# Patient Record
Sex: FEMALE | Race: WHITE | NOT HISPANIC OR LATINO | Employment: OTHER | URBAN - METROPOLITAN AREA
[De-identification: names, ages, dates, MRNs, and addresses within clinical notes are randomized per-mention and may not be internally consistent; named-entity substitution may affect disease eponyms.]

---

## 2019-09-03 ENCOUNTER — HOSPITAL ENCOUNTER (INPATIENT)
Facility: HOSPITAL | Age: 64
LOS: 3 days | Discharge: HOME | End: 2019-09-06
Attending: EMERGENCY MEDICINE | Admitting: INTERNAL MEDICINE

## 2019-09-03 ENCOUNTER — APPOINTMENT (OUTPATIENT)
Dept: GENERAL RADIOLOGY | Facility: HOSPITAL | Age: 64
End: 2019-09-03

## 2019-09-03 DIAGNOSIS — J44.1 COPD EXACERBATION (HCC): Primary | ICD-10-CM

## 2019-09-03 DIAGNOSIS — J96.21 ACUTE ON CHRONIC RESPIRATORY FAILURE WITH HYPOXIA (HCC): ICD-10-CM

## 2019-09-03 PROBLEM — Z72.0 TOBACCO ABUSE: Status: ACTIVE | Noted: 2019-09-03

## 2019-09-03 PROBLEM — D72.829 LEUKOCYTOSIS: Status: ACTIVE | Noted: 2019-09-03

## 2019-09-03 PROBLEM — J96.22 ACUTE ON CHRONIC RESPIRATORY FAILURE WITH HYPOXIA AND HYPERCAPNIA (HCC): Status: ACTIVE | Noted: 2019-09-03

## 2019-09-03 PROBLEM — I10 ESSENTIAL HYPERTENSION: Status: ACTIVE | Noted: 2019-09-03

## 2019-09-03 PROBLEM — R73.9 HYPERGLYCEMIA: Status: ACTIVE | Noted: 2019-09-03

## 2019-09-03 PROBLEM — R19.7 DIARRHEA: Status: ACTIVE | Noted: 2019-09-03

## 2019-09-03 PROBLEM — M19.90 ARTHRITIS: Status: ACTIVE | Noted: 2019-09-03

## 2019-09-03 LAB
ALBUMIN SERPL-MCNC: 3.6 G/DL (ref 3.5–5.2)
ALBUMIN/GLOB SERPL: 1.2 G/DL
ALP SERPL-CCNC: 103 U/L (ref 39–117)
ALT SERPL W P-5'-P-CCNC: 11 U/L (ref 1–33)
ANION GAP SERPL CALCULATED.3IONS-SCNC: 13 MMOL/L (ref 5–15)
ARTERIAL PATENCY WRIST A: ABNORMAL
AST SERPL-CCNC: 11 U/L (ref 1–32)
ATMOSPHERIC PRESS: ABNORMAL MM[HG]
BASE EXCESS BLDA CALC-SCNC: 12.3 MMOL/L (ref 0–2)
BASOPHILS # BLD AUTO: 0.03 10*3/MM3 (ref 0–0.2)
BASOPHILS NFR BLD AUTO: 0.2 % (ref 0–1.5)
BILIRUB SERPL-MCNC: 0.4 MG/DL (ref 0.2–1.2)
BODY TEMPERATURE: 98.6 C
BUN BLD-MCNC: 13 MG/DL (ref 8–23)
BUN/CREAT SERPL: 13 (ref 7–25)
CALCIUM SPEC-SCNC: 8.8 MG/DL (ref 8.6–10.5)
CHLORIDE SERPL-SCNC: 93 MMOL/L (ref 98–107)
CO2 BLDA-SCNC: 38.9 MMOL/L (ref 23–27)
CO2 SERPL-SCNC: 34 MMOL/L (ref 22–29)
COHGB MFR BLD: 5.1 % (ref 0–2)
CREAT BLD-MCNC: 1 MG/DL (ref 0.57–1)
DEPRECATED RDW RBC AUTO: 48.4 FL (ref 37–54)
EOSINOPHIL # BLD AUTO: 0.06 10*3/MM3 (ref 0–0.4)
EOSINOPHIL NFR BLD AUTO: 0.4 % (ref 0.3–6.2)
ERYTHROCYTE [DISTWIDTH] IN BLOOD BY AUTOMATED COUNT: 14.1 % (ref 12.3–15.4)
GFR SERPL CREATININE-BSD FRML MDRD: 56 ML/MIN/1.73
GLOBULIN UR ELPH-MCNC: 3.1 GM/DL
GLUCOSE BLD-MCNC: 134 MG/DL (ref 65–99)
GLUCOSE BLDC GLUCOMTR-MCNC: 160 MG/DL (ref 70–130)
HCO3 BLDA-SCNC: 37.3 MMOL/L (ref 20–26)
HCT VFR BLD AUTO: 36.5 % (ref 34–46.6)
HCT VFR BLD CALC: 33.6 %
HGB BLD-MCNC: 11.7 G/DL (ref 12–15.9)
HGB BLDA-MCNC: 11 G/DL (ref 14–18)
HOLD SPECIMEN: NORMAL
HOLD SPECIMEN: NORMAL
HOROWITZ INDEX BLD+IHG-RTO: 28 %
IMM GRANULOCYTES # BLD AUTO: 0.08 10*3/MM3 (ref 0–0.05)
IMM GRANULOCYTES NFR BLD AUTO: 0.5 % (ref 0–0.5)
LYMPHOCYTES # BLD AUTO: 1.03 10*3/MM3 (ref 0.7–3.1)
LYMPHOCYTES NFR BLD AUTO: 6.9 % (ref 19.6–45.3)
MCH RBC QN AUTO: 30.1 PG (ref 26.6–33)
MCHC RBC AUTO-ENTMCNC: 32.1 G/DL (ref 31.5–35.7)
MCV RBC AUTO: 93.8 FL (ref 79–97)
METHGB BLD QL: 1.1 % (ref 0–1.5)
MODALITY: ABNORMAL
MONOCYTES # BLD AUTO: 0.55 10*3/MM3 (ref 0.1–0.9)
MONOCYTES NFR BLD AUTO: 3.7 % (ref 5–12)
NEUTROPHILS # BLD AUTO: 13.16 10*3/MM3 (ref 1.7–7)
NEUTROPHILS NFR BLD AUTO: 88.3 % (ref 42.7–76)
NOTE: ABNORMAL
NRBC BLD AUTO-RTO: 0 /100 WBC (ref 0–0.2)
NT-PROBNP SERPL-MCNC: 248.2 PG/ML (ref 5–900)
OXYHGB MFR BLDV: 88.5 % (ref 94–99)
PCO2 BLDA: 49.4 MM HG (ref 35–45)
PCO2 TEMP ADJ BLD: 49.4 MM HG (ref 35–45)
PH BLDA: 7.49 PH UNITS (ref 7.35–7.45)
PH, TEMP CORRECTED: 7.49 PH UNITS
PLATELET # BLD AUTO: 279 10*3/MM3 (ref 140–450)
PMV BLD AUTO: 10 FL (ref 6–12)
PO2 BLDA: 68 MM HG (ref 83–108)
PO2 TEMP ADJ BLD: 68 MM HG (ref 83–108)
POTASSIUM BLD-SCNC: 3.5 MMOL/L (ref 3.5–5.2)
PROT SERPL-MCNC: 6.7 G/DL (ref 6–8.5)
RBC # BLD AUTO: 3.89 10*6/MM3 (ref 3.77–5.28)
SODIUM BLD-SCNC: 140 MMOL/L (ref 136–145)
TROPONIN T SERPL-MCNC: <0.01 NG/ML (ref 0–0.03)
VENTILATOR MODE: ABNORMAL
WBC NRBC COR # BLD: 14.91 10*3/MM3 (ref 3.4–10.8)
WHOLE BLOOD HOLD SPECIMEN: NORMAL
WHOLE BLOOD HOLD SPECIMEN: NORMAL

## 2019-09-03 PROCEDURE — 71045 X-RAY EXAM CHEST 1 VIEW: CPT

## 2019-09-03 PROCEDURE — 82805 BLOOD GASES W/O2 SATURATION: CPT

## 2019-09-03 PROCEDURE — 82962 GLUCOSE BLOOD TEST: CPT

## 2019-09-03 PROCEDURE — 25010000002 METHYLPREDNISOLONE PER 40 MG: Performed by: EMERGENCY MEDICINE

## 2019-09-03 PROCEDURE — 94799 UNLISTED PULMONARY SVC/PX: CPT

## 2019-09-03 PROCEDURE — 83880 ASSAY OF NATRIURETIC PEPTIDE: CPT | Performed by: EMERGENCY MEDICINE

## 2019-09-03 PROCEDURE — 85025 COMPLETE CBC W/AUTO DIFF WBC: CPT | Performed by: EMERGENCY MEDICINE

## 2019-09-03 PROCEDURE — 63710000001 INSULIN LISPRO (HUMAN) PER 5 UNITS: Performed by: NURSE PRACTITIONER

## 2019-09-03 PROCEDURE — 93005 ELECTROCARDIOGRAM TRACING: CPT | Performed by: EMERGENCY MEDICINE

## 2019-09-03 PROCEDURE — 99223 1ST HOSP IP/OBS HIGH 75: CPT | Performed by: FAMILY MEDICINE

## 2019-09-03 PROCEDURE — 99285 EMERGENCY DEPT VISIT HI MDM: CPT

## 2019-09-03 PROCEDURE — 80053 COMPREHEN METABOLIC PANEL: CPT | Performed by: EMERGENCY MEDICINE

## 2019-09-03 PROCEDURE — 84484 ASSAY OF TROPONIN QUANT: CPT | Performed by: EMERGENCY MEDICINE

## 2019-09-03 PROCEDURE — 25010000002 HEPARIN (PORCINE) PER 1000 UNITS: Performed by: NURSE PRACTITIONER

## 2019-09-03 PROCEDURE — 36600 WITHDRAWAL OF ARTERIAL BLOOD: CPT

## 2019-09-03 RX ORDER — NICOTINE 21 MG/24HR
1 PATCH, TRANSDERMAL 24 HOURS TRANSDERMAL EVERY 24 HOURS
Status: DISCONTINUED | OUTPATIENT
Start: 2019-09-03 | End: 2019-09-06 | Stop reason: HOSPADM

## 2019-09-03 RX ORDER — HYDROCODONE BITARTRATE AND ACETAMINOPHEN 10; 325 MG/1; MG/1
1 TABLET ORAL EVERY 6 HOURS PRN
Status: DISCONTINUED | OUTPATIENT
Start: 2019-09-03 | End: 2019-09-06 | Stop reason: HOSPADM

## 2019-09-03 RX ORDER — DEXTROSE MONOHYDRATE 25 G/50ML
25 INJECTION, SOLUTION INTRAVENOUS
Status: DISCONTINUED | OUTPATIENT
Start: 2019-09-03 | End: 2019-09-06 | Stop reason: HOSPADM

## 2019-09-03 RX ORDER — NYSTATIN 100000 U/G
CREAM TOPICAL AS NEEDED
COMMUNITY
End: 2022-08-08

## 2019-09-03 RX ORDER — GABAPENTIN 400 MG/1
800 CAPSULE ORAL EVERY 8 HOURS SCHEDULED
Status: DISCONTINUED | OUTPATIENT
Start: 2019-09-03 | End: 2019-09-06 | Stop reason: HOSPADM

## 2019-09-03 RX ORDER — POTASSIUM CHLORIDE 750 MG/1
20 CAPSULE, EXTENDED RELEASE ORAL 2 TIMES DAILY WITH MEALS
Status: DISCONTINUED | OUTPATIENT
Start: 2019-09-03 | End: 2019-09-06 | Stop reason: HOSPADM

## 2019-09-03 RX ORDER — IPRATROPIUM BROMIDE AND ALBUTEROL SULFATE 2.5; .5 MG/3ML; MG/3ML
3 SOLUTION RESPIRATORY (INHALATION) ONCE
Status: COMPLETED | OUTPATIENT
Start: 2019-09-03 | End: 2019-09-03

## 2019-09-03 RX ORDER — SACCHAROMYCES BOULARDII 250 MG
250 CAPSULE ORAL 2 TIMES DAILY
COMMUNITY
End: 2019-09-03

## 2019-09-03 RX ORDER — DOCUSATE SODIUM 250 MG
250 CAPSULE ORAL 2 TIMES DAILY
COMMUNITY

## 2019-09-03 RX ORDER — SODIUM CHLORIDE 0.9 % (FLUSH) 0.9 %
10 SYRINGE (ML) INJECTION AS NEEDED
Status: DISCONTINUED | OUTPATIENT
Start: 2019-09-03 | End: 2019-09-06 | Stop reason: HOSPADM

## 2019-09-03 RX ORDER — HYDROXYZINE HYDROCHLORIDE 25 MG/1
25 TABLET, FILM COATED ORAL 3 TIMES DAILY PRN
Status: DISCONTINUED | OUTPATIENT
Start: 2019-09-03 | End: 2019-09-06 | Stop reason: HOSPADM

## 2019-09-03 RX ORDER — THIAMINE HCL 100 MG
TABLET ORAL DAILY
COMMUNITY

## 2019-09-03 RX ORDER — SACCHAROMYCES BOULARDII 250 MG
250 CAPSULE ORAL 2 TIMES DAILY
Status: DISCONTINUED | OUTPATIENT
Start: 2019-09-03 | End: 2019-09-06 | Stop reason: HOSPADM

## 2019-09-03 RX ORDER — ASCORBIC ACID 500 MG
1000 TABLET ORAL DAILY
COMMUNITY
End: 2022-08-08

## 2019-09-03 RX ORDER — PANTOPRAZOLE SODIUM 40 MG/1
40 TABLET, DELAYED RELEASE ORAL EVERY MORNING
Status: DISCONTINUED | OUTPATIENT
Start: 2019-09-04 | End: 2019-09-05

## 2019-09-03 RX ORDER — BUMETANIDE 1 MG/1
1 TABLET ORAL DAILY
COMMUNITY

## 2019-09-03 RX ORDER — ATORVASTATIN CALCIUM 20 MG/1
20 TABLET, FILM COATED ORAL DAILY
COMMUNITY

## 2019-09-03 RX ORDER — DEXTROMETHORPHAN HYDROBROMIDE AND PROMETHAZINE HYDROCHLORIDE 15; 6.25 MG/5ML; MG/5ML
5 SYRUP ORAL AS NEEDED
COMMUNITY
End: 2022-08-08

## 2019-09-03 RX ORDER — HEPARIN SODIUM 5000 [USP'U]/ML
5000 INJECTION, SOLUTION INTRAVENOUS; SUBCUTANEOUS EVERY 8 HOURS SCHEDULED
Status: DISCONTINUED | OUTPATIENT
Start: 2019-09-03 | End: 2019-09-06 | Stop reason: HOSPADM

## 2019-09-03 RX ORDER — BUMETANIDE 1 MG/1
1 TABLET ORAL DAILY
Status: DISCONTINUED | OUTPATIENT
Start: 2019-09-04 | End: 2019-09-06 | Stop reason: HOSPADM

## 2019-09-03 RX ORDER — ONDANSETRON 2 MG/ML
4 INJECTION INTRAMUSCULAR; INTRAVENOUS EVERY 6 HOURS PRN
Status: DISCONTINUED | OUTPATIENT
Start: 2019-09-03 | End: 2019-09-06 | Stop reason: HOSPADM

## 2019-09-03 RX ORDER — FLUOXETINE HYDROCHLORIDE 20 MG/1
20 CAPSULE ORAL DAILY
Status: DISCONTINUED | OUTPATIENT
Start: 2019-09-04 | End: 2019-09-06 | Stop reason: HOSPADM

## 2019-09-03 RX ORDER — ATORVASTATIN CALCIUM 20 MG/1
20 TABLET, FILM COATED ORAL DAILY
Status: DISCONTINUED | OUTPATIENT
Start: 2019-09-04 | End: 2019-09-06 | Stop reason: HOSPADM

## 2019-09-03 RX ORDER — BUDESONIDE 0.5 MG/2ML
0.5 INHALANT ORAL
Status: DISCONTINUED | OUTPATIENT
Start: 2019-09-03 | End: 2019-09-06 | Stop reason: HOSPADM

## 2019-09-03 RX ORDER — NYSTATIN 100000 [USP'U]/G
POWDER TOPICAL AS NEEDED
COMMUNITY
End: 2022-08-08

## 2019-09-03 RX ORDER — ACETAMINOPHEN 325 MG/1
650 TABLET ORAL EVERY 4 HOURS PRN
Status: DISCONTINUED | OUTPATIENT
Start: 2019-09-03 | End: 2019-09-06 | Stop reason: HOSPADM

## 2019-09-03 RX ORDER — QUETIAPINE FUMARATE 50 MG/1
50 TABLET, FILM COATED ORAL NIGHTLY
COMMUNITY
End: 2022-08-08

## 2019-09-03 RX ORDER — QUETIAPINE FUMARATE 25 MG/1
50 TABLET, FILM COATED ORAL NIGHTLY
Status: DISCONTINUED | OUTPATIENT
Start: 2019-09-03 | End: 2019-09-06 | Stop reason: HOSPADM

## 2019-09-03 RX ORDER — CHOLECALCIFEROL (VITAMIN D3) 125 MCG
2000 CAPSULE ORAL DAILY
COMMUNITY
End: 2022-08-08

## 2019-09-03 RX ORDER — CALCIUM CARBONATE 200(500)MG
2 TABLET,CHEWABLE ORAL 3 TIMES DAILY PRN
Status: DISCONTINUED | OUTPATIENT
Start: 2019-09-03 | End: 2019-09-06 | Stop reason: HOSPADM

## 2019-09-03 RX ORDER — IPRATROPIUM BROMIDE AND ALBUTEROL SULFATE 2.5; .5 MG/3ML; MG/3ML
3 SOLUTION RESPIRATORY (INHALATION)
Status: DISCONTINUED | OUTPATIENT
Start: 2019-09-03 | End: 2019-09-06 | Stop reason: HOSPADM

## 2019-09-03 RX ORDER — CETIRIZINE HYDROCHLORIDE 10 MG/1
10 TABLET ORAL DAILY
Status: DISCONTINUED | OUTPATIENT
Start: 2019-09-04 | End: 2019-09-06 | Stop reason: HOSPADM

## 2019-09-03 RX ORDER — METHYLPREDNISOLONE SODIUM SUCCINATE 40 MG/ML
30 INJECTION, POWDER, LYOPHILIZED, FOR SOLUTION INTRAMUSCULAR; INTRAVENOUS EVERY 12 HOURS
Status: DISCONTINUED | OUTPATIENT
Start: 2019-09-04 | End: 2019-09-04

## 2019-09-03 RX ORDER — NICOTINE 21 MG/24HR
1 PATCH, TRANSDERMAL 24 HOURS TRANSDERMAL EVERY 24 HOURS
COMMUNITY
End: 2019-09-06 | Stop reason: HOSPADM

## 2019-09-03 RX ORDER — ALBUTEROL SULFATE 2.5 MG/3ML
2.5 SOLUTION RESPIRATORY (INHALATION) EVERY 4 HOURS PRN
Status: DISCONTINUED | OUTPATIENT
Start: 2019-09-03 | End: 2019-09-06 | Stop reason: HOSPADM

## 2019-09-03 RX ORDER — CHOLECALCIFEROL (VITAMIN D3) 125 MCG
5 CAPSULE ORAL NIGHTLY PRN
Status: DISCONTINUED | OUTPATIENT
Start: 2019-09-03 | End: 2019-09-06 | Stop reason: HOSPADM

## 2019-09-03 RX ORDER — MISOPROSTOL 200 UG/1
200 TABLET ORAL 2 TIMES DAILY
COMMUNITY

## 2019-09-03 RX ORDER — BUSPIRONE HYDROCHLORIDE 5 MG/1
5 TABLET ORAL EVERY 8 HOURS SCHEDULED
Status: DISCONTINUED | OUTPATIENT
Start: 2019-09-03 | End: 2019-09-06 | Stop reason: HOSPADM

## 2019-09-03 RX ORDER — POTASSIUM CHLORIDE 20 MEQ/1
20 TABLET, EXTENDED RELEASE ORAL 2 TIMES DAILY
COMMUNITY

## 2019-09-03 RX ORDER — ACETAMINOPHEN 650 MG/1
650 SUPPOSITORY RECTAL EVERY 4 HOURS PRN
Status: DISCONTINUED | OUTPATIENT
Start: 2019-09-03 | End: 2019-09-06 | Stop reason: HOSPADM

## 2019-09-03 RX ORDER — BUDESONIDE AND FORMOTEROL FUMARATE DIHYDRATE 160; 4.5 UG/1; UG/1
2 AEROSOL RESPIRATORY (INHALATION)
Status: ON HOLD | COMMUNITY
End: 2020-06-10

## 2019-09-03 RX ORDER — SODIUM CHLORIDE 0.9 % (FLUSH) 0.9 %
10 SYRINGE (ML) INJECTION EVERY 12 HOURS SCHEDULED
Status: DISCONTINUED | OUTPATIENT
Start: 2019-09-03 | End: 2019-09-06 | Stop reason: HOSPADM

## 2019-09-03 RX ORDER — GABAPENTIN 800 MG/1
800 TABLET ORAL 3 TIMES DAILY
COMMUNITY
End: 2022-01-24

## 2019-09-03 RX ORDER — BUSPIRONE HYDROCHLORIDE 5 MG/1
10 TABLET ORAL 2 TIMES DAILY
COMMUNITY
End: 2022-08-08

## 2019-09-03 RX ORDER — ACETAMINOPHEN 160 MG/5ML
650 SOLUTION ORAL EVERY 4 HOURS PRN
Status: DISCONTINUED | OUTPATIENT
Start: 2019-09-03 | End: 2019-09-06 | Stop reason: HOSPADM

## 2019-09-03 RX ORDER — NICOTINE POLACRILEX 4 MG
15 LOZENGE BUCCAL
Status: DISCONTINUED | OUTPATIENT
Start: 2019-09-03 | End: 2019-09-06 | Stop reason: HOSPADM

## 2019-09-03 RX ORDER — METHYLPREDNISOLONE SODIUM SUCCINATE 40 MG/ML
80 INJECTION, POWDER, LYOPHILIZED, FOR SOLUTION INTRAMUSCULAR; INTRAVENOUS ONCE
Status: COMPLETED | OUTPATIENT
Start: 2019-09-03 | End: 2019-09-03

## 2019-09-03 RX ORDER — PHENOL 1.4 %
40 AEROSOL, SPRAY (ML) MUCOUS MEMBRANE NIGHTLY
COMMUNITY
End: 2022-08-08

## 2019-09-03 RX ORDER — BENZONATATE 100 MG/1
200 CAPSULE ORAL 3 TIMES DAILY PRN
Status: DISCONTINUED | OUTPATIENT
Start: 2019-09-03 | End: 2019-09-06 | Stop reason: HOSPADM

## 2019-09-03 RX ORDER — TIZANIDINE 4 MG/1
4 TABLET ORAL 3 TIMES DAILY PRN
Status: DISCONTINUED | OUTPATIENT
Start: 2019-09-03 | End: 2019-09-06 | Stop reason: HOSPADM

## 2019-09-03 RX ORDER — TRAZODONE HYDROCHLORIDE 50 MG/1
50 TABLET ORAL NIGHTLY PRN
Status: DISCONTINUED | OUTPATIENT
Start: 2019-09-03 | End: 2019-09-06 | Stop reason: HOSPADM

## 2019-09-03 RX ORDER — FLUTICASONE PROPIONATE 50 MCG
2 SPRAY, SUSPENSION (ML) NASAL DAILY
Status: DISCONTINUED | OUTPATIENT
Start: 2019-09-04 | End: 2019-09-06 | Stop reason: HOSPADM

## 2019-09-03 RX ORDER — LOSARTAN POTASSIUM 50 MG/1
100 TABLET ORAL DAILY
Status: DISCONTINUED | OUTPATIENT
Start: 2019-09-04 | End: 2019-09-06 | Stop reason: HOSPADM

## 2019-09-03 RX ADMIN — BUSPIRONE HYDROCHLORIDE 5 MG: 5 TABLET ORAL at 22:47

## 2019-09-03 RX ADMIN — HYDROCODONE BITARTRATE AND ACETAMINOPHEN 1 TABLET: 10; 325 TABLET ORAL at 23:04

## 2019-09-03 RX ADMIN — HEPARIN SODIUM 5000 UNITS: 5000 INJECTION INTRAVENOUS; SUBCUTANEOUS at 22:47

## 2019-09-03 RX ADMIN — INSULIN LISPRO 2 UNITS: 100 INJECTION, SOLUTION INTRAVENOUS; SUBCUTANEOUS at 20:20

## 2019-09-03 RX ADMIN — QUETIAPINE FUMARATE 50 MG: 25 TABLET ORAL at 20:16

## 2019-09-03 RX ADMIN — BUDESONIDE 0.5 MG: 0.5 INHALANT RESPIRATORY (INHALATION) at 19:25

## 2019-09-03 RX ADMIN — GABAPENTIN 800 MG: 400 CAPSULE ORAL at 22:47

## 2019-09-03 RX ADMIN — IPRATROPIUM BROMIDE AND ALBUTEROL SULFATE 3 ML: 2.5; .5 SOLUTION RESPIRATORY (INHALATION) at 16:23

## 2019-09-03 RX ADMIN — METHYLPREDNISOLONE SODIUM SUCCINATE 80 MG: 40 INJECTION, POWDER, FOR SOLUTION INTRAMUSCULAR; INTRAVENOUS at 15:23

## 2019-09-03 RX ADMIN — POTASSIUM CHLORIDE 20 MEQ: 750 CAPSULE, EXTENDED RELEASE ORAL at 18:52

## 2019-09-03 RX ADMIN — IPRATROPIUM BROMIDE AND ALBUTEROL SULFATE 3 ML: 2.5; .5 SOLUTION RESPIRATORY (INHALATION) at 19:25

## 2019-09-03 RX ADMIN — Medication 250 MG: at 20:16

## 2019-09-03 RX ADMIN — NICOTINE 1 PATCH: 21 PATCH, EXTENDED RELEASE TRANSDERMAL at 18:52

## 2019-09-03 NOTE — H&P
[facility] Medicine Services  HISTORY AND PHYSICAL    Patient Name: Quyen Galvan  : 1955  MRN: [id number]  Primary Care Physician: Akshat Dawson MD  Date of admission: 9/3/2019      Subjective   Subjective     Chief Complaint:  SOA    HPI:  Quyen Galvan is a 63 y.o. female with past medical history significant for hypertension, hyperlipidemia, chronic pain/fibromyalgia/arthritis, COPD and ongoing tobacco use presents for shortness of air and productive cough.  Patient states she has been feeling unwell for approximately 1 month and was admitted to [facility] around 2019 for 2 days.  She was discharged with prednisone and Z-Bernardo.  She was offered oxygen however refused and has not stopped smoking while at home post discharge.  She states she completed her last dose of prednisone earlier today and after completing Z-Bernardo she presented to primary care provider's office for ongoing cough, chest congestion, shortness of breath who started her on doxycycline.    Patient presents today as symptoms have not improved despite inpatient/outpatient treatment.  She is run out of medication for nebulizer which helps some as well as cough medicine which helps minimally however symptoms have not improved in the past month.  Patient states she has productive cough, chest congestion, wheezing, dyspnea on exertion.  States she has posttussive dizziness/lightheadedness.  Denies nausea or vomiting, but endorses decreased appetite and small amount of weight loss over the last month.  She reports diarrhea that has been ongoing x3 days.  Reports diarrhea as watery and continues despite not eating.  He denies fever, chills, upper airway symptoms.  Patient to be admitted for COPD exacerbation and ongoing treatment.    Review of Systems   Constitutional: Positive for activity change, appetite change and fatigue. Negative for fever.   HENT: Negative.    Eyes: Negative.     Respiratory: Positive for cough, chest tightness, shortness of breath and wheezing.    Cardiovascular: Negative.    Gastrointestinal: Positive for diarrhea.   Endocrine: Negative.    Genitourinary: Negative.    Musculoskeletal: Positive for arthralgias.   Skin: Negative.    Neurological: Positive for weakness and light-headedness.   Psychiatric/Behavioral: Negative.           All other systems reviewed and are negative.     Personal History     Past Medical History:   Diagnosis Date   • Anemia    • Arthritis    • COPD (chronic obstructive pulmonary disease) (CMS/HCC)    • Fibromyalgia    • Hyperlipidemia    • Hypertension        Past Surgical History:   Procedure Laterality Date   • CHOLECYSTECTOMY     • FRACTURE SURGERY      left wrist   • OVARIAN CYST REMOVAL     • SPINE SURGERY         Family History: family history includes Hypertension in her father. Otherwise pertinent FHx was reviewed and unremarkable.     Social History:  reports that she has been smoking cigarettes.  She has been smoking about 0.50 packs per day. She has never used smokeless tobacco. She reports that she does not drink alcohol or use drugs.  Social History     Social History Narrative    Lives with  in [place].  Independent with all ADLs       Medications:    Available home medication information reviewed.  Medications Prior to Admission   Medication Sig Dispense Refill Last Dose   • albuterol (PROVENTIL HFA;VENTOLIN HFA) 108 (90 BASE) MCG/ACT inhaler Inhale 2 puffs every 4 (four) hours as needed for wheezing.   9/2/2019 at Unknown time   • atorvastatin (LIPITOR) 20 MG tablet Take 20 mg by mouth Daily.   9/3/2019 at Unknown time   • budesonide-formoterol (SYMBICORT) 160-4.5 MCG/ACT inhaler Inhale 2 puffs 2 (Two) Times a Day.   9/3/2019 at Unknown time   • bumetanide (BUMEX) 1 MG tablet Take 1 mg by mouth 2 (Two) Times a Day.   9/3/2019 at Unknown time   • busPIRone (BUSPAR) 5 MG tablet Take 10 mg by mouth 2 (Two) Times a Day.    9/3/2019 at Unknown time   • Calcium Carbonate-Vit D-Min (CALCIUM 1200 PO) Take 1,200 mg by mouth 2 (Two) Times a Day.   9/3/2019 at Unknown time   • Cholecalciferol (VITAMIN D3) 2000 units tablet Take 2,000 Units by mouth Daily.   9/3/2019 at Unknown time   • diclofenac (VOLTAREN) 1 % gel gel Apply 4 g topically to the appropriate area as directed 4 (Four) Times a Day As Needed (feet and knees).   Past Week at Unknown time   • diclofenac (VOLTAREN) 50 MG EC tablet Take 50 mg by mouth 2 (Two) Times a Day.   9/3/2019 at Unknown time   • docusate sodium (COLACE) 250 MG capsule Take 250 mg by mouth 2 (Two) Times a Day.   9/3/2019 at Unknown time   • fexofenadine (ALLEGRA) 180 MG tablet Take 180 mg by mouth daily.   9/3/2019 at Unknown time   • FLUoxetine (PROzac) 20 MG capsule Take 20 mg by mouth daily.   9/3/2019 at Unknown time   • fluticasone (FLONASE) 50 MCG/ACT nasal spray 2 sprays into the nostril(s) as directed by provider Daily. Administer 2 sprays in each nostril for each dose.    9/3/2019 at Unknown time   • gabapentin (NEURONTIN) 800 MG tablet Take 800 mg by mouth 3 (Three) Times a Day.   9/3/2019 at 0930   • HYDROcodone-acetaminophen (NORCO)  MG per tablet Take 1 tablet by mouth Every 6 (Six) Hours.   9/3/2019 at 0930   • hydrOXYzine (ATARAX) 25 MG tablet Take 25 mg by mouth 4 (Four) Times a Day.   9/3/2019 at Unknown time   • losartan (COZAAR) 100 MG tablet Take 100 mg by mouth Daily.   9/3/2019 at Unknown time   • Melatonin 10 MG tablet Take 40 mg by mouth Every Night. 4 tabs (40mg total) nightly   9/2/2019 at Unknown time   • misoprostol (CYTOTEC) 200 MCG tablet Take 200 mcg by mouth 2 (Two) Times a Day.   9/3/2019 at Unknown time   • nicotine (NICODERM CQ) 21 MG/24HR patch Place 1 patch on the skin as directed by provider Daily.   Past Month at Unknown time   • nitroglycerin (NITROSTAT) 0.4 MG SL tablet Place 0.4 mg under the tongue every 5 (five) minutes as needed for chest pain. Take no more  than 3 doses in 15 minutes.      • nystatin (MYCOSTATIN) 212072 UNIT/GM cream Apply  topically to the appropriate area as directed As Needed.   9/2/2019 at Unknown time   • nystatin (MYCOSTATIN) 210226 UNIT/GM powder Apply  topically to the appropriate area as directed As Needed.      • omeprazole (priLOSEC) 40 MG capsule Take 40 mg by mouth 2 (Two) Times a Day.   9/3/2019 at Unknown time   • potassium chloride (K-DUR,KLOR-CON) 20 MEQ CR tablet Take 20 mEq by mouth Daily.   9/3/2019 at Unknown time   • Probiotic capsule Take 1 capsule by mouth Daily.   9/3/2019 at Unknown time   • promethazine-dextromethorphan (PROMETHAZINE-DM) 6.25-15 MG/5ML syrup Take 5 mL by mouth As Needed.   9/2/2019 at Unknown time   • QUEtiapine (SEROquel) 50 MG tablet Take 50 mg by mouth Every Night.   9/2/2019 at Unknown time   • tiZANidine (ZANAFLEX) 4 MG tablet Take 4 mg by mouth 3 (three) times a day.   9/3/2019 at Unknown time   • triamcinolone (KENALOG) 0.1 % ointment Apply  topically to the appropriate area as directed As Needed.      • vitamin B-12 (CYANOCOBALAMIN) 2500 MCG sublingual tablet tablet Place  under the tongue Daily.   9/3/2019 at Unknown time   • vitamin C (ASCORBIC ACID) 500 MG tablet Take 1,000 mg by mouth Daily.   Past Week at Unknown time   • VITAMIN E PO Take 1 capsule by mouth Daily.   9/3/2019 at Unknown time       No Known Allergies    Objective   Objective     Vital Signs:   Temp:  [98.4 °F (36.9 °C)] 98.4 °F (36.9 °C)  Heart Rate:  [71-90] 75  Resp:  [20-28] 20  BP: (119-139)/(58-70) 139/66        Physical Exam   Constitutional: Awake, alert  Eyes: PERRLA, sclerae anicteric, no conjunctival injection  HENT: NCAT, mucous membranes moist  Neck: Supple, no thyromegaly, no lymphadenopathy, trachea midline  Respiratory: Few scattered expiratory wheezes, diminished airflow throughout, nonlabored respirations 2 L nasal cannula  Cardiovascular: RRR, no murmurs, rubs, or gallops, palpable pedal pulses  bilaterally  Gastrointestinal: Positive bowel sounds, soft, nontender, nondistended  Musculoskeletal: No bilateral ankle edema, no clubbing or cyanosis to extremities  Psychiatric: Appropriate affect, cooperative  Neurologic: Oriented x 3, strength symmetric in all extremities, Cranial Nerves grossly intact to confrontation, speech clear  Skin: No rashes      Results Reviewed:  I have personally reviewed current lab and radiology data.    Results from last 7 days   Lab Units 09/03/19  1336   WBC 10*3/mm3 14.91*   HEMOGLOBIN g/dL 11.7*   HEMATOCRIT % 36.5   PLATELETS 10*3/mm3 279     Results from last 7 days   Lab Units 09/03/19  1336   SODIUM mmol/L 140   POTASSIUM mmol/L 3.5   CHLORIDE mmol/L 93*   CO2 mmol/L 34.0*   BUN mg/dL 13   CREATININE mg/dL 1.00   GLUCOSE mg/dL 134*   CALCIUM mg/dL 8.8   ALT (SGPT) U/L 11   AST (SGOT) U/L 11   TROPONIN T ng/mL <0.010   PROBNP pg/mL 248.2     Estimated Creatinine Clearance: 62.6 mL/min (by C-G formula based on SCr of 1 mg/dL).  Brief Urine Lab Results     None        Imaging Results (last 24 hours)     Procedure Component Value Units Date/Time    XR Chest 1 View [[id number]] Collected:  09/03/19 1406     Updated:  09/03/19 1445    Narrative:       EXAMINATION: XR CHEST 1 VW-09/03/2019:      INDICATION: SOA, triage protocol.      COMPARISON: Chest radiograph 06/14/2016.     FINDINGS: Single portable chest radiograph submitted for review. The  heart is nonenlarged. The lungs are clear without evidence for  superimposed pneumonia. No pleural effusion or pneumothorax. The  visualized upper abdomen is unrevealing. No acute osseous injury  identified. Calcified granulomas noted within the medial left lower  lung, unchanged from the 2016 exam.           Impression:       No acute cardiopulmonary process.     D:  09/03/2019  E:  09/03/2019                      Assessment/Plan   Assessment / Plan     Active Hospital Problems    Diagnosis POA   • Acute on chronic respiratory failure  with hypoxia and hypercapnia (CMS/HCC) [J96.21, J96.22] Yes   • COPD with acute exacerbation (CMS/HCC) [J44.1] Unknown   • Tobacco abuse [Z72.0] Unknown   • Diarrhea [R19.7] Unknown   • Essential hypertension [I10] Unknown   • Hyperglycemia [R73.9] Unknown   • Arthritis [M19.90] Unknown   • Leukocytosis [D72.829] Unknown     Acute hypoxic respiratory failure and AECOPD despite inpatient and outpatient therapy in a patient who continues to smoke.    --Respiratory support: Oxygen nasal cannula, wean as tolerates.  Continue scheduled and as needed nebulizer treatments, Pulmicort nebs scheduled, Tessalon Perles, twice daily Solu-Medrol.  Patient currently is afebrile and suspect leukocytosis due to ongoing steroids.  Patient has been on 2 weeks of antibiotics so will hold further antibiotics for now.  --Smoking cessation advised/counseled.  Nicotine patch  --Diarrhea x3 days, multiple episodes associated with antibiotic use.  Currently on probiotic and will continue.  Will check for C. difficile colitis.  We will continue PPI for now due to severe GERD, per patient report  --Continue home antihypertensives  --Noted hyperglycemia without history of diabetes likely steroid-induced.  Will check A1c and fingersticks for now.  Will discontinue if blood sugar remains stable  --A.m. Labs  --PT consult for weakness/dyspnea on exertion    DVT prophylaxis:  Heparin sq    CODE STATUS:    Code Status and Medical Interventions:   Ordered at: 09/03/19 2823     Level Of Support Discussed With:    Patient     Code Status:    CPR     Medical Interventions (Level of Support Prior to Arrest):    Full       Admission Status:  I believe this patient meets INPATIENT status due to acute hypoxic respiratory failure and COPD exacerbation.  I feel patient’s risk for adverse outcomes and need for care warrant INPATIENT evaluation and predict the patient’s care encounter to likely last beyond 2 midnights.    Electronically signed by Carlyn FENTON  "Darryn, ARI, 09/03/19, 6:31 PM.        Brief Attending Admission Attestation     I have seen and examined the patient, performing an independent face-to-face diagnostic evaluation with plan of care reviewed and developed with the advanced practice clinician (APC).      Brief Summary Statement:   Quyen Galvan is a 63 y.o. female with PMH significant for HTN, HLD, chronic pain, COPD and ongoing smoking.  The patient has had a month of SOA, cough, and wheezing.  She was admitted to [facility] [place] 8/23 - 8/25 for similar symptoms.  She was discharged on ZPack and steroids PO.  She completed her ZPack on time and completed her steroid course today.  After her ZPack her PCP treated her with doxycycline.  She has not improved despite taking her medications and using her breathing treatments at home.  She has continued to smoke.  She refused home O2 upon discharge from [facility].  She has not measured her temp but felt \"hot\" this morning.  She has had nausea but no vomiting.      Here in the ER, RA sat was 85%.  WBC 14.91.  ABG 7.487/49.4/68.0.  CXR non acute.    Remainder of detailed HPI is as noted above and has been reviewed and/or edited by me for completeness.      Attending Physical Exam:  Constitutional: Awake, alert  Eyes: PERRLA, sclerae anicteric, no conjunctival injection  HENT: NCAT, mucous membranes moist  Neck: Supple, no thyromegaly, no lymphadenopathy, trachea midline  Respiratory:wheezing and rhonchi throughout, mild dyspnea  Cardiovascular: RRR, no murmurs, rubs, or gallops, palpable pedal pulses bilaterally  Gastrointestinal: Positive bowel sounds, soft, nontender, nondistended  Musculoskeletal: No bilateral ankle edema, no clubbing or cyanosis to extremities  Psychiatric: Appropriate affect, cooperative  Neurologic: Oriented x 3, strength symmetric in all extremities, Cranial Nerves grossly intact to confrontation, speech clear  Skin: No rashes        Brief Assessment/Plan :  See above for further detailed " assessment and plan developed with APC which I have reviewed and/or edited for completeness.      Electronically signed by Agustina Campbell MD, 09/03/19, 7:17 PM.

## 2019-09-03 NOTE — PLAN OF CARE
Problem: Patient Care Overview  Goal: Plan of Care Review  Outcome: Ongoing (interventions implemented as appropriate)   09/03/19 1841   Coping/Psychosocial   Plan of Care Reviewed With patient   Plan of Care Review   Progress no change   OTHER   Outcome Summary Pt. admitted from ED, VSS, fall precautions in place, spore precautions in place to rule out c-diff, we will continue to monitor       Problem: Chronic Obstructive Pulmonary Disease (Adult)  Goal: Signs and Symptoms of Listed Potential Problems Will be Absent, Minimized or Managed (Chronic Obstructive Pulmonary Disease)  Outcome: Ongoing (interventions implemented as appropriate)   09/03/19 1841   Goal/Outcome Evaluation   Problems Assessed (Chronic Obstructive Pulmonary Disease (COPD)) all   Problems Present (COPD, Bronch/Emphy) respiratory compromise;situational response

## 2019-09-03 NOTE — ED PROVIDER NOTES
Subjective   Quyen Galvan is a 63 y.o female who presents to the ED with complaints of shortness of breath. She reports she began experiencing shortness of breath with an onset one month ago. She was discharged from [facility] on 08/23/2019 with similar symptoms and sent home on Z-PACK and Prednisone. After her symptoms had not alleviated, she followed-up with Dr. Dawson, family medicine, and was prescribed Doxycycline. She also complains of congestion, abdominal pain, diarrhea, and cough. She denies nausea, vomiting, and fever. She has a past medical history of COPD and hypertension. She has a nebulizer and inhaler at home but states they have both been ineffective. She currently smokes half a pack of cigarettes a day. There are no other acute symptoms at this time.        History provided by:  Patient  Shortness of Breath   Severity:  Severe  Onset quality:  Sudden  Duration:  1 month  Timing:  Constant  Progression:  Unchanged  Chronicity:  New  Associated symptoms: abdominal pain and cough    Associated symptoms: no fever and no vomiting    Abdominal pain:     Location:  Generalized    Quality: cramping      Severity:  Moderate    Onset quality:  Sudden    Timing:  Constant    Progression:  Unchanged    Chronicity:  New  Cough:     Cough characteristics:  Productive    Sputum characteristics:  Yellow    Severity:  Moderate    Onset quality:  Sudden    Timing:  Constant    Progression:  Unchanged    Chronicity:  New  Risk factors: tobacco use        Review of Systems   Constitutional: Negative for fever.   HENT: Positive for congestion.    Respiratory: Positive for cough and shortness of breath.    Gastrointestinal: Positive for abdominal pain and diarrhea. Negative for nausea and vomiting.   All other systems reviewed and are negative.      Past Medical History:   Diagnosis Date   • Anemia    • Arthritis    • COPD (chronic obstructive pulmonary disease) (CMS/[facility])    • Fibromyalgia    • Hyperlipidemia     • Hypertension        No Known Allergies    Past Surgical History:   Procedure Laterality Date   • CHOLECYSTECTOMY     • FRACTURE SURGERY      left wrist   • OVARIAN CYST REMOVAL     • SPINE SURGERY         Family History   Problem Relation Age of Onset   • Hypertension Father        Social History     Socioeconomic History   • Marital status:      Spouse name: Not on file   • Number of children: Not on file   • Years of education: Not on file   • Highest education level: Not on file   Tobacco Use   • Smoking status: Current Every Day Smoker     Packs/day: 0.50     Types: Cigarettes   • Smokeless tobacco: Never Used   Substance and Sexual Activity   • Alcohol use: No     Frequency: Never   • Drug use: No   • Sexual activity: Defer   Social History Narrative    Lives with  in [place].  Independent with all ADLs         Objective   Physical Exam   Constitutional: She is oriented to person, place, and time. She appears well-developed and well-nourished. No distress.   HENT:   Head: Normocephalic and atraumatic.   Nose: Nose normal.   Pharynx benign. Airway patent.   Eyes: Conjunctivae are normal. No scleral icterus.   Neck: Normal range of motion. Neck supple. No JVD present.   Cardiovascular: Normal rate, regular rhythm and normal heart sounds.   No murmur heard.  Pulmonary/Chest: Effort normal. No respiratory distress. She has wheezes. She has rhonchi. She has rales.   Diffuse wheezes and rhonchi. Coarse rales more on the left side than the right.   Abdominal: Soft. Bowel sounds are normal. There is tenderness (mild) in the epigastric area.   Musculoskeletal: Normal range of motion. She exhibits edema.   Trace pretibial edema.   Lymphadenopathy:     She has no cervical adenopathy.   Neurological: She is alert and oriented to person, place, and time.   Skin: Skin is warm and dry.   Psychiatric: Her behavior is normal. She exhibits a depressed mood.   Nursing note and vitals  reviewed.      Procedures         ED Course  ED Course as of Sep 03 2101   Tue Sep 03, 2019   1410 Just completed prednisone course.  [LI]   1703 Dr. Gonsalez is at bedside re-examining the patient, discussing all results, and updating them on the plan. She is minimally improved with the neb treatment and still has diffuse wheezes.  [PK]   1708 Dr. Gonsalez discussed the case with Dr. Campbell, hospitalist, who will admit.  [PK]      ED Course User Index  [LI] Flakito Gonsalez MD  [PK] Gene Benson         Final Diagnosis: as of Sep 03 2101   COPD exacerbation (CMS/HCC)   Acute on chronic respiratory failure with hypoxia (CMS/HCC)     Recent Results (from the past 24 hour(s))   Comprehensive Metabolic Panel    Collection Time: 09/03/19  1:36 PM   Result Value Ref Range    Glucose 134 (H) 65 - 99 mg/dL    BUN 13 8 - 23 mg/dL    Creatinine 1.00 0.57 - 1.00 mg/dL    Sodium 140 136 - 145 mmol/L    Potassium 3.5 3.5 - 5.2 mmol/L    Chloride 93 (L) 98 - 107 mmol/L    CO2 34.0 (H) 22.0 - 29.0 mmol/L    Calcium 8.8 8.6 - 10.5 mg/dL    Total Protein 6.7 6.0 - 8.5 g/dL    Albumin 3.60 3.50 - 5.20 g/dL    ALT (SGPT) 11 1 - 33 U/L    AST (SGOT) 11 1 - 32 U/L    Alkaline Phosphatase 103 39 - 117 U/L    Total Bilirubin 0.4 0.2 - 1.2 mg/dL    eGFR Non African Amer 56 (L) >60 mL/min/1.73    Globulin 3.1 gm/dL    A/G Ratio 1.2 g/dL    BUN/Creatinine Ratio 13.0 7.0 - 25.0    Anion Gap 13.0 5.0 - 15.0 mmol/L   BNP    Collection Time: 09/03/19  1:36 PM   Result Value Ref Range    proBNP 248.2 5.0 - 900.0 pg/mL   Troponin    Collection Time: 09/03/19  1:36 PM   Result Value Ref Range    Troponin T <0.010 0.000 - 0.030 ng/mL   Light Blue Top    Collection Time: 09/03/19  1:36 PM   Result Value Ref Range    Extra Tube hold for add-on    Green Top (Gel)    Collection Time: 09/03/19  1:36 PM   Result Value Ref Range    Extra Tube Hold for add-ons.    Lavender Top    Collection Time: 09/03/19  1:36 PM   Result Value Ref Range    Extra Tube hold  for add-on    Gold Top - SST    Collection Time: 09/03/19  1:36 PM   Result Value Ref Range    Extra Tube Hold for add-ons.    CBC Auto Differential    Collection Time: 09/03/19  1:36 PM   Result Value Ref Range    WBC 14.91 (H) 3.40 - 10.80 10*3/mm3    RBC 3.89 3.77 - 5.28 10*6/mm3    Hemoglobin 11.7 (L) 12.0 - 15.9 g/dL    Hematocrit 36.5 34.0 - 46.6 %    MCV 93.8 79.0 - 97.0 fL    MCH 30.1 26.6 - 33.0 pg    MCHC 32.1 31.5 - 35.7 g/dL    RDW 14.1 12.3 - 15.4 %    RDW-SD 48.4 37.0 - 54.0 fl    MPV 10.0 6.0 - 12.0 fL    Platelets 279 140 - 450 10*3/mm3    Neutrophil % 88.3 (H) 42.7 - 76.0 %    Lymphocyte % 6.9 (L) 19.6 - 45.3 %    Monocyte % 3.7 (L) 5.0 - 12.0 %    Eosinophil % 0.4 0.3 - 6.2 %    Basophil % 0.2 0.0 - 1.5 %    Immature Grans % 0.5 0.0 - 0.5 %    Neutrophils, Absolute 13.16 (H) 1.70 - 7.00 10*3/mm3    Lymphocytes, Absolute 1.03 0.70 - 3.10 10*3/mm3    Monocytes, Absolute 0.55 0.10 - 0.90 10*3/mm3    Eosinophils, Absolute 0.06 0.00 - 0.40 10*3/mm3    Basophils, Absolute 0.03 0.00 - 0.20 10*3/mm3    Immature Grans, Absolute 0.08 (H) 0.00 - 0.05 10*3/mm3    nRBC 0.0 0.0 - 0.2 /100 WBC   Blood Gas, Arterial With Co-Ox    Collection Time: 09/03/19  4:32 PM   Result Value Ref Range    Ascencion's Test N/A     pH, Arterial 7.487 (H) 7.350 - 7.450 pH units    pCO2, Arterial 49.4 (H) 35.0 - 45.0 mm Hg    pO2, Arterial 68.0 (L) 83.0 - 108.0 mm Hg    HCO3, Arterial 37.3 (H) 20.0 - 26.0 mmol/L    Base Excess, Arterial 12.3 (H) 0.0 - 2.0 mmol/L    Hemoglobin, Blood Gas 11.0 (L) 14 - 18 g/dL    Hematocrit, Blood Gas 33.6 %    Oxyhemoglobin 88.5 (L) 94 - 99 %    Methemoglobin 1.10 0.00 - 1.50 %    Carboxyhemoglobin 5.1 (H) 0 - 2 %    CO2 Content 38.9 (H) 23 - 27 mmol/L    Temperature 98.6 C    Barometric Pressure for Blood Gas      Modality Nasal Cannula     FIO2 28 %    Ventilator Mode       Note      pH, Temp Corrected 7.487 pH Units    pCO2, Temperature Corrected 49.4 (H) 35 - 45 mm Hg    pO2, Temperature Corrected  68.0 (L) 83 - 108 mm Hg   POC Glucose Once    Collection Time: 09/03/19  8:19 PM   Result Value Ref Range    Glucose 160 (H) 70 - 130 mg/dL     Note: In addition to lab results from this visit, the labs listed above may include labs taken at another facility or during a different encounter within the last 24 hours. Please correlate lab times with ED admission and discharge times for further clarification of the services performed during this visit.    XR Chest 1 View   Preliminary Result   No acute cardiopulmonary process.       D:  09/03/2019   E:  09/03/2019                    Vitals:    09/03/19 1623 09/03/19 1745 09/03/19 1808 09/03/19 1925   BP:  139/66 146/72    BP Location:   Left arm    Patient Position:       Pulse: 71 75 81 71   Resp:   20 20   Temp:   97.8 °F (36.6 °C)    TempSrc:   Oral    SpO2: 95% 92% 90% 93%   Weight:       Height:         Medications   sodium chloride 0.9 % flush 10 mL (not administered)   atorvastatin (LIPITOR) tablet 20 mg (not administered)   bumetanide (BUMEX) tablet 1 mg (not administered)   busPIRone (BUSPAR) tablet 5 mg (not administered)   diclofenac (VOLTAREN) 1 % gel 4 g (4 g Topical Given 9/3/19 2017)   cetirizine (zyrTEC) tablet 10 mg (not administered)   FLUoxetine (PROzac) capsule 20 mg (not administered)   fluticasone (FLONASE) 50 MCG/ACT nasal spray 2 spray (not administered)   gabapentin (NEURONTIN) capsule 800 mg (not administered)   HYDROcodone-acetaminophen (NORCO)  MG per tablet 1 tablet (not administered)   hydrOXYzine (ATARAX) tablet 25 mg (not administered)   losartan (COZAAR) tablet 100 mg (not administered)   melatonin tablet 5 mg (not administered)   nicotine (NICODERM CQ) 21 MG/24HR patch 1 patch (1 patch Transdermal Medication Applied 9/3/19 1852)   pantoprazole (PROTONIX) EC tablet 40 mg (not administered)   potassium chloride (MICRO-K) CR capsule 20 mEq (20 mEq Oral Given 9/3/19 1852)   QUEtiapine (SEROquel) tablet 50 mg (50 mg Oral Given 9/3/19  2016)   saccharomyces boulardii (FLORASTOR) capsule 250 mg (250 mg Oral Given 9/3/19 2016)   tiZANidine (ZANAFLEX) tablet 4 mg (not administered)   traZODone (DESYREL) tablet 50 mg (not administered)   dextrose (GLUTOSE) oral gel 15 g (not administered)   dextrose (D50W) 25 g/ 50mL Intravenous Solution 25 g (not administered)   glucagon (human recombinant) (GLUCAGEN DIAGNOSTIC) injection 1 mg (not administered)   sodium chloride 0.9 % flush 10 mL ( Intravenous Canceled Entry 9/3/19 2017)   sodium chloride 0.9 % flush 10 mL (not administered)   heparin (porcine) 5000 UNIT/ML injection 5,000 Units (not administered)   acetaminophen (TYLENOL) tablet 650 mg (not administered)     Or   acetaminophen (TYLENOL) 160 MG/5ML solution 650 mg (not administered)     Or   acetaminophen (TYLENOL) suppository 650 mg (not administered)   insulin lispro (humaLOG) injection 0-7 Units (2 Units Subcutaneous Given 9/3/19 2020)   budesonide (PULMICORT) nebulizer solution 0.5 mg (0.5 mg Nebulization Given 9/3/19 1925)   ipratropium-albuterol (DUO-NEB) nebulizer solution 3 mL (3 mL Nebulization Given 9/3/19 1925)   albuterol (PROVENTIL) nebulizer solution 0.083% 2.5 mg/3mL (not administered)   methylPREDNISolone sodium succinate (SOLU-Medrol) injection 30 mg (not administered)   ondansetron (ZOFRAN) injection 4 mg (not administered)   calcium carbonate (TUMS) chewable tablet 500 mg (200 mg elemental) (not administered)   benzonatate (TESSALON) capsule 200 mg (not administered)   ipratropium-albuterol (DUO-NEB) nebulizer solution 3 mL (3 mL Nebulization Given 9/3/19 1623)   methylPREDNISolone sodium succinate (SOLU-Medrol) injection 80 mg (80 mg Intravenous Given 9/3/19 1523)     ECG/EMG Results (last 24 hours)     Procedure Component Value Units Date/Time    ECG 12 Lead [[id number]] Collected:  09/03/19 1332     Updated:  09/03/19 1342    Narrative:       Test Reason : SOA Protocol  Blood Pressure : **/** mmHG  Vent. Rate : 079 BPM     Atrial  Rate : 079 BPM     P-R Int : 108 ms          QRS Dur : 072 ms      QT Int : 380 ms       P-R-T Axes : 028 -18 024 degrees     QTc Int : 435 ms    Sinus rhythm with short GA  Low voltage QRS  Inferior infarct (cited on or before 14-JUN-2016)  Abnormal ECG  When compared with ECG of 14-JUN-2016 18:36,  GA interval has decreased  Confirmed by FLAKITO GONSALEZ MD ([id number]) on 9/3/2019 1:41:55 PM    Referred By:  EDMD           Confirmed By:FLAKITO GONSALEZ MD        ECG 12 Lead   Final Result   Test Reason : SOA Protocol   Blood Pressure : **/** mmHG   Vent. Rate : 079 BPM     Atrial Rate : 079 BPM      P-R Int : 108 ms          QRS Dur : 072 ms       QT Int : 380 ms       P-R-T Axes : 028 -18 024 degrees      QTc Int : 435 ms      Sinus rhythm with short GA   Low voltage QRS   Inferior infarct (cited on or before 14-JUN-2016)   Abnormal ECG   When compared with ECG of 14-JUN-2016 18:36,   GA interval has decreased   Confirmed by FLAKITO GONSALEZ MD ([id number]) on 9/3/2019 1:41:55 PM      Referred By:  EDMD           Confirmed By:FLAKITO GONSALEZ MD                        [facility]    Final diagnoses:   COPD exacerbation (CMS/HCC)   Acute on chronic respiratory failure with hypoxia (CMS/HCC)       Documentation assistance provided by screun Benson.  Information recorded by the scribe was done at my direction and has been verified and validated by me.     Gene Benson  09/03/19 2734       Gene Benson  09/03/19 0537       Flakito oGnsalez MD  09/03/19 0819

## 2019-09-04 LAB
ANION GAP SERPL CALCULATED.3IONS-SCNC: 12 MMOL/L (ref 5–15)
BUN BLD-MCNC: 18 MG/DL (ref 8–23)
BUN/CREAT SERPL: 18.4 (ref 7–25)
CALCIUM SPEC-SCNC: 8.9 MG/DL (ref 8.6–10.5)
CHLORIDE SERPL-SCNC: 96 MMOL/L (ref 98–107)
CO2 SERPL-SCNC: 35 MMOL/L (ref 22–29)
CREAT BLD-MCNC: 0.98 MG/DL (ref 0.57–1)
DEPRECATED RDW RBC AUTO: 47.5 FL (ref 37–54)
ERYTHROCYTE [DISTWIDTH] IN BLOOD BY AUTOMATED COUNT: 13.7 % (ref 12.3–15.4)
GFR SERPL CREATININE-BSD FRML MDRD: 57 ML/MIN/1.73
GLUCOSE BLD-MCNC: 107 MG/DL (ref 65–99)
GLUCOSE BLDC GLUCOMTR-MCNC: 120 MG/DL (ref 70–130)
GLUCOSE BLDC GLUCOMTR-MCNC: 131 MG/DL (ref 70–130)
GLUCOSE BLDC GLUCOMTR-MCNC: 153 MG/DL (ref 70–130)
GLUCOSE BLDC GLUCOMTR-MCNC: 157 MG/DL (ref 70–130)
HBA1C MFR BLD: 6.3 % (ref 4.8–5.6)
HCT VFR BLD AUTO: 35 % (ref 34–46.6)
HGB BLD-MCNC: 11.1 G/DL (ref 12–15.9)
MCH RBC QN AUTO: 29.9 PG (ref 26.6–33)
MCHC RBC AUTO-ENTMCNC: 31.7 G/DL (ref 31.5–35.7)
MCV RBC AUTO: 94.3 FL (ref 79–97)
PLATELET # BLD AUTO: 279 10*3/MM3 (ref 140–450)
PMV BLD AUTO: 10.3 FL (ref 6–12)
POTASSIUM BLD-SCNC: 3.8 MMOL/L (ref 3.5–5.2)
RBC # BLD AUTO: 3.71 10*6/MM3 (ref 3.77–5.28)
SODIUM BLD-SCNC: 143 MMOL/L (ref 136–145)
WBC NRBC COR # BLD: 10.37 10*3/MM3 (ref 3.4–10.8)

## 2019-09-04 PROCEDURE — 99232 SBSQ HOSP IP/OBS MODERATE 35: CPT | Performed by: INTERNAL MEDICINE

## 2019-09-04 PROCEDURE — 94799 UNLISTED PULMONARY SVC/PX: CPT

## 2019-09-04 PROCEDURE — 25010000002 HEPARIN (PORCINE) PER 1000 UNITS: Performed by: NURSE PRACTITIONER

## 2019-09-04 PROCEDURE — 82962 GLUCOSE BLOOD TEST: CPT

## 2019-09-04 PROCEDURE — 80048 BASIC METABOLIC PNL TOTAL CA: CPT | Performed by: NURSE PRACTITIONER

## 2019-09-04 PROCEDURE — 25010000002 METHYLPREDNISOLONE PER 40 MG: Performed by: INTERNAL MEDICINE

## 2019-09-04 PROCEDURE — 97161 PT EVAL LOW COMPLEX 20 MIN: CPT

## 2019-09-04 PROCEDURE — 83036 HEMOGLOBIN GLYCOSYLATED A1C: CPT | Performed by: NURSE PRACTITIONER

## 2019-09-04 PROCEDURE — 85027 COMPLETE CBC AUTOMATED: CPT | Performed by: NURSE PRACTITIONER

## 2019-09-04 PROCEDURE — 25010000002 METHYLPREDNISOLONE PER 40 MG: Performed by: NURSE PRACTITIONER

## 2019-09-04 RX ORDER — DOCUSATE SODIUM 100 MG/1
100 CAPSULE, LIQUID FILLED ORAL 2 TIMES DAILY
Status: DISCONTINUED | OUTPATIENT
Start: 2019-09-04 | End: 2019-09-06 | Stop reason: HOSPADM

## 2019-09-04 RX ORDER — METHYLPREDNISOLONE SODIUM SUCCINATE 40 MG/ML
40 INJECTION, POWDER, LYOPHILIZED, FOR SOLUTION INTRAMUSCULAR; INTRAVENOUS EVERY 12 HOURS
Status: DISCONTINUED | OUTPATIENT
Start: 2019-09-04 | End: 2019-09-06 | Stop reason: HOSPADM

## 2019-09-04 RX ADMIN — DICLOFENAC SODIUM 4 G: 10 GEL TOPICAL at 08:43

## 2019-09-04 RX ADMIN — HEPARIN SODIUM 5000 UNITS: 5000 INJECTION INTRAVENOUS; SUBCUTANEOUS at 06:22

## 2019-09-04 RX ADMIN — BUSPIRONE HYDROCHLORIDE 5 MG: 5 TABLET ORAL at 21:28

## 2019-09-04 RX ADMIN — NICOTINE 1 PATCH: 21 PATCH, EXTENDED RELEASE TRANSDERMAL at 18:00

## 2019-09-04 RX ADMIN — FLUOXETINE HYDROCHLORIDE 20 MG: 20 CAPSULE ORAL at 08:43

## 2019-09-04 RX ADMIN — BUDESONIDE 0.5 MG: 0.5 INHALANT RESPIRATORY (INHALATION) at 18:50

## 2019-09-04 RX ADMIN — IPRATROPIUM BROMIDE AND ALBUTEROL SULFATE 3 ML: 2.5; .5 SOLUTION RESPIRATORY (INHALATION) at 13:02

## 2019-09-04 RX ADMIN — LOSARTAN POTASSIUM 100 MG: 50 TABLET ORAL at 08:43

## 2019-09-04 RX ADMIN — INSULIN LISPRO 2 UNITS: 100 INJECTION, SOLUTION INTRAVENOUS; SUBCUTANEOUS at 12:13

## 2019-09-04 RX ADMIN — QUETIAPINE FUMARATE 50 MG: 25 TABLET ORAL at 21:28

## 2019-09-04 RX ADMIN — METHYLPREDNISOLONE SODIUM SUCCINATE 30 MG: 40 INJECTION, POWDER, FOR SOLUTION INTRAMUSCULAR; INTRAVENOUS at 06:22

## 2019-09-04 RX ADMIN — HEPARIN SODIUM 5000 UNITS: 5000 INJECTION INTRAVENOUS; SUBCUTANEOUS at 21:28

## 2019-09-04 RX ADMIN — SODIUM CHLORIDE, PRESERVATIVE FREE 10 ML: 5 INJECTION INTRAVENOUS at 08:50

## 2019-09-04 RX ADMIN — IPRATROPIUM BROMIDE AND ALBUTEROL SULFATE 3 ML: 2.5; .5 SOLUTION RESPIRATORY (INHALATION) at 18:50

## 2019-09-04 RX ADMIN — CETIRIZINE HYDROCHLORIDE 10 MG: 10 TABLET, FILM COATED ORAL at 08:48

## 2019-09-04 RX ADMIN — DOCUSATE SODIUM 100 MG: 100 CAPSULE, LIQUID FILLED ORAL at 21:50

## 2019-09-04 RX ADMIN — HYDROCODONE BITARTRATE AND ACETAMINOPHEN 1 TABLET: 10; 325 TABLET ORAL at 14:27

## 2019-09-04 RX ADMIN — FLUTICASONE PROPIONATE 2 SPRAY: 50 SPRAY, METERED NASAL at 08:43

## 2019-09-04 RX ADMIN — SODIUM CHLORIDE, PRESERVATIVE FREE 10 ML: 5 INJECTION INTRAVENOUS at 21:28

## 2019-09-04 RX ADMIN — Medication 250 MG: at 21:28

## 2019-09-04 RX ADMIN — ATORVASTATIN CALCIUM 20 MG: 20 TABLET, FILM COATED ORAL at 08:43

## 2019-09-04 RX ADMIN — ACETAMINOPHEN 650 MG: 325 TABLET ORAL at 01:01

## 2019-09-04 RX ADMIN — GABAPENTIN 800 MG: 400 CAPSULE ORAL at 21:28

## 2019-09-04 RX ADMIN — POTASSIUM CHLORIDE 20 MEQ: 750 CAPSULE, EXTENDED RELEASE ORAL at 08:43

## 2019-09-04 RX ADMIN — IPRATROPIUM BROMIDE AND ALBUTEROL SULFATE 3 ML: 2.5; .5 SOLUTION RESPIRATORY (INHALATION) at 00:06

## 2019-09-04 RX ADMIN — METHYLPREDNISOLONE SODIUM SUCCINATE 40 MG: 40 INJECTION, POWDER, FOR SOLUTION INTRAMUSCULAR; INTRAVENOUS at 18:00

## 2019-09-04 RX ADMIN — INSULIN LISPRO 2 UNITS: 100 INJECTION, SOLUTION INTRAVENOUS; SUBCUTANEOUS at 18:00

## 2019-09-04 RX ADMIN — BUMETANIDE 1 MG: 1 TABLET ORAL at 08:43

## 2019-09-04 RX ADMIN — HYDROCODONE BITARTRATE AND ACETAMINOPHEN 1 TABLET: 10; 325 TABLET ORAL at 08:47

## 2019-09-04 RX ADMIN — HEPARIN SODIUM 5000 UNITS: 5000 INJECTION INTRAVENOUS; SUBCUTANEOUS at 14:27

## 2019-09-04 RX ADMIN — GABAPENTIN 800 MG: 400 CAPSULE ORAL at 14:26

## 2019-09-04 RX ADMIN — POTASSIUM CHLORIDE 20 MEQ: 750 CAPSULE, EXTENDED RELEASE ORAL at 17:59

## 2019-09-04 RX ADMIN — BUDESONIDE 0.5 MG: 0.5 INHALANT RESPIRATORY (INHALATION) at 07:19

## 2019-09-04 RX ADMIN — DICLOFENAC SODIUM 4 G: 10 GEL TOPICAL at 21:28

## 2019-09-04 RX ADMIN — GABAPENTIN 800 MG: 400 CAPSULE ORAL at 06:22

## 2019-09-04 RX ADMIN — BUSPIRONE HYDROCHLORIDE 5 MG: 5 TABLET ORAL at 14:27

## 2019-09-04 RX ADMIN — Medication 250 MG: at 08:43

## 2019-09-04 RX ADMIN — IPRATROPIUM BROMIDE AND ALBUTEROL SULFATE 3 ML: 2.5; .5 SOLUTION RESPIRATORY (INHALATION) at 07:19

## 2019-09-04 RX ADMIN — HYDROCODONE BITARTRATE AND ACETAMINOPHEN 1 TABLET: 10; 325 TABLET ORAL at 21:28

## 2019-09-04 RX ADMIN — PANTOPRAZOLE SODIUM 40 MG: 40 TABLET, DELAYED RELEASE ORAL at 06:22

## 2019-09-04 RX ADMIN — BUSPIRONE HYDROCHLORIDE 5 MG: 5 TABLET ORAL at 06:22

## 2019-09-04 NOTE — PLAN OF CARE
Problem: Patient Care Overview  Goal: Plan of Care Review   09/04/19 1928   Coping/Psychosocial   Plan of Care Reviewed With patient   Plan of Care Review   Progress improving   OTHER   Outcome Summary Patient VSS. patient is still dependent of 2 LO2 via NC. Patient has yet to have a BM.        Problem: Chronic Obstructive Pulmonary Disease (Adult)  Intervention: Reduce/Relieve Breathlessness   09/03/19 2000 09/04/19 1800   Cognitive Interventions   Sensory Stimulation Regulation care clustered --    Coping/Psychosocial Interventions   Environmental Support --  calm environment promoted     Intervention: Prevent/Manage Infection   09/04/19 1928   Safety Management   Infection Prevention single patient room provided     Intervention: Optimize Functional Ability/Increase Activity Tolerance   09/04/19 1800   Activity   Activity Management activity adjusted per tolerance     Intervention: Optimize Oxygenation/Ventilation/Perfusion   09/04/19 1700   Positioning   Head of Bed (HOB) HOB at 30-45 degrees     Intervention: Support/Optimize Psychosocial Response to Chronic Pulmonary Disease   09/04/19 1800   Coping/Psychosocial Interventions   Supportive Measures active listening utilized   Interventions   Trust Relationship/Rapport care explained   Psychosocial Support   Family/Support System Care self-care encouraged

## 2019-09-04 NOTE — CONSULTS
Referring Provider: ROSEMARY Vieyra MD  Reason for Consultation: AECOPD, Smoking Cessation    Subjective .   Education: NN spoke with pt at BS.  Pt alert and able to answer questions appropriately.  Pt states use of rescue neb 3-4   times weekly, relief of SOB within   2-3 mins.Per pt report, she does not need help obtaining medications or getting to and from appointments.  She states no problem remembering to take medications, but does need her neb script renewed.  She states that at baseline she is able to complete her ADLs with no problem, albeit at a slow pace.  She is not up to date on vaccinations, will need PNA part 2 and flu shot for this season.  Pt reports no previous hx of formal COPD teaching, no understanding of action plan, or MT.  COPD education completed in the form of explanation, handouts, and videos.  COPD Taking Control started at BS.Stop light report, NN contact information, instructions for accessing iTGR and list of educational videos given to pt.  No new concerns or questions voiced at this time.  NN will continue to follow as needed.    Age: 63 y.o.  Sex: female  Smoker Status: Active, [id number]+yrs  Pulmonologist: NA  FEV1 (PFT): NA  Home O2: NA    Objective     SpO2 SpO2: 91 % (09/04/19 0815)  Device Device (Oxygen Therapy): nasal cannula (09/04/19 0719)  Flow Flow (L/min): 2 (09/04/19 0719)  Incentive Spirometer  Demonstrated  IS Predicted Level (mL)     Number of Repetitions   5  Level Incentive Spirometer (mL)  1500  Patient Tolerance   Tolerated  Inhaler Treatment Status    Treatment Route        Home Medications:  Medications Prior to Admission   Medication Sig Dispense Refill Last Dose   • albuterol (PROVENTIL HFA;VENTOLIN HFA) 108 (90 BASE) MCG/ACT inhaler Inhale 2 puffs every 4 (four) hours as needed for wheezing.   9/2/2019 at Unknown time   • atorvastatin (LIPITOR) 20 MG tablet Take 20 mg by mouth Daily.   9/3/2019 at Unknown time   • budesonide-formoterol (SYMBICORT) 160-4.5 MCG/ACT  inhaler Inhale 2 puffs 2 (Two) Times a Day.   9/3/2019 at Unknown time   • bumetanide (BUMEX) 1 MG tablet Take 1 mg by mouth 2 (Two) Times a Day.   9/3/2019 at Unknown time   • busPIRone (BUSPAR) 5 MG tablet Take 10 mg by mouth 2 (Two) Times a Day.   9/3/2019 at Unknown time   • Calcium Carbonate-Vit D-Min (CALCIUM 1200 PO) Take 1,200 mg by mouth 2 (Two) Times a Day.   9/3/2019 at Unknown time   • Cholecalciferol (VITAMIN D3) 2000 units tablet Take 2,000 Units by mouth Daily.   9/3/2019 at Unknown time   • diclofenac (VOLTAREN) 1 % gel gel Apply 4 g topically to the appropriate area as directed 4 (Four) Times a Day As Needed (feet and knees).   Past Week at Unknown time   • diclofenac (VOLTAREN) 50 MG EC tablet Take 50 mg by mouth 2 (Two) Times a Day.   9/3/2019 at Unknown time   • docusate sodium (COLACE) 250 MG capsule Take 250 mg by mouth 2 (Two) Times a Day.   9/3/2019 at Unknown time   • fexofenadine (ALLEGRA) 180 MG tablet Take 180 mg by mouth daily.   9/3/2019 at Unknown time   • FLUoxetine (PROzac) 20 MG capsule Take 20 mg by mouth daily.   9/3/2019 at Unknown time   • fluticasone (FLONASE) 50 MCG/ACT nasal spray 2 sprays into the nostril(s) as directed by provider Daily. Administer 2 sprays in each nostril for each dose.    9/3/2019 at Unknown time   • gabapentin (NEURONTIN) 800 MG tablet Take 800 mg by mouth 3 (Three) Times a Day.   9/3/2019 at 0930   • HYDROcodone-acetaminophen (NORCO)  MG per tablet Take 1 tablet by mouth Every 6 (Six) Hours.   9/3/2019 at 0930   • hydrOXYzine (ATARAX) 25 MG tablet Take 25 mg by mouth 4 (Four) Times a Day.   9/3/2019 at Unknown time   • losartan (COZAAR) 100 MG tablet Take 100 mg by mouth Daily.   9/3/2019 at Unknown time   • Melatonin 10 MG tablet Take 40 mg by mouth Every Night. 4 tabs (40mg total) nightly   9/2/2019 at Unknown time   • misoprostol (CYTOTEC) 200 MCG tablet Take 200 mcg by mouth 2 (Two) Times a Day.   9/3/2019 at Unknown time   • nicotine  (NICODERM CQ) 21 MG/24HR patch Place 1 patch on the skin as directed by provider Daily.   Past Month at Unknown time   • nitroglycerin (NITROSTAT) 0.4 MG SL tablet Place 0.4 mg under the tongue every 5 (five) minutes as needed for chest pain. Take no more than 3 doses in 15 minutes.      • nystatin (MYCOSTATIN) 431074 UNIT/GM cream Apply  topically to the appropriate area as directed As Needed.   9/2/2019 at Unknown time   • nystatin (MYCOSTATIN) 906477 UNIT/GM powder Apply  topically to the appropriate area as directed As Needed.      • omeprazole (priLOSEC) 40 MG capsule Take 40 mg by mouth 2 (Two) Times a Day.   9/3/2019 at Unknown time   • potassium chloride (K-DUR,KLOR-CON) 20 MEQ CR tablet Take 20 mEq by mouth Daily.   9/3/2019 at Unknown time   • Probiotic capsule Take 1 capsule by mouth Daily.   9/3/2019 at Unknown time   • promethazine-dextromethorphan (PROMETHAZINE-DM) 6.25-15 MG/5ML syrup Take 5 mL by mouth As Needed.   9/2/2019 at Unknown time   • QUEtiapine (SEROquel) 50 MG tablet Take 50 mg by mouth Every Night.   9/2/2019 at Unknown time   • tiZANidine (ZANAFLEX) 4 MG tablet Take 4 mg by mouth 3 (three) times a day.   9/3/2019 at Unknown time   • triamcinolone (KENALOG) 0.1 % ointment Apply  topically to the appropriate area as directed As Needed.      • vitamin B-12 (CYANOCOBALAMIN) 2500 MCG sublingual tablet tablet Place  under the tongue Daily.   9/3/2019 at Unknown time   • vitamin C (ASCORBIC ACID) 500 MG tablet Take 1,000 mg by mouth Daily.   Past Week at Unknown time   • VITAMIN E PO Take 1 capsule by mouth Daily.   9/3/2019 at Unknown time       Discussion: Per current GOLD Standards, please consider: NRT upon discharge, pt states that she hopes for PCP to manage COPD, possible HH for PT/OT for exercise endurance (would not qualify for OP CO at this time)      Discussed with MD Rosie Lao, RN

## 2019-09-04 NOTE — THERAPY EVALUATION
Patient Name: Quyen Galvan  : 1955    MRN: [id number]                              Today's Date: 2019       Admit Date: 9/3/2019    Visit Dx:     ICD-10-CM ICD-9-CM   1. COPD exacerbation (CMS/[facility]) J44.1 491.21   2. Acute on chronic respiratory failure with hypoxia (CMS/[facility]) J96.21 518.84     799.02     Patient Active Problem List   Diagnosis   • Acute on chronic respiratory failure with hypoxia and hypercapnia (CMS/HCC)   • COPD with acute exacerbation (CMS/[facility])   • Tobacco abuse   • Diarrhea   • Essential hypertension   • Hyperglycemia   • Arthritis   • Leukocytosis     Past Medical History:   Diagnosis Date   • Anemia    • Arthritis    • COPD (chronic obstructive pulmonary disease) (CMS/[facility])    • Fibromyalgia    • Hyperlipidemia    • Hypertension      Past Surgical History:   Procedure Laterality Date   • CHOLECYSTECTOMY     • FRACTURE SURGERY      left wrist   • OVARIAN CYST REMOVAL     • SPINE SURGERY       General Information     Row Name 19 1605          PT Evaluation Time/Intention    Document Type  evaluation  -KR     Mode of Treatment  physical therapy  -KR     Row Name 19 1605          General Information    Patient Profile Reviewed?  yes  -KR     Prior Level of Function  independent:;all household mobility;gait;transfer;ADL's;dressing;bathing  -KR     Existing Precautions/Restrictions  oxygen therapy device and L/min  -KR     Barriers to Rehab  none identified  -KR     Row Name 19 1605          Relationship/Environment    Lives With  significant other  -KR     Row Name 19 1605          Resource/Environmental Concerns    Current Living Arrangements  home/apartment/condo  -KR     Row Name 19 1605          Home Main Entrance    Number of Stairs, Main Entrance  four  -KR     Stair Railings, Main Entrance  railing on left side (ascending)  -KR     Row Name 19 1605          Stairs Within Home, Primary    Number of Stairs, Within Home, Primary  none  -KR      Row Name 09/04/19 1605          Cognitive Assessment/Intervention- PT/OT    Orientation Status (Cognition)  oriented x 4  -KR     Row Name 09/04/19 1605          Safety Issues, Functional Mobility    Safety Issues Affecting Function (Mobility)  insight into deficits/self awareness;safety precaution awareness;safety precautions follow-through/compliance  -KR     Impairments Affecting Function (Mobility)  endurance/activity tolerance;shortness of breath;strength  -KR       User Key  (r) = Recorded By, (t) = Taken By, (c) = Cosigned By    Initials Name Provider Type    KR Bailey Polanco, PT Physical Therapist        Mobility     Row Name 09/04/19 1606          Bed Mobility Assessment/Treatment    Bed Mobility Assessment/Treatment  supine-sit;sit-supine  -KR     Supine-Sit [place] (Bed Mobility)  supervision  -KR     Sit-Supine [place] (Bed Mobility)  supervision  -KR     Comment (Bed Mobility)  Pt demonstrated appropriate technique with bed mobility.   -KR     Row Name 09/04/19 1606          Transfer Assessment/Treatment    Comment (Transfers)  VC's for sequencing.   -KR     Row Name 09/04/19 1606          Sit-Stand Transfer    Sit-Stand [place] (Transfers)  stand by assist;verbal cues  -KR     Row Name 09/04/19 1606          Gait/Stairs Assessment/Training    Gait/Stairs Assessment/Training  gait/ambulation independence  -KR     [place] Level (Gait)  contact guard;verbal cues progressed to periods of SBA  -KR     Distance in Feet (Gait)  400  -KR     Pattern (Gait)  step-through  -KR     Deviations/Abnormal Patterns (Gait)  base of support, narrow;any decreased;stride length decreased  -KR     Bilateral Gait Deviations  heel strike decreased  -KR     Comment (Gait/Stairs)  Pt demonstrated step through gait pattern with slow any and decreased step length. Pt with good stability and no LOB. Pt's mobility limited by SOA.   -KR       User Key  (r) = Recorded By, (t) = Taken By, (c) = Cosigned  By    Initials Name Provider Type    Bailey Jones, PT Physical Therapist        Obj/Interventions     Row Name 09/04/19 1608          General ROM    GENERAL ROM COMMENTS  BLE WFL   -KR     Row Name 09/04/19 1608          MMT (Manual Muscle Testing)    General MMT Comments  BLE grossly 4/5  -KR     Row Name 09/04/19 1608          Static Sitting Balance    Level of [place] (Unsupported Sitting, Static Balance)  independent  -KR     Sitting Position (Unsupported Sitting, Static Balance)  sitting on edge of bed  -KR     Row Name 09/04/19 1608          Static Standing Balance    Level of [place] (Supported Standing, Static Balance)  supervision  -KR     Row Name 09/04/19 1608          Dynamic Standing Balance    Level of [place], Reaches Outside Midline (Standing, Dynamic Balance)  standby assist  -KR     Row Name 09/04/19 1608          Sensory Assessment/Intervention    Sensory General Assessment  no sensation deficits identified  -KR       User Key  (r) = Recorded By, (t) = Taken By, (c) = Cosigned By    Initials Name Provider Type    Bailey Jones PT Physical Therapist        Goals/Plan     [facility] Name 09/04/19 1610          Bed Mobility Goal 1 (PT)    Activity/Assistive Device (Bed Mobility Goal 1, PT)  sit to supine;supine to sit  -KR     [place] Level/Cues Needed (Bed Mobility Goal 1, PT)  independent  -KR     Time Frame (Bed Mobility Goal 1, PT)  2 weeks  -KR     Progress/Outcomes (Bed Mobility Goal 1, PT)  goal ongoing  -KR     [facility] Name 09/04/19 1610          Transfer Goal 1 (PT)    Activity/Assistive Device (Transfer Goal 1, PT)  sit-to-stand/stand-to-sit;bed-to-chair/chair-to-bed  -KR     [place] Level/Cues Needed (Transfer Goal 1, PT)  independent  -KR     Time Frame (Transfer Goal 1, PT)  2 weeks  -KR     Progress/Outcome (Transfer Goal 1, PT)  goal ongoing  -KR     [facility] Name 09/04/19 1610          Gait Training Goal 1 (PT)    Activity/Assistive Device (Gait Training Goal 1,  PT)  gait (walking locomotion)  -KR     [place] Level (Gait Training Goal 1, PT)  independent  -KR     Distance (Gait Goal 1, PT)  400 feet  -KR     Time Frame (Gait Training Goal 1, PT)  2 weeks  -KR     Progress/Outcome (Gait Training Goal 1, PT)  goal ongoing  -KR     Row Name 09/04/19 1610          Stairs Goal 1 (PT)    Activity/Assistive Device (Stairs Goal 1, PT)  ascending stairs;descending stairs;using handrail, left;step-to-step  -KR     [place] Level/Cues Needed (Stairs Goal 1, PT)  supervision required  -KR     Number of Stairs (Stairs Goal 1, PT)  4  -KR     Time Frame (Stairs Goal 1, PT)  2 weeks  -KR     Progress/Outcome (Stairs Goal 1, PT)  goal ongoing  -KR       User Key  (r) = Recorded By, (t) = Taken By, (c) = Cosigned By    Initials Name Provider Type    Bailey Jones, PT Physical Therapist        Clinical Impression     Row Name 09/04/19 1608          Pain Assessment    Additional Documentation  Pain Scale: Numbers Pre/Post-Treatment (Group)  -KR     [facility] Name 09/04/19 1608          Pain Scale: Numbers Pre/Post-Treatment    Pain Scale: Numbers, Pretreatment  0/10 - no pain  -KR     Pain Scale: Numbers, Post-Treatment  0/10 - no pain  -KR     Row Name 09/04/19 1608          Plan of Care Review    Plan of Care Reviewed With  patient  -KR     [facility] Name 09/04/19 1608          Physical Therapy Clinical Impression    Patient/Family Goals Statement (PT Clinical Impression)  return to PLOF  -KR     Criteria for Skilled Interventions Met (PT Clinical Impression)  yes;treatment indicated  -KR     Rehab Potential (PT Clinical Summary)  good, to achieve stated therapy goals  -KR     Row Name 09/04/19 1608          Vital Signs    Pre Systolic BP Rehab  136  -KR     Pre Treatment Diastolic BP  70  -KR     Pretreatment Heart Rate (beats/min)  77  -KR     Posttreatment Heart Rate (beats/min)  80  -KR     Pre SpO2 (%)  93  -KR     O2 Delivery Pre Treatment  supplemental O2  -KR     Post SpO2 (%)  95   -KR     O2 Delivery Post Treatment  supplemental O2  -KR     Pre Patient Position  Supine  -KR     Intra Patient Position  Standing  -KR     Post Patient Position  Supine  -KR     Row Name 09/04/19 1608          Positioning and Restraints    Pre-Treatment Position  in bed  -KR     Post Treatment Position  bed  -KR     In Bed  notified nsg;supine;call light within reach;encouraged to call for assist;side rails up x2  -KR       User Key  (r) = Recorded By, (t) = Taken By, (c) = Cosigned By    Initials Name Provider Type    Bailey Jones PT Physical Therapist        Outcome Measures     Row Name 09/04/19 1614          How much help from another person do you currently need...    Turning from your back to your side while in flat bed without using bedrails?  4  -KR     Moving from lying on back to sitting on the side of a flat bed without bedrails?  3  -KR     Moving to and from a bed to a chair (including a wheelchair)?  3  -KR     Standing up from a chair using your arms (e.g., wheelchair, bedside chair)?  3  -KR     Climbing 3-5 steps with a railing?  3  -KR     To walk in hospital room?  3  -KR     AM-PAC 6 Clicks Score (PT)  19  -KR     Row Name 09/04/19 1614          Functional Assessment    Outcome Measure Options  AM-PAC 6 Clicks Basic Mobility (PT)  -KR       User Key  (r) = Recorded By, (t) = Taken By, (c) = Cosigned By    Initials Name Provider Type    Bailey Jones PT Physical Therapist        Physical Therapy Education     Title: PT OT SLP Therapies (In Progress)     Topic: Physical Therapy (In Progress)     Point: Mobility training (In Progress)     Learning Progress Summary           Patient Acceptance, E, NR by NIKI at 9/4/2019  2:11 PM                   Point: Body mechanics (In Progress)     Learning Progress Summary           Patient Acceptance, E, NR by NIKI at 9/4/2019  2:11 PM                   Point: Precautions (In Progress)     Learning Progress Summary           Patient Acceptance, E,  NR by NIKI at 9/4/2019  2:11 PM                               User Key     Initials Effective Dates Name Provider Type Discipline     04/03/18 -  Bailey Polanco PT Physical Therapist PT              PT Recommendation and Plan  Planned Therapy Interventions (PT Eval): balance training, bed mobility training, gait training, stair training, strengthening, transfer training  Outcome Summary/Treatment Plan (PT)  Anticipated Discharge Disposition (PT): home with assist  Plan of Care Reviewed With: patient  Outcome Summary: PT initial evaluation completed for pt presenting with increased SOA, generalized weakness, and decreased functional mobility. Pt ambulated 400ft with CGA, progressing to periods of SBA. Pt's decreased independence warrants PT skilled care. Recommend D/C home with assistance.      Time Calculation:   PT Charges     Row Name 09/04/19 1411             Time Calculation    Start Time  1411  -KR      PT Received On  09/04/19  -NIKI      PT Goal Re-Cert Due Date  09/14/19  -NIKI        User Key  (r) = Recorded By, (t) = Taken By, (c) = Cosigned By    Initials Name Provider Type    Bailey Jones PT Physical Therapist        Therapy Charges for Today     Code Description Service Date Service Provider Modifiers Qty    [id number]  PT EVAL LOW COMPLEXITY 4 9/4/2019 Bailey Polanco, PT GP 1          PT G-Codes  Outcome Measure Options: AM-PAC 6 Clicks Basic Mobility (PT)  AM-PAC 6 Clicks Score (PT): 19    Xin Polanco PT  9/4/2019

## 2019-09-04 NOTE — PROGRESS NOTES
[facility] Medicine Services  PROGRESS NOTE    Patient Name: Quyen Galvan  : 1955  MRN: [id number]    Date of Admission: 9/3/2019  Length of Stay: 1  Primary Care Physician: Akshat Dawson MD    Subjective   Subjective     CC:  Shortness of breath    HPI:  Dyspnea improved today compared to yesterday. Cough continues, productive of white to grayish sputum. Still some wheezing. Denies pleuritic chest pain.    Review of Systems  Gen- No fevers, chills  CV- No chest pain, palpitations  Resp- cough and shortness of breath as above  GI- No N/V/D, abd pain    All other systems reviewed and negative except any additional pertinent positives and negatives discussed in HPI.       Objective   Objective     Vital Signs:   Temp:  [97.1 °F (36.2 °C)-98.4 °F (36.9 °C)] 97.9 °F (36.6 °C)  Heart Rate:  [65-81] 79  Resp:  [18-20] 20  BP: (120-146)/(59-72) 136/70        Physical Exam:  Constitutional -no acute distress, non toxic, in bed  HEENT-NCAT, mucous membranes moist  CV-RRR, S1 S2 normal, no m/r/g  Resp-diminished bilaterally, with a few scattered expiratory wheezes  Abd-soft, non-tender, non-distended, normo active bowel sounds  Ext-No lower extremity cyanosis, clubbing or edema bilaterally  Neuro-alert and oriented, speech clear, moves all extremities   Psych-normal affect   Skin- No rash on exposed UE or LE bilaterally      Results Reviewed:    Results from last 7 days   Lab Units 19  0544 19  1336   WBC 10*3/mm3 10.37 14.91*   HEMOGLOBIN g/dL 11.1* 11.7*   HEMATOCRIT % 35.0 36.5   PLATELETS 10*3/mm3 279 279     Results from last 7 days   Lab Units 19  0544 19  1336   SODIUM mmol/L 143 140   POTASSIUM mmol/L 3.8 3.5   CHLORIDE mmol/L 96* 93*   CO2 mmol/L 35.0* 34.0*   BUN mg/dL 18 13   CREATININE mg/dL 0.98 1.00   GLUCOSE mg/dL 107* 134*   CALCIUM mg/dL 8.9 8.8   ALT (SGPT) U/L  --  11   AST (SGOT) U/L  --  11   TROPONIN T ng/mL  --  <0.010   PROBNP pg/mL  --   248.2     Estimated Creatinine Clearance: 63.9 mL/min (by C-G formula based on SCr of 0.98 mg/dL).    Microbiology Results Abnormal     None          Imaging Results (last 24 hours)     ** No results found for the last 24 hours. **               I have reviewed the medications:  Scheduled Meds:  atorvastatin 20 mg Oral Daily   budesonide 0.5 mg Nebulization BID - RT   bumetanide 1 mg Oral Daily   busPIRone 5 mg Oral Q8H   cetirizine 10 mg Oral Daily   diclofenac 4 g Topical 4x Daily   FLUoxetine 20 mg Oral Daily   fluticasone 2 spray Nasal Daily   gabapentin 800 mg Oral Q8H   heparin (porcine) 5,000 Units Subcutaneous Q8H   insulin lispro 0-7 Units Subcutaneous 4x Daily With Meals & Nightly   ipratropium-albuterol 3 mL Nebulization Q6H - RT   losartan 100 mg Oral Daily   methylPREDNISolone sodium succinate 30 mg Intravenous Q12H   nicotine 1 patch Transdermal Q24H   pantoprazole 40 mg Oral QAM   pharmacy consult - MTM  Does not apply Daily   potassium chloride 20 mEq Oral BID With Meals   QUEtiapine 50 mg Oral Nightly   saccharomyces boulardii 250 mg Oral BID   sodium chloride 10 mL Intravenous Q12H     Continuous Infusions:   PRN Meds:.•  acetaminophen **OR** acetaminophen **OR** acetaminophen  •  albuterol  •  benzonatate  •  calcium carbonate  •  dextrose  •  dextrose  •  glucagon (human recombinant)  •  HYDROcodone-acetaminophen  •  hydrOXYzine  •  melatonin  •  ondansetron  •  sodium chloride  •  sodium chloride  •  tiZANidine  •  traZODone      Assessment/Plan   Assessment / Plan     Active Hospital Problems    Diagnosis  POA   • Acute on chronic respiratory failure with hypoxia and hypercapnia (CMS/HCC) [J96.21, J96.22]  Yes   • COPD with acute exacerbation (CMS/HCC) [J44.1]  Unknown   • Tobacco abuse [Z72.0]  Unknown   • Diarrhea [R19.7]  Unknown   • Essential hypertension [I10]  Unknown   • Hyperglycemia [R73.9]  Unknown   • Arthritis [M19.90]  Unknown   • Leukocytosis [D72.829]  Unknown      Resolved  Hospital Problems   No resolved problems to display.        Brief Hospital Course to date:  Quyen Galvan is a 63 y.o. female with history of COPD and ongoing tobacco abuse presents with shortness of breath.    COPD with AE  - continue solumedrol, patient with improvement in dyspnea today  - possible transition to prednisone taper in am    Tobacco abuse  - counseled cessation, nicotine replacement    Diarrhea  - resolved, no bm for 36 hours    Leukocytosis  - resolved, afebrile    Hyperglycemia  - a1c 6.3  - some element of steroid induced elevation of blood sugars    DVT Prophylaxis:  heparin    Disposition: I expect the patient to be discharged home in 1-2 days.    CODE STATUS:   Code Status and Medical Interventions:   Ordered at: 09/03/19 5126     Level Of Support Discussed With:    Patient     Code Status:    CPR     Medical Interventions (Level of Support Prior to Arrest):    Full         Electronically signed by Lenny Vieyra MD, 09/04/19, 4:10 PM.

## 2019-09-04 NOTE — PLAN OF CARE
Problem: Patient Care Overview  Goal: Plan of Care Review  Outcome: Ongoing (interventions implemented as appropriate)   09/04/19 0313   Coping/Psychosocial   Plan of Care Reviewed With patient   Plan of Care Review   Progress no change   OTHER   Outcome Summary Pt has not had a BM yet this shift so we can't rule out c-diff at this time. VSS. Will continue to monitor. 0300 9-4-19

## 2019-09-04 NOTE — PLAN OF CARE
Problem: Patient Care Overview  Goal: Plan of Care Review  Outcome: Ongoing (interventions implemented as appropriate)   09/04/19 1411   Coping/Psychosocial   Plan of Care Reviewed With patient   OTHER   Outcome Summary PT initial evaluation completed for pt presenting with increased SOA, generalized weakness, and decreased functional mobility. Pt ambulated 400ft with CGA, progressing to periods of SBA. Pt's decreased independence warrants PT skilled care. Recommend D/C home with assistance.

## 2019-09-05 ENCOUNTER — APPOINTMENT (OUTPATIENT)
Dept: GENERAL RADIOLOGY | Facility: HOSPITAL | Age: 64
End: 2019-09-05

## 2019-09-05 LAB
GLUCOSE BLDC GLUCOMTR-MCNC: 123 MG/DL (ref 70–130)
GLUCOSE BLDC GLUCOMTR-MCNC: 132 MG/DL (ref 70–130)
GLUCOSE BLDC GLUCOMTR-MCNC: 139 MG/DL (ref 70–130)
GLUCOSE BLDC GLUCOMTR-MCNC: 142 MG/DL (ref 70–130)

## 2019-09-05 PROCEDURE — 71045 X-RAY EXAM CHEST 1 VIEW: CPT

## 2019-09-05 PROCEDURE — 25010000002 METHYLPREDNISOLONE PER 40 MG: Performed by: INTERNAL MEDICINE

## 2019-09-05 PROCEDURE — 97530 THERAPEUTIC ACTIVITIES: CPT

## 2019-09-05 PROCEDURE — 63710000001 INSULIN LISPRO (HUMAN) PER 5 UNITS: Performed by: NURSE PRACTITIONER

## 2019-09-05 PROCEDURE — 94799 UNLISTED PULMONARY SVC/PX: CPT

## 2019-09-05 PROCEDURE — 25010000002 HEPARIN (PORCINE) PER 1000 UNITS: Performed by: NURSE PRACTITIONER

## 2019-09-05 PROCEDURE — 82962 GLUCOSE BLOOD TEST: CPT

## 2019-09-05 PROCEDURE — 99232 SBSQ HOSP IP/OBS MODERATE 35: CPT | Performed by: INTERNAL MEDICINE

## 2019-09-05 RX ORDER — PANTOPRAZOLE SODIUM 40 MG/1
40 TABLET, DELAYED RELEASE ORAL
Status: DISCONTINUED | OUTPATIENT
Start: 2019-09-05 | End: 2019-09-06 | Stop reason: HOSPADM

## 2019-09-05 RX ORDER — DOXYCYCLINE 100 MG/1
100 CAPSULE ORAL EVERY 12 HOURS SCHEDULED
Status: DISCONTINUED | OUTPATIENT
Start: 2019-09-05 | End: 2019-09-06 | Stop reason: HOSPADM

## 2019-09-05 RX ADMIN — Medication 250 MG: at 08:41

## 2019-09-05 RX ADMIN — DICLOFENAC SODIUM 4 G: 10 GEL TOPICAL at 18:54

## 2019-09-05 RX ADMIN — GABAPENTIN 800 MG: 400 CAPSULE ORAL at 05:41

## 2019-09-05 RX ADMIN — HEPARIN SODIUM 5000 UNITS: 5000 INJECTION INTRAVENOUS; SUBCUTANEOUS at 05:41

## 2019-09-05 RX ADMIN — SODIUM CHLORIDE, PRESERVATIVE FREE 10 ML: 5 INJECTION INTRAVENOUS at 08:41

## 2019-09-05 RX ADMIN — FLUOXETINE HYDROCHLORIDE 20 MG: 20 CAPSULE ORAL at 08:40

## 2019-09-05 RX ADMIN — IPRATROPIUM BROMIDE AND ALBUTEROL SULFATE 3 ML: 2.5; .5 SOLUTION RESPIRATORY (INHALATION) at 19:26

## 2019-09-05 RX ADMIN — BUDESONIDE 0.5 MG: 0.5 INHALANT RESPIRATORY (INHALATION) at 07:27

## 2019-09-05 RX ADMIN — GABAPENTIN 800 MG: 400 CAPSULE ORAL at 15:15

## 2019-09-05 RX ADMIN — SODIUM CHLORIDE, PRESERVATIVE FREE 10 ML: 5 INJECTION INTRAVENOUS at 20:58

## 2019-09-05 RX ADMIN — FLUTICASONE PROPIONATE 2 SPRAY: 50 SPRAY, METERED NASAL at 09:00

## 2019-09-05 RX ADMIN — TIZANIDINE 4 MG: 4 TABLET ORAL at 09:03

## 2019-09-05 RX ADMIN — DICLOFENAC SODIUM 4 G: 10 GEL TOPICAL at 12:07

## 2019-09-05 RX ADMIN — HYDROCODONE BITARTRATE AND ACETAMINOPHEN 1 TABLET: 10; 325 TABLET ORAL at 20:34

## 2019-09-05 RX ADMIN — LOSARTAN POTASSIUM 100 MG: 50 TABLET ORAL at 08:43

## 2019-09-05 RX ADMIN — DOXYCYCLINE 100 MG: 100 CAPSULE ORAL at 20:35

## 2019-09-05 RX ADMIN — BUSPIRONE HYDROCHLORIDE 5 MG: 5 TABLET ORAL at 05:43

## 2019-09-05 RX ADMIN — GABAPENTIN 800 MG: 400 CAPSULE ORAL at 20:39

## 2019-09-05 RX ADMIN — POTASSIUM CHLORIDE 20 MEQ: 750 CAPSULE, EXTENDED RELEASE ORAL at 08:38

## 2019-09-05 RX ADMIN — HEPARIN SODIUM 5000 UNITS: 5000 INJECTION INTRAVENOUS; SUBCUTANEOUS at 20:34

## 2019-09-05 RX ADMIN — HEPARIN SODIUM 5000 UNITS: 5000 INJECTION INTRAVENOUS; SUBCUTANEOUS at 15:15

## 2019-09-05 RX ADMIN — MELATONIN TAB 5 MG 5 MG: 5 TAB at 20:56

## 2019-09-05 RX ADMIN — BUSPIRONE HYDROCHLORIDE 5 MG: 5 TABLET ORAL at 15:14

## 2019-09-05 RX ADMIN — DICLOFENAC SODIUM 4 G: 10 GEL TOPICAL at 08:37

## 2019-09-05 RX ADMIN — ATORVASTATIN CALCIUM 20 MG: 20 TABLET, FILM COATED ORAL at 08:39

## 2019-09-05 RX ADMIN — IPRATROPIUM BROMIDE AND ALBUTEROL SULFATE 3 ML: 2.5; .5 SOLUTION RESPIRATORY (INHALATION) at 12:29

## 2019-09-05 RX ADMIN — CETIRIZINE HYDROCHLORIDE 10 MG: 10 TABLET, FILM COATED ORAL at 08:40

## 2019-09-05 RX ADMIN — POTASSIUM CHLORIDE 20 MEQ: 750 CAPSULE, EXTENDED RELEASE ORAL at 18:54

## 2019-09-05 RX ADMIN — IPRATROPIUM BROMIDE AND ALBUTEROL SULFATE 3 ML: 2.5; .5 SOLUTION RESPIRATORY (INHALATION) at 00:27

## 2019-09-05 RX ADMIN — DOXYCYCLINE 100 MG: 100 CAPSULE ORAL at 12:02

## 2019-09-05 RX ADMIN — PANTOPRAZOLE SODIUM 40 MG: 40 TABLET, DELAYED RELEASE ORAL at 19:03

## 2019-09-05 RX ADMIN — NICOTINE 1 PATCH: 21 PATCH, EXTENDED RELEASE TRANSDERMAL at 18:55

## 2019-09-05 RX ADMIN — METHYLPREDNISOLONE SODIUM SUCCINATE 40 MG: 40 INJECTION, POWDER, FOR SOLUTION INTRAMUSCULAR; INTRAVENOUS at 05:40

## 2019-09-05 RX ADMIN — HYDROCODONE BITARTRATE AND ACETAMINOPHEN 1 TABLET: 10; 325 TABLET ORAL at 09:03

## 2019-09-05 RX ADMIN — PANTOPRAZOLE SODIUM 40 MG: 40 TABLET, DELAYED RELEASE ORAL at 05:41

## 2019-09-05 RX ADMIN — QUETIAPINE FUMARATE 50 MG: 25 TABLET ORAL at 20:34

## 2019-09-05 RX ADMIN — DOCUSATE SODIUM 100 MG: 100 CAPSULE, LIQUID FILLED ORAL at 08:40

## 2019-09-05 RX ADMIN — POLYETHYLENE GLYCOL 3350 17 G: 17 POWDER, FOR SOLUTION ORAL at 12:02

## 2019-09-05 RX ADMIN — BUDESONIDE 0.5 MG: 0.5 INHALANT RESPIRATORY (INHALATION) at 19:25

## 2019-09-05 RX ADMIN — TIZANIDINE 4 MG: 4 TABLET ORAL at 20:56

## 2019-09-05 RX ADMIN — BUMETANIDE 1 MG: 1 TABLET ORAL at 08:43

## 2019-09-05 RX ADMIN — IPRATROPIUM BROMIDE AND ALBUTEROL SULFATE 3 ML: 2.5; .5 SOLUTION RESPIRATORY (INHALATION) at 07:27

## 2019-09-05 RX ADMIN — DOCUSATE SODIUM 100 MG: 100 CAPSULE, LIQUID FILLED ORAL at 20:35

## 2019-09-05 RX ADMIN — BUSPIRONE HYDROCHLORIDE 5 MG: 5 TABLET ORAL at 23:20

## 2019-09-05 RX ADMIN — TRAZODONE HYDROCHLORIDE 50 MG: 50 TABLET ORAL at 20:34

## 2019-09-05 RX ADMIN — DICLOFENAC SODIUM 4 G: 10 GEL TOPICAL at 20:33

## 2019-09-05 RX ADMIN — METHYLPREDNISOLONE SODIUM SUCCINATE 40 MG: 40 INJECTION, POWDER, FOR SOLUTION INTRAMUSCULAR; INTRAVENOUS at 18:54

## 2019-09-05 RX ADMIN — Medication 250 MG: at 20:35

## 2019-09-05 RX ADMIN — CALCIUM CARBONATE 2 TABLET: 500 TABLET ORAL at 19:02

## 2019-09-05 NOTE — PROGRESS NOTES
Continued Stay Note  [facility]     Patient Name: Quyen Galvan  MRN: [id number]  Today's Date: 9/5/2019    Admit Date: 9/3/2019    Discharge Plan     Row Name 09/05/19 1441       Plan    Plan  Spoke with Mrs. Galvan regarding home oxygen. She states she is unsure if she wants home oxygen. She is aware she needs home oxygen.  She is not agreeable to having oxygen but she may agree to having home oxygen.  She does not have a preference for home oxygen company. Social work will assist in setting up home oxygen with [facility] on the day of discharge.    Patient/Family in Agreement with Plan  yes    Final Discharge Disposition Code  01 - home or self-care    Row Name 09/05/19 1218       Plan    Plan  Quyen Galvan lives in [place]. Her primary care provider is Akshat Dawson. She uses a nebulizer machine. Her insurance is Medicare. Her goal is to go home at discharge.  Case management will continue to follow.    Patient/Family in Agreement with Plan  yes    Final Discharge Disposition Code  01 - home or self-care    Row Name 09/05/19 1216       Plan    Plan  Quyen Galvan    Patient/Family in Agreement with Plan  yes    Final Discharge Disposition Code  01 - home or self-care        Discharge Codes    No documentation.             KAREN Haji

## 2019-09-05 NOTE — PLAN OF CARE
Problem: Patient Care Overview  Goal: Plan of Care Review  Outcome: Ongoing (interventions implemented as appropriate)   09/05/19 1014   Coping/Psychosocial   Plan of Care Reviewed With patient   Plan of Care Review   Progress improving   OTHER   Outcome Summary Pt ambulated 400ft with SBA demonstrating slow any and decreased step length. VC's for upright posture and increased speed. Pt ambulated on RA; O2 sats between 81-85% throughout ambulation. Continue to progress as appropriate.

## 2019-09-05 NOTE — PLAN OF CARE
Problem: Patient Care Overview  Goal: Plan of Care Review  Outcome: Ongoing (interventions implemented as appropriate)   09/05/19 0216   Coping/Psychosocial   Plan of Care Reviewed With patient   Plan of Care Review   Progress improving   OTHER   Outcome Summary Pt is up and walking more, she has walked several laps around the unit. Pt has been resting well this shift. VSS. Will continue to monitor. 0220 9-5-19

## 2019-09-05 NOTE — PROGRESS NOTES
[facility] Medicine Services  PROGRESS NOTE    Patient Name: Quyen Galvan  : 1955  MRN: [id number]    Date of Admission: 9/3/2019  Length of Stay: 2  Primary Care Physician: Akshat Dawson MD    Subjective   Subjective     CC:  Shortness of breath    HPI:  Lungs still feel tight with cough that is productive of clearish/gray sputum. Denies fever or chills. Dyspnea ongoing, similar to that experienced yesterday. Feels constipated    Review of Systems  Gen- No fevers, chills  CV- No chest pain, palpitations  Resp- + cough and dyspnea  GI- No N/V/D, feels constipated.    All other systems reviewed and negative except any additional pertinent positives and negatives discussed in HPI.         Objective   Objective     Vital Signs:   Temp:  [97.4 °F (36.3 °C)-97.9 °F (36.6 °C)] 97.8 °F (36.6 °C)  Heart Rate:  [66-81] 71  Resp:  [14-20] 16  BP: (136-153)/(68-76) 136/68        Physical Exam:    Constitutional - non toxic, in bed  HEENT-NCAT, mucous membranes moist  CV-RRR, S1 S2 normal, no m/r/g  Resp-diminished bilaterally with some scattered rhonchi in both lower lobes and rare expiratory wheezes  Abd-soft, non-tender, non-distended, normo active bowel sounds, overweight  Ext-No lower extremity cyanosis, clubbing or edema bilaterally  Neuro-alert and oriented, speech clear, moves all extremities   Psych-normal affect   Skin- No rash on exposed UE or LE bilaterally        Results Reviewed:    Results from last 7 days   Lab Units 19  0544 19  1336   WBC 10*3/mm3 10.37 14.91*   HEMOGLOBIN g/dL 11.1* 11.7*   HEMATOCRIT % 35.0 36.5   PLATELETS 10*3/mm3 279 279     Results from last 7 days   Lab Units 19  0544 19  1336   SODIUM mmol/L 143 140   POTASSIUM mmol/L 3.8 3.5   CHLORIDE mmol/L 96* 93*   CO2 mmol/L 35.0* 34.0*   BUN mg/dL 18 13   CREATININE mg/dL 0.98 1.00   GLUCOSE mg/dL 107* 134*   CALCIUM mg/dL 8.9 8.8   ALT (SGPT) U/L  --  11   AST (SGOT) U/L  --  11    TROPONIN T ng/mL  --  <0.010   PROBNP pg/mL  --  248.2     Estimated Creatinine Clearance: 63.9 mL/min (by C-G formula based on SCr of 0.98 mg/dL).    Microbiology Results Abnormal     None          Imaging Results (last 24 hours)     ** No results found for the last 24 hours. **               I have reviewed the medications:  Scheduled Meds:    atorvastatin 20 mg Oral Daily   budesonide 0.5 mg Nebulization BID - RT   bumetanide 1 mg Oral Daily   busPIRone 5 mg Oral Q8H   cetirizine 10 mg Oral Daily   diclofenac 4 g Topical 4x Daily   docusate sodium 100 mg Oral BID   doxycycline 100 mg Oral Q12H   FLUoxetine 20 mg Oral Daily   fluticasone 2 spray Nasal Daily   gabapentin 800 mg Oral Q8H   heparin (porcine) 5,000 Units Subcutaneous Q8H   insulin lispro 0-7 Units Subcutaneous 4x Daily With Meals & Nightly   ipratropium-albuterol 3 mL Nebulization Q6H - RT   losartan 100 mg Oral Daily   methylPREDNISolone sodium succinate 40 mg Intravenous Q12H   nicotine 1 patch Transdermal Q24H   pantoprazole 40 mg Oral QAM   pharmacy consult - MTM  Does not apply Daily   potassium chloride 20 mEq Oral BID With Meals   QUEtiapine 50 mg Oral Nightly   saccharomyces boulardii 250 mg Oral BID   sodium chloride 10 mL Intravenous Q12H     Continuous Infusions:   PRN Meds:.•  acetaminophen **OR** acetaminophen **OR** acetaminophen  •  albuterol  •  benzonatate  •  calcium carbonate  •  dextrose  •  dextrose  •  glucagon (human recombinant)  •  HYDROcodone-acetaminophen  •  hydrOXYzine  •  melatonin  •  ondansetron  •  sodium chloride  •  sodium chloride  •  tiZANidine  •  traZODone      Assessment/Plan   Assessment / Plan     Active Hospital Problems    Diagnosis  POA   • Acute on chronic respiratory failure with hypoxia and hypercapnia (CMS/HCC) [J96.21, J96.22]  Yes   • COPD with acute exacerbation (CMS/HCC) [J44.1]  Unknown   • Tobacco abuse [Z72.0]  Unknown   • Diarrhea [R19.7]  Unknown   • Essential hypertension [I10]  Unknown   •  Hyperglycemia [R73.9]  Unknown   • Arthritis [M19.90]  Unknown   • Leukocytosis [D72.829]  Unknown      Resolved Hospital Problems   No resolved problems to display.        Brief Hospital Course to date:  Quyen Galvan is a 63 y.o. female with history of COPD and ongoing tobacco abuse presents with shortness of breath.    COPD with AE  - continue solumedrol IV, slow progress  - add empiric doxy PO  - continue nebs scheduled, pulmicort  - check CXR (new rhonchi)  - send sputum culture  - incentive spirometry    Tobacco abuse  - reinforced need for cessation in setting of COPD    Constipation  - added stool softener today    Diarrhea  - resolved, no bm for 36 hours    Leukocytosis  - resolved, afebrile    Hyperglycemia  - a1c 6.3  - some element of steroid induced elevation of blood sugars    DVT Prophylaxis:  heparin    Disposition: I expect the patient to be discharged home in 1-2 days.    CODE STATUS:   Code Status and Medical Interventions:   Ordered at: 09/03/19 1481     Level Of Support Discussed With:    Patient     Code Status:    CPR     Medical Interventions (Level of Support Prior to Arrest):    Full         Electronically signed by Lenny Vieyra MD, 09/05/19, 9:32 AM.

## 2019-09-05 NOTE — PROGRESS NOTES
Discharge Planning Assessment  [facility]     Patient Name: Quyen Galvan  MRN: [id number]  Today's Date: 9/5/2019    Admit Date: 9/3/2019    Discharge Needs Assessment     Row Name 09/05/19 1217       Living Environment    Current Living Arrangements  home/apartment/condo    Primary Care Provided by  self    Provides Primary Care For  no one    Family Caregiver if Needed  significant other    Quality of Family Relationships  supportive       Transition Planning    Patient/Family Anticipates Transition to  home with family    Patient/Family Anticipated Services at Transition      Transportation Anticipated  family or friend will provide       Discharge Needs Assessment    Readmission Within the Last 30 Days  no previous admission in last 30 days    Concerns to be Addressed  discharge planning    Equipment Currently Used at Home  nebulizer    Anticipated Changes Related to Illness  none    Equipment Needed After Discharge  nebulizer    Row Name 09/05/19 1215       Living Environment    Lives With  significant other    Current Living Arrangements  home/apartment/condo    Primary Care Provided by  [facility]    Quality of Family Relationships  supportive       Transition Planning    Patient/Family Anticipates Transition to  home with family    Patient/Family Anticipated Services at Transition      Transportation Anticipated  family or friend will provide       Discharge Needs Assessment    Readmission Within the Last 30 Days  no previous admission in last 30 days    Concerns to be Addressed  discharge planning    Equipment Currently Used at Home  nebulizer    Anticipated Changes Related to Illness  none    Equipment Needed After Discharge  nebulizer        Discharge Plan     Row Name 09/05/19 1218       Plan    Plan  Quyen Galvan lives in [place]. Her primary care provider is Akshat Dawson. She uses a nebulizer machine. Her insurance is Medicare. Her goal is to go home at discharge.  Case  management will continue to follow.    Patient/Family in Agreement with Plan  yes    Final Discharge Disposition Code  01 - home or self-care    Row Name 09/05/19 1216       Plan    Plan  Quyen Galvan    Patient/Family in Agreement with Plan  yes    Final Discharge Disposition Code  01 - home or self-care        Destination      No service coordination in this encounter.      Durable Medical Equipment      No service coordination in this encounter.      Dialysis/Infusion      No service coordination in this encounter.      Home Medical Care      No service coordination in this encounter.      Therapy      No service coordination in this encounter.      Community Resources      No service coordination in this encounter.          Demographic Summary     Row Name 09/05/19 1215       General Information    Admission Type  inpatient    Arrived From  home    Referral Source  patient    Reason for Consult  discharge planning    Preferred Language  English       Contact Information    Permission Granted to Share Info With      Contact Information Obtained for          Functional Status     Row Name 09/05/19 1215       Functional Status    Usual Activity Tolerance  good    Current Activity Tolerance  good       Functional Status, IADL    Medications  independent    Meal Preparation  independent    Housekeeping  independent    Laundry  independent    Shopping  independent       Mental Status Summary    Recent Changes in Mental Status/Cognitive Functioning  no changes        Psychosocial    No documentation.       Abuse/Neglect    No documentation.       Legal    No documentation.       Substance Abuse    No documentation.       Patient Forms    No documentation.           KAREN Haji

## 2019-09-05 NOTE — CONSULTS
COPD NN spoke with pt at BS.  Pt alert and able to answer questions appropriately.  O2 sat 93% on 2L NC.  IS demonstrated, 1000 mL X 3 reps.  Technique required correction.  COPD action plan reiterated.  Need for home O2 discussed, pt states that she just doesn't want a concentrator, prefers portable O2.  Smoking cessation importance reiterated as well.  Concerns relayed to primary RN, Vanna.  No other needs or concerns voiced at this time.  NN will continue to follow.

## 2019-09-06 VITALS
WEIGHT: 219 LBS | BODY MASS INDEX: 40.3 KG/M2 | OXYGEN SATURATION: 89 % | RESPIRATION RATE: 14 BRPM | HEIGHT: 62 IN | TEMPERATURE: 98 F | DIASTOLIC BLOOD PRESSURE: 67 MMHG | HEART RATE: 86 BPM | SYSTOLIC BLOOD PRESSURE: 121 MMHG

## 2019-09-06 PROBLEM — J96.22 ACUTE ON CHRONIC RESPIRATORY FAILURE WITH HYPOXIA AND HYPERCAPNIA (HCC): Status: RESOLVED | Noted: 2019-09-03 | Resolved: 2019-09-06

## 2019-09-06 PROBLEM — D72.829 LEUKOCYTOSIS: Status: RESOLVED | Noted: 2019-09-03 | Resolved: 2019-09-06

## 2019-09-06 PROBLEM — J96.21 ACUTE ON CHRONIC RESPIRATORY FAILURE WITH HYPOXIA AND HYPERCAPNIA: Status: RESOLVED | Noted: 2019-09-03 | Resolved: 2019-09-06

## 2019-09-06 PROBLEM — E66.01 OBESITY, CLASS III, BMI 40-49.9 (MORBID OBESITY): Status: ACTIVE | Noted: 2019-09-06

## 2019-09-06 PROBLEM — R19.7 DIARRHEA: Status: RESOLVED | Noted: 2019-09-03 | Resolved: 2019-09-06

## 2019-09-06 LAB
GLUCOSE BLDC GLUCOMTR-MCNC: 120 MG/DL (ref 70–130)
GLUCOSE BLDC GLUCOMTR-MCNC: 188 MG/DL (ref 70–130)

## 2019-09-06 PROCEDURE — 94799 UNLISTED PULMONARY SVC/PX: CPT

## 2019-09-06 PROCEDURE — 82962 GLUCOSE BLOOD TEST: CPT

## 2019-09-06 PROCEDURE — 99239 HOSP IP/OBS DSCHRG MGMT >30: CPT | Performed by: INTERNAL MEDICINE

## 2019-09-06 PROCEDURE — 25010000002 HEPARIN (PORCINE) PER 1000 UNITS: Performed by: NURSE PRACTITIONER

## 2019-09-06 PROCEDURE — 25010000002 METHYLPREDNISOLONE PER 40 MG: Performed by: INTERNAL MEDICINE

## 2019-09-06 RX ORDER — NICOTINE 21 MG/24HR
1 PATCH, TRANSDERMAL 24 HOURS TRANSDERMAL EVERY 24 HOURS
Qty: 28 PATCH | Refills: 0 | Status: SHIPPED | OUTPATIENT
Start: 2019-09-06 | End: 2020-07-27

## 2019-09-06 RX ORDER — DOXYCYCLINE 100 MG/1
100 CAPSULE ORAL EVERY 12 HOURS SCHEDULED
Qty: 7 CAPSULE | Refills: 0 | Status: SHIPPED | OUTPATIENT
Start: 2019-09-06 | End: 2019-09-10

## 2019-09-06 RX ORDER — PREDNISONE 10 MG/1
TABLET ORAL
Qty: 20 TABLET | Refills: 0 | Status: SHIPPED | OUTPATIENT
Start: 2019-09-06 | End: 2019-09-14

## 2019-09-06 RX ORDER — IPRATROPIUM BROMIDE AND ALBUTEROL SULFATE 2.5; .5 MG/3ML; MG/3ML
3 SOLUTION RESPIRATORY (INHALATION)
Qty: 360 ML | Refills: 0 | Status: SHIPPED | OUTPATIENT
Start: 2019-09-06 | End: 2022-08-08

## 2019-09-06 RX ADMIN — HEPARIN SODIUM 5000 UNITS: 5000 INJECTION INTRAVENOUS; SUBCUTANEOUS at 13:56

## 2019-09-06 RX ADMIN — BUMETANIDE 1 MG: 1 TABLET ORAL at 08:23

## 2019-09-06 RX ADMIN — HYDROCODONE BITARTRATE AND ACETAMINOPHEN 1 TABLET: 10; 325 TABLET ORAL at 08:23

## 2019-09-06 RX ADMIN — Medication 250 MG: at 08:13

## 2019-09-06 RX ADMIN — GABAPENTIN 800 MG: 400 CAPSULE ORAL at 13:56

## 2019-09-06 RX ADMIN — IPRATROPIUM BROMIDE AND ALBUTEROL SULFATE 3 ML: 2.5; .5 SOLUTION RESPIRATORY (INHALATION) at 12:56

## 2019-09-06 RX ADMIN — IPRATROPIUM BROMIDE AND ALBUTEROL SULFATE 3 ML: 2.5; .5 SOLUTION RESPIRATORY (INHALATION) at 07:28

## 2019-09-06 RX ADMIN — FLUTICASONE PROPIONATE 2 SPRAY: 50 SPRAY, METERED NASAL at 08:13

## 2019-09-06 RX ADMIN — DICLOFENAC SODIUM 4 G: 10 GEL TOPICAL at 08:13

## 2019-09-06 RX ADMIN — CETIRIZINE HYDROCHLORIDE 10 MG: 10 TABLET, FILM COATED ORAL at 08:14

## 2019-09-06 RX ADMIN — PANTOPRAZOLE SODIUM 40 MG: 40 TABLET, DELAYED RELEASE ORAL at 08:13

## 2019-09-06 RX ADMIN — GABAPENTIN 800 MG: 400 CAPSULE ORAL at 06:11

## 2019-09-06 RX ADMIN — DOCUSATE SODIUM 100 MG: 100 CAPSULE, LIQUID FILLED ORAL at 08:14

## 2019-09-06 RX ADMIN — FLUOXETINE HYDROCHLORIDE 20 MG: 20 CAPSULE ORAL at 13:00

## 2019-09-06 RX ADMIN — BUSPIRONE HYDROCHLORIDE 5 MG: 5 TABLET ORAL at 08:13

## 2019-09-06 RX ADMIN — ATORVASTATIN CALCIUM 20 MG: 20 TABLET, FILM COATED ORAL at 08:13

## 2019-09-06 RX ADMIN — PANTOPRAZOLE SODIUM 40 MG: 40 TABLET, DELAYED RELEASE ORAL at 13:56

## 2019-09-06 RX ADMIN — HEPARIN SODIUM 5000 UNITS: 5000 INJECTION INTRAVENOUS; SUBCUTANEOUS at 06:10

## 2019-09-06 RX ADMIN — DICLOFENAC SODIUM 4 G: 10 GEL TOPICAL at 12:58

## 2019-09-06 RX ADMIN — POLYETHYLENE GLYCOL 3350 17 G: 17 POWDER, FOR SOLUTION ORAL at 08:12

## 2019-09-06 RX ADMIN — TIZANIDINE 4 MG: 4 TABLET ORAL at 08:22

## 2019-09-06 RX ADMIN — BUDESONIDE 0.5 MG: 0.5 INHALANT RESPIRATORY (INHALATION) at 07:28

## 2019-09-06 RX ADMIN — SODIUM CHLORIDE, PRESERVATIVE FREE 10 ML: 5 INJECTION INTRAVENOUS at 08:25

## 2019-09-06 RX ADMIN — LOSARTAN POTASSIUM 100 MG: 50 TABLET ORAL at 08:13

## 2019-09-06 RX ADMIN — POTASSIUM CHLORIDE 20 MEQ: 750 CAPSULE, EXTENDED RELEASE ORAL at 08:13

## 2019-09-06 RX ADMIN — DOXYCYCLINE 100 MG: 100 CAPSULE ORAL at 08:13

## 2019-09-06 RX ADMIN — INSULIN LISPRO 2 UNITS: 100 INJECTION, SOLUTION INTRAVENOUS; SUBCUTANEOUS at 12:58

## 2019-09-06 RX ADMIN — METHYLPREDNISOLONE SODIUM SUCCINATE 40 MG: 40 INJECTION, POWDER, FOR SOLUTION INTRAMUSCULAR; INTRAVENOUS at 06:11

## 2019-09-06 NOTE — PROGRESS NOTES
Continued Stay Note  [facility]     Patient Name: Quyen Galvan  MRN: [id number]  Today's Date: 9/6/2019    Admit Date: 9/3/2019    Discharge Plan     Row Name 09/06/19 0837       Plan    Plan  Social work spoke with the patient and she was agreeable to having [employer] for home oxygen. [facility]. They will deliver oxygen to the patients room.    Patient/Family in Agreement with Plan  yes        Discharge Codes    No documentation.             KAREN Haji

## 2019-09-06 NOTE — CONSULTS
COPD spoke with pt at .  Pt alert and able to answer questions appropriately.  COPD action plan and smoking cessation reiterated.  Pt had previously expressed concern that her boyfriend would not go outside to smoke which was hindering her ability to comply with initiation of oxygen therapy at home.  Pt states this morning that she had begun talks with him and he has agreed that he will smoke outside.  No new questions or concerns voiced at this time.  NN will continue to follow.

## 2019-09-06 NOTE — PLAN OF CARE
Problem: Patient Care Overview  Goal: Plan of Care Review  Outcome: Ongoing (interventions implemented as appropriate)   09/06/19 0631   Coping/Psychosocial   Plan of Care Reviewed With patient   Plan of Care Review   Progress improving   OTHER   Outcome Summary Pt VSS. 2L NC w/ O2 at 95%, Course lung sounds. Walked in halls. Ambulated independently. Genernal. pain from fibromya. well controlled with po meds. Cont to monitor.

## 2019-09-06 NOTE — PROGRESS NOTES
Case Management Discharge Note    Final Note: The patient will be going home at discharge and [facility] They will be delivering home oxygen to her room today before she is discharged.    Destination      No service has been selected for the patient.      Durable Medical Equipment - Selection Complete      Service Provider Request Status Selected Services Address Phone Number Fax Number    [facility] [place] Selected Durable Medical Equipment [address] [phone] [phone]      Dialysis/Infusion      No service has been selected for the patient.      Home Medical Care      No service has been selected for the patient.      Therapy      No service has been selected for the patient.      Community Resources      No service has been selected for the patient.             Final Discharge Disposition Code: 01 - home or self-care

## 2019-09-06 NOTE — DISCHARGE SUMMARY
[facility] Medicine Services  DISCHARGE SUMMARY    Patient Name: Quyen Galvan  : 1955  MRN: [id number]    Date of Admission: 9/3/2019  Date of Discharge:  19  Primary Care Physician: Akshat Dawson MD    Consults     No orders found from 2019 to 2019.          Hospital Course     Presenting Problem:   Acute on chronic respiratory failure with hypoxia and hypercapnia (CMS/HCC) [J96.21, J96.22]    Active Hospital Problems    Diagnosis  POA   • COPD with acute exacerbation (CMS/HCC) [J44.1]  Yes   • Tobacco abuse [Z72.0]  Yes   • Essential hypertension [I10]  Yes   • Hyperglycemia [R73.9]  Yes   • Arthritis [M19.90]  Yes      Resolved Hospital Problems    Diagnosis Date Resolved POA   • Acute on chronic respiratory failure with hypoxia and hypercapnia (CMS/HCC) [J96.21, J96.22] 2019 Yes   • Diarrhea [R19.7] 2019 Yes   • Leukocytosis [D72.829] 2019 Yes          Hospital Course:  Quyen Galvan is a 63 y.o. female with past medical history significant for hypertension, hyperlipidemia, chronic pain/fibromyalgia/arthritis, COPD and ongoing tobacco use presents for shortness of air and productive cough.  Patient states she has been feeling unwell for approximately 1 month and was [facility] to [facility] for [facility] days.  She was discharged with prednisone and Z-Bernardo.  She was offered oxygen however refused and has not stopped smoking while at home post discharge.  She states she completed her last dose of prednisone earlier today and after completing Z-Bernardo she presented to primary care provider's office for ongoing cough, chest congestion, shortness of breath who started her on doxycycline.    COPD with AE  - some improvement while hospitalized with IV steroids and empiric antibiotics  - patient endorses improvement in breathing and desires discharge home - will send with prednisone taper and doxycycline to complete a  full course.  - ongoing smoking likely the biggest contributor to her recurrent symptoms. Strongly encouraged cessation both for her and her significant other. Will provide nicotine patches at discharge  - oxygen sats 82% while walking on room air. Recommend home oxygen use, patient somewhat reluctant as she says she doesn't have room in the house for a home concentrator. Explained that this will only be the size of a chair and should fit into her house easily. Patient will take portable oxygen tanks home (she knows these will only provide several hours of oxygen) and agrees to meet with the oxygen company rep this evening to view the concentrator and discuss home placement. Informed patient that there can be absolutely no smoking with oxygen use due to fire risk.  - refilled duonebs prior to discharge  - follow up PCP next week. If continued symptoms, consider Pulmonary Referral. Reflux could also contribute in addition to tobacco abuse.   - body habitus and morbid obesity may all affect breathing a well, consider element of SASHA or OHS - however weight loss may be difficult. Consider referral for sleep study as well.      Discharge Follow Up Recommendations for labs/diagnostics:      Day of Discharge     HPI:   Breathing and dyspnea improved compared to admission. Still coughing, with clearish to gray sputum. No fever or chills.     Review of Systems  Gen- No fevers, chills  CV- No chest pain, palpitations  Resp- as above  GI- No N/V/D, abd pain    All other systems reviewed and negative except any additional pertinent positives and negatives discussed in HPI.       Otherwise ROS is negative except as mentioned in the HPI.    Vital Signs:   Temp:  [97.3 °F (36.3 °C)-98 °F (36.7 °C)] 98 °F (36.7 °C)  Heart Rate:  [57-94] 86  Resp:  [14-18] 14  BP: (121-144)/(67-82) 121/67     Physical Exam:  Constitutional -no acute distress, non toxic, in bed  HEENT-NCAT, mucous membranes moist  CV-RRR, S1 S2 normal, no  m/r/g  Resp-somewhat diminished bilaterally, but improved air movement compared to yesterday. No wheezes, rhonchi or rales  Abd-soft, non-tender, non-distended, normo active bowel sounds, obese  Ext-No lower extremity cyanosis, clubbing or edema bilaterally  Neuro-alert and oriented, speech clear, moves all extremities   Psych-normal affect   Skin- No rash on exposed UE or LE bilaterally      Pertinent  and/or Most Recent Results     Results from last 7 days   Lab Units 09/04/19  0544 09/03/19  1336   WBC 10*3/mm3 10.37 14.91*   HEMOGLOBIN g/dL 11.1* 11.7*   HEMATOCRIT % 35.0 36.5   PLATELETS 10*3/mm3 279 279   SODIUM mmol/L 143 140   POTASSIUM mmol/L 3.8 3.5   CHLORIDE mmol/L 96* 93*   CO2 mmol/L 35.0* 34.0*   BUN mg/dL 18 13   CREATININE mg/dL 0.98 1.00   GLUCOSE mg/dL 107* 134*   CALCIUM mg/dL 8.9 8.8     Results from last 7 days   Lab Units 09/03/19  1336   BILIRUBIN mg/dL 0.4   ALK PHOS U/L 103   ALT (SGPT) U/L 11   AST (SGOT) U/L 11           Invalid input(s): TG, LDLCALC, LDLREALC  Results from last 7 days   Lab Units 09/04/19  0544 09/03/19  1336   HEMOGLOBIN A1C % 6.30*  --    PROBNP pg/mL  --  248.2   TROPONIN T ng/mL  --  <0.010       Brief Urine Lab Results     None          Microbiology Results Abnormal     None          Imaging Results (all)     Procedure Component Value Units Date/Time    XR Chest 1 View [[id number]] Collected:  09/03/19 1406     Updated:  09/06/19 1210    Narrative:       EXAMINATION: XR CHEST 1 VW-09/03/2019:      INDICATION: SOA, triage protocol.      COMPARISON: Chest radiograph 06/14/2016.     FINDINGS: Single portable chest radiograph submitted for review. The  heart is nonenlarged. The lungs are clear without evidence for  superimposed pneumonia. No pleural effusion or pneumothorax. The  visualized upper abdomen is unrevealing. No acute osseous injury  identified. Calcified granulomas noted within the medial left lower  lung, unchanged from the 2016 exam.           Impression:        No acute cardiopulmonary process.     D:  09/03/2019  E:  09/03/2019     This report was finalized on 9/6/2019 12:07 PM by Dr. Smooth Martin MD.       XR Chest 1 View [[id number]] Collected:  09/05/19 1020     Updated:  09/05/19 1207    Narrative:       EXAMINATION: XR CHEST 1 VW-09/05/2019:      INDICATION: COPD exacerbation, rhonchi on exam; J44.1-Chronic  obstructive pulmonary disease with (acute) exacerbation; J96.21-Acute  and chronic respiratory failure with hypoxia.      COMPARISON: NONE.     FINDINGS: Portable chest reveals the heart to be borderline enlarged.  The lung fields are grossly clear. Degenerative changes seen within the  spine. No pleural effusion or pneumothorax. No focal parenchymal  opacification present.           Impression:       Chronic changes seen within the lung fields with no evidence  of acute parenchymal disease.     D:  09/05/2019  E:  09/05/2019     This report was finalized on 9/5/2019 12:04 PM by Dr. Jenny Alejandro MD.             Results for orders placed during the hospital encounter of 06/14/16   Duplex venous lower extremity bilateral    Narrative · Normal bilateral lower extremity venous duplex scan.          Results for orders placed during the hospital encounter of 06/14/16   Duplex venous lower extremity bilateral    Narrative · Normal bilateral lower extremity venous duplex scan.                 Discharge Details        Discharge Medications      New Medications      Instructions Start Date   doxycycline 100 MG capsule  Commonly known as:  MONODOX   100 mg, Oral, Every 12 Hours Scheduled      ipratropium-albuterol 0.5-2.5 mg/3 ml nebulizer  Commonly known as:  DUO-NEB   3 mL, Nebulization, 4 Times Daily - RT      predniSONE 10 MG tablet  Commonly known as:  DELTASONE   Take 4 tabs by mouth Daily for 2 days,3 tablets Daily for 2 days, THEN 2 tablets Daily for 2 days, THEN 1 tablet Daily for 2 days.Then stop   Start Date:  9/6/2019        Continue These  Medications      Instructions Start Date   albuterol sulfate  (90 Base) MCG/ACT inhaler  Commonly known as:  PROVENTIL HFA;VENTOLIN HFA;PROAIR HFA   2 puffs, Inhalation, Every 4 Hours PRN      atorvastatin 20 MG tablet  Commonly known as:  LIPITOR   20 mg, Oral, Daily      budesonide-formoterol 160-4.5 MCG/ACT inhaler  Commonly known as:  SYMBICORT   2 puffs, Inhalation, 2 Times Daily - RT      bumetanide 1 MG tablet  Commonly known as:  BUMEX   1 mg, Oral, 2 Times Daily      busPIRone 5 MG tablet  Commonly known as:  BUSPAR   10 mg, Oral, 2 Times Daily      CALCIUM 1200 PO   1,200 mg, Oral, 2 Times Daily      diclofenac 1 % gel gel  Commonly known as:  VOLTAREN   4 g, Topical, 4 Times Daily PRN      diclofenac 50 MG EC tablet  Commonly known as:  VOLTAREN   50 mg, Oral, 2 Times Daily      docusate sodium 250 MG capsule  Commonly known as:  COLACE   250 mg, Oral, 2 Times Daily      fexofenadine 180 MG tablet  Commonly known as:  ALLEGRA   180 mg, Oral, Daily      FLUoxetine 20 MG capsule  Commonly known as:  PROzac   20 mg, Oral, Daily      fluticasone 50 MCG/ACT nasal spray  Commonly known as:  FLONASE   2 sprays, Nasal, Daily, Administer 2 sprays in each nostril for each dose.      gabapentin 800 MG tablet  Commonly known as:  NEURONTIN   800 mg, Oral, 3 Times Daily      HYDROcodone-acetaminophen  MG per tablet  Commonly known as:  NORCO   1 tablet, Oral, Every 6 Hours      hydrOXYzine 25 MG tablet  Commonly known as:  ATARAX   25 mg, Oral, 4 Times Daily      losartan 100 MG tablet  Commonly known as:  COZAAR   100 mg, Oral, Daily      Melatonin 10 MG tablet   40 mg, Oral, Nightly, 4 tabs (40mg total) nightly      misoprostol 200 MCG tablet  Commonly known as:  CYTOTEC   200 mcg, Oral, 2 Times Daily      nicotine 21 MG/24HR patch  Commonly known as:  NICODERM CQ   1 patch, Transdermal, Every 24 Hours      nitroglycerin 0.4 MG SL tablet  Commonly known as:  NITROSTAT   0.4 mg, Sublingual, Every 5  Minutes PRN, Take no more than 3 doses in 15 minutes.       nystatin [id number] UNIT/GM cream  Commonly known as:  MYCOSTATIN   Topical, As Needed      nystatin [id number] UNIT/GM powder  Commonly known as:  MYCOSTATIN   Topical, As Needed      omeprazole 40 MG capsule  Commonly known as:  priLOSEC   40 mg, Oral, 2 Times Daily      potassium chloride 20 MEQ CR tablet  Commonly known as:  K-DUR,KLOR-CON   20 mEq, Oral, Daily      Probiotic capsule   1 capsule, Oral, Daily      promethazine-dextromethorphan 6.25-15 MG/5ML syrup  Commonly known as:  PROMETHAZINE-DM   5 mL, Oral, As Needed      QUEtiapine 50 MG tablet  Commonly known as:  SEROquel   50 mg, Oral, Nightly      tiZANidine 4 MG tablet  Commonly known as:  ZANAFLEX   4 mg, Oral, 3 Times Daily      triamcinolone 0.1 % ointment  Commonly known as:  KENALOG   Topical, As Needed      vitamin B-12 2500 MCG sublingual tablet tablet  Commonly known as:  CYANOCOBALAMIN   Sublingual, Daily      vitamin C 500 MG tablet  Commonly known as:  ASCORBIC ACID   1,000 mg, Oral, Daily      Vitamin D3 2000 units tablet   2,000 Units, Oral, Daily      VITAMIN E PO   1 capsule, Oral, Daily             No Known Allergies      Discharge Disposition:  Home or Self Care    Diet:  Hospital:  Diet Order   Procedures   • Diet Regular; Cardiac     Discharge:     Discharge Activity:         CODE STATUS:    Code Status and Medical Interventions:   Ordered at: 09/03/19 3558     Level Of Support Discussed With:    Patient     Code Status:    CPR     Medical Interventions (Level of Support Prior to Arrest):    Full         No future appointments.        Time Spent on Discharge:  40 minutes    Electronically signed by Lenny Vieyra MD, 09/06/19, 3:45 PM.

## 2019-09-07 ENCOUNTER — READMISSION MANAGEMENT (OUTPATIENT)
Dept: CALL CENTER | Facility: HOSPITAL | Age: 64
End: 2019-09-07

## 2019-09-07 NOTE — OUTREACH NOTE
Prep Survey      Responses   Facility patient discharged from?  [facility]   Is patient eligible?  Yes   Discharge diagnosis  A/C resp. failure with hypoxia and hypercapnia, COPD with AE, tobacco abuse, essential HTN, Hyperglycemia, arthritis   Does the patient have one of the following disease processes/diagnoses(primary or secondary)?  COPD/Pneumonia   Does the patient have Home health ordered?  No   Is there a DME ordered?  Yes   What DME was ordered?  [employer] for home O2   Comments regarding appointments  Pt to schedule follow up with PCP   Prep survey completed?  Yes          Laina Emmanuel RN

## 2019-09-08 ENCOUNTER — READMISSION MANAGEMENT (OUTPATIENT)
Dept: CALL CENTER | Facility: HOSPITAL | Age: 64
End: 2019-09-08

## 2019-09-08 NOTE — OUTREACH NOTE
COPD/PN Week 1 Survey      Responses   Facility patient discharged from?  [facility]   Does the patient have one of the following disease processes/diagnoses(primary or secondary)?  COPD/Pneumonia   Is there a successful TCM telephone encounter documented?  No   Was the primary reason for admission:  COPD exacerbation   Week 1 attempt successful?  No   Unsuccessful attempts  Attempt 1          Malia Villela, RN

## 2019-09-09 ENCOUNTER — READMISSION MANAGEMENT (OUTPATIENT)
Dept: CALL CENTER | Facility: HOSPITAL | Age: 64
End: 2019-09-09

## 2019-09-09 NOTE — OUTREACH NOTE
COPD/PN Week 1 Survey      Responses   Facility patient discharged from?  [place]   Does the patient have one of the following disease processes/diagnoses(primary or secondary)?  COPD/Pneumonia   Is there a successful TCM telephone encounter documented?  No   Was the primary reason for admission:  COPD exacerbation   Week 1 attempt successful?  No   Unsuccessful attempts  Attempt 2          Mandy Shelley RN

## 2019-09-16 ENCOUNTER — READMISSION MANAGEMENT (OUTPATIENT)
Dept: CALL CENTER | Facility: HOSPITAL | Age: 64
End: 2019-09-16

## 2019-09-16 NOTE — OUTREACH NOTE
COPD/PN Week 2 Survey      Responses   Facility patient discharged from?  [place]   Does the patient have one of the following disease processes/diagnoses(primary or secondary)?  COPD/Pneumonia   Was the primary reason for admission:  COPD exacerbation   Week 2 attempt successful?  No   Unsuccessful attempts  Attempt 1          Laina Sawant RN

## 2019-09-18 ENCOUNTER — READMISSION MANAGEMENT (OUTPATIENT)
Dept: CALL CENTER | Facility: HOSPITAL | Age: 64
End: 2019-09-18

## 2019-09-18 NOTE — OUTREACH NOTE
COPD/PN Week 2 Survey      Responses   Facility patient discharged from?  [place]   Does the patient have one of the following disease processes/diagnoses(primary or secondary)?  COPD/Pneumonia   Was the primary reason for admission:  COPD exacerbation   Week 2 attempt successful?  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    Patient is stating that she started to experience a severe occipital headache (worse headache of her life) around noon on 12/7 immediately after she had vomited. Patient was immediately taken to [facility] by EMS, where BP ~140/90. On CTH hemorrhage in R sylvian fissure and frontal sulcus was noted, as well as a hyperdense focus at distal R ICA suggestive of aneurysm. CTA was obtained which further approved presence of multiple aneurysms, largest of which was located at distal R ICA. Patient was transferred to [facility] for closer monitoring and IR intervention.   Currently she is distressed due to severe pain in neck and back of the head, denies any focal weakness, numbness, or change in vision. Patient had been dealing with URI and coughing for the past 1 week, denies any trauma to the head, or any history of intracranial hemorrhage or aneurysms in the family (father had an ischemic stroke).     Hospital Course: 12/9: daily TCDs, CXR, ADAT, cough Supressant, OOB with PT OT   12/10: methylprednisolone for ha  12/12-increased UOP with sodium trending down- fludrocortisone started. TCDs with elevated velocity of R MCA, asymptomatic .     Interval History: increased UOP, fludrocortisone started. Exam unchanged.     Review of Systems: Review of Symptoms:  Constitutional: Denies fevers, weight loss, chills, or weakness.  Eyes: Denies changes in vision.  ENT: Denies dysphagia, nasal discharge, ear pain or discharge.  Cardiovascular: Denies chest pain, palpitations, orthopnea, or  claudication.  Respiratory: Denies shortness of breath, cough, hemoptysis, or wheezing.  GI: Denies nausea/vomitting, hematochezia, melena, abd pain, or changes in appetite.  : Denies dysuria, incontinence, or hematuria.  Musculoskeletal: Denies joint pain or myalgias.  Skin/breast: Denies rashes, lumps, lesions, or discharge.  Neurologic: Denies headache, dizziness, vertigo, or paresthesias.  Psychiatric: Denies changes in mood or hallucinations.  Endocrine: Denies polyuria, polydipsia, heat/cold intolerance.  Hematologic/Lymph: Denies lymphadenopathy, easy bruising or easy bleeding.  Allergic/Immunologic: Denies rash, rhinitis.     Vitals:   Temp: 98 °F (36.7 °C)  Pulse: 97  Rhythm: normal sinus rhythm  BP: 124/63  MAP (mmHg): 87  Resp: 12  SpO2: 95 %  O2 Device (Oxygen Therapy): room air    Temp  Min: 98 °F (36.7 °C)  Max: 98.8 °F (37.1 °C)  Pulse  Min: 79  Max: 99  BP  Min: 116/50  Max: 142/67  MAP (mmHg)  Min: 77  Max: 101  Resp  Min: 12  Max: 26  SpO2  Min: 89 %  Max: 99 %    12/11 0701 - 12/12 0700  In: 2585.8 [P.O.:700; I.V.:1885.8]  Out: 3100 [Urine:3100]   Unmeasured Output  Urine Occurrence: 1  Stool Occurrence: 0  Emesis Occurrence: 1  Pad Count: 1     Examination:   Constitutional: Well-nourished and -developed. No apparent distress.   Eyes: Conjunctiva clear, anicteric. Lids no lesions.  Head/Ears/Nose/Mouth/Throat/Neck: Moist mucous membranes. External ears, nose atraumatic.   Cardiovascular: Regular rhythm. No murmurs. No leg edema.  Respiratory: Comfortable respirations. Clear to auscultation.  Gastrointestinal: No hernia. Soft, nondistended, nontender. + bowel sounds.    Neurologic:  -GCS15  -Alert. Oriented to person, place, and time. Speech fluent. Follows commands.  -Cranial nerves intact, particularly III  -Motor 5/5 bilaterally   -Sensation intact bilaterally   --No drift    Medications:   Continuous  sodium chloride 0.9% Last Rate: 50 mL/hr at 12/12/18 1400   nicardipine Last Rate: 5 mg/hr  (12/12/18 1400)   Scheduled  atorvastatin 40 mg Daily   fludrocortisone 100 mcg Daily   levETIRAcetam 500 mg BID   niMODipine 60 mg Q4H   pantoprazole 40 mg Daily   senna-docusate 8.6-50 mg 1 tablet BID   sodium chloride 0.9% 500 mL Once   sodium chloride 0.9% 3 mL Q8H   PRN  acetaminophen 650 mg Q6H PRN   dextromethorphan HBr 10 mL Q6H PRN   dextrose 50% 12.5 g PRN   fentaNYL 25 mcg Q3H PRN   glucagon (human recombinant) 1 mg PRN   glucose 16 g PRN   glucose 24 g PRN   insulin aspart U-100 1-10 Units QID (AC + HS) PRN   labetalol 10 mg Q6H PRN   methylPREDNISolone sodium succinate 125 mg Q8H PRN   ondansetron 8 mg Q8H PRN   potassium chloride 10% 40 mEq PRN   potassium chloride 10% 40 mEq PRN   potassium chloride 10% 60 mEq PRN   potassium, sodium phosphates 2 packet PRN   potassium, sodium phosphates 2 packet PRN   potassium, sodium phosphates 2 packet PRN   sodium chloride 0.9% 5 mL PRN      Today I independently reviewed pertinent medications, lines/drains/airways, imaging, laboratory results, notably: CTA, sodium, UOP    ISTAT: No results for input(s): PH, PCO2, PO2, POCSATURATED, HCO3, BE, POCNA, POCK, POCTCO2, POCGLU, POCICA, POCLAC, SAMPLE in the last 24 hours.   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    - IR cerebral angiogram  -> 4 aneurysms were detected and the largest at R pCom was embolized    - nimodipine 60 mg q4 hr  - will keep SBP<140 with Cardene gtt and PRN labetalol as still has un-secure   -  keppra for seizure ppx,    - pain controlled   - TCDs daily  - PT/OT to evaluate and treat  --strict monitoring of I/O, goal euvolemia to slightly positive , IVF     Cardiac/Vascular   Essential hypertension    SBP goal < 140 due unsecured additional aneurysms  cardene prn over scheduled antihypertensives to avoid hypotension in setting of vasospasm risk              The patient is being Prophylaxed for:  Venous Thromboembolism with: Mechanical  Stress Ulcer with: Not Applicable   Ventilator Pneumonia with: not applicable    Activity Orders          Straight Cath starting at 12/08 1941        Full Code    Alyx Mckeon PA-C  Neurocritical Care  [facility]

## 2019-12-13 ENCOUNTER — OFFICE VISIT (OUTPATIENT)
Dept: NEUROLOGY | Facility: CLINIC | Age: 64
End: 2019-12-13

## 2019-12-13 VITALS — WEIGHT: 219 LBS | HEIGHT: 62 IN | HEART RATE: 83 BPM | OXYGEN SATURATION: 93 % | BODY MASS INDEX: 40.3 KG/M2

## 2019-12-13 DIAGNOSIS — R26.89 IMPAIRMENT OF BALANCE: Primary | ICD-10-CM

## 2019-12-13 DIAGNOSIS — R41.3 MEMORY LOSS: ICD-10-CM

## 2019-12-13 PROCEDURE — 99204 OFFICE O/P NEW MOD 45 MIN: CPT | Performed by: PHYSICIAN ASSISTANT

## 2019-12-13 RX ORDER — PRIMIDONE 50 MG/1
50 TABLET ORAL NIGHTLY
Qty: 30 TABLET | Refills: 11 | Status: SHIPPED | OUTPATIENT
Start: 2019-12-13 | End: 2020-12-12

## 2019-12-13 NOTE — PROGRESS NOTES
"Subjective     Chief Complaint: memory loss      History of Present Illness   Quyen Galvan is a 63 y.o. female who comes to clinic today for evaluation of memory loss . She has noted symptoms for at least several years marked initially by forgetfulness and word-finding difficulties. This has worsened over time. She denies any additional impairments. There have been associated  symptoms of depression . She denies  impairments in ADL's. She manages her medications  and finances. She is currently residing with family in [place].     She has also noted an intention tremor in her hands bilaterally for several years, which has worsened over time. She primarily notes symptoms while eating and writing.     Prior evaluation has included screening blood work  which was unremarkable .       I have reviewed and confirmed the past family, social and medical history as accurate on 12/13/19.     Review of Systems   Constitutional: Negative.    HENT: Negative.    Eyes: Negative.    Respiratory: Negative.    Cardiovascular: Negative.    Gastrointestinal: Negative.    Endocrine: Negative.    Genitourinary: Negative.    Musculoskeletal: Negative.    Skin: Negative.    Allergic/Immunologic: Negative.    Neurological: Positive for tremors.        Memory loss     Hematological: Negative.    Psychiatric/Behavioral: Positive for dysphoric mood.       Objective     Pulse 83   Ht 157.5 cm (62\")   Wt 99.3 kg (219 lb)   SpO2 93%   BMI 40.06 kg/m²     General appearance today is normal.   Peripheral pulses were present and symmetric.  The ophthalmoscopic exam today is unremarkable. The discs and posterior elements are unremarkable.      Physical Exam   Constitutional: She is oriented to person, place, and time.   Neurological: She is oriented to person, place, and time. She has normal strength. She has a normal Finger-Nose-Finger Test.   Reflex Scores:       Bicep reflexes are 1+ on the right side and 1+ on the left side.       " Brachioradialis reflexes are 1+ on the right side and 1+ on the left side.       Patellar reflexes are 1+ on the right side and 1+ on the left side.  Psychiatric: Her speech is normal.        Neurologic Exam     Mental Status   Oriented to person, place, and time.   Registration: recalls 3 of 3 objects. Recall at 5 minutes: recalls 1 of 3 objects. Follows 3 step commands.   Attention: normal.   Speech: speech is normal   Level of consciousness: alert  Able to name object. Able to read. Able to repeat. Able to write. Normal comprehension.     Cranial Nerves   Cranial nerves II through XII intact.     Motor Exam   Muscle bulk: normal  Overall muscle tone: normal    Strength   Strength 5/5 throughout.     Sensory Exam   Light touch normal.     Gait, Coordination, and Reflexes     Gait  Gait: (antalgic )    Coordination   Finger to nose coordination: normal    Tremor   Resting tremor: absent    Reflexes   Right brachioradialis: 1+  Left brachioradialis: 1+  Right biceps: 1+  Left biceps: 1+  Right patellar: 1+  Left patellar: 1+        Results  MMSE=28      Assessment/Plan   Quyen was seen today for memory loss.    Diagnoses and all orders for this visit:    Impairment of balance  -     Ambulatory Referral to Physical Therapy Evaluate and treat (balance impairment )  -     CT Head Without Contrast    Memory loss  -     CT Head Without Contrast    Other orders  -     primidone (MYSOLINE) 50 MG tablet; Take 1 tablet by mouth Every Night.          Discussion/Summary   Quyen Galvan comes to clinic today for evaluation of memory loss and Essential tremor. While Mild Cognitive Impairment is possible, I am more concerned that her symptoms are related to polypharmacy as well as her history of depression and chronic pain. This was discussed in detail. It was elected to obtain a head CT . After discussing potential treatment options, it was elected to add primidone. I have also made a referral to PT for her balance. She will  then follow up in 6 months, or sooner if needed.   I spent 45 minutes face to face with the patient and family with 25 minutes spent on discussing diagnosis, prognosis, diagnostic testing, evaluation, current status, treatment options and management as discussed above.       As part of this visit I reviewed prior lab results, reviewed outside records and obtained additional history from the family which is incorporated in the HPI.      Antonella Pryor PA-C

## 2019-12-19 ENCOUNTER — HOSPITAL ENCOUNTER (OUTPATIENT)
Dept: CT IMAGING | Facility: HOSPITAL | Age: 64
Discharge: HOME | End: 2019-12-19
Admitting: PHYSICIAN ASSISTANT

## 2019-12-19 PROCEDURE — 70450 CT HEAD/BRAIN W/O DYE: CPT

## 2020-02-20 PROBLEM — R60.0 LOWER EXTREMITY EDEMA: Status: ACTIVE | Noted: 2020-02-20

## 2020-02-20 NOTE — PROGRESS NOTES
OFFICE VISIT  NOTE  [facility] CARDIOLOGY      Name: Quyen Galvan    Date: 2020  MRN:  [id number]  :  1955      REFERRING/PRIMARY PROVIDER:  Sadaf Lanza AP*    Chief Complaint   Patient presents with   • Abnormal Echo     Consult        HPI: Quyen Galvan is a 64 y.o. female who presents today for new consultation for abnormal echo.  Patient has a history of severe COPD, ongoing tobacco abuse, 50 pack years, currently smokes 1 pack/day, hypertension, hyperlipidemia, severe fibromyalgia.  Patient reports recent hospitalization 2019 for pneumonia and COPD, was discharged with home oxygen but did not use it.  Had follow-up echocardiogram showing normal ejection fraction, normal diastolic function, mild RV enlargement and mild biatrial enlargement.  Denies palpitations.  She does report chest pains, sharp/pressure-like, occur randomly, associate with shortness of breath.  She is currently on antibiotics for bronchitis.    Past Medical History:   Diagnosis Date   • Acid reflux    • Anemia    • Anxiety    • Arthritis    • Chicken pox    • COPD (chronic obstructive pulmonary disease) (CMS/HCC)    • COPD (chronic obstructive pulmonary disease) (CMS/HCC)    • Depression    • Fatty liver    • Fibromyalgia    • Hyperlipidemia    • Hypertension    • Menopause    • Mumps    • Neuropathy        Past Surgical History:   Procedure Laterality Date   • CHOLECYSTECTOMY     • FRACTURE SURGERY      left wrist   • OVARIAN CYST REMOVAL     • SPINE SURGERY         Social History     Socioeconomic History   • Marital status:      Spouse name: Not on file   • Number of children: Not on file   • Years of education: Not on file   • Highest education level: Not on file   Tobacco Use   • Smoking status: Current Every Day Smoker     Packs/day: 1.00     Types: Cigarettes   • Smokeless tobacco: Never Used   Substance and Sexual Activity   • Alcohol use: No     Frequency: Never   •  Drug use: No   • Sexual activity: Defer   Social History Narrative    Lives with  in [place].  Independent with all ADLs       Family History   Problem Relation Age of Onset   • Hypertension Father         ROS:   Constitutional no fever,  no weight loss   Skin no rash, no subcutaneous nodules   Otolaryngeal no difficulty swallowing   Cardiovascular See HPI   Pulmonary no cough, no sputum production   Gastrointestinal no constipation, no diarrhea   Genitourinary no dysuria, no hematuria   Hematologic no easy bruisability, no abnormal bleeding   Musculoskeletal no muscle pain   Neurologic no dizziness, no falls         No Known Allergies      Current Outpatient Medications:   •  albuterol (PROVENTIL HFA;VENTOLIN HFA) 108 (90 BASE) MCG/ACT inhaler, Inhale 2 puffs every 4 (four) hours as needed for wheezing., Disp: , Rfl:   •  atorvastatin (LIPITOR) 20 MG tablet, Take 20 mg by mouth Daily., Disp: , Rfl:   •  budesonide-formoterol (SYMBICORT) 160-4.5 MCG/ACT inhaler, Inhale 2 puffs 2 (Two) Times a Day., Disp: , Rfl:   •  bumetanide (BUMEX) 1 MG tablet, Take 1 mg by mouth 2 (Two) Times a Day., Disp: , Rfl:   •  busPIRone (BUSPAR) 5 MG tablet, Take 10 mg by mouth 2 (Two) Times a Day., Disp: , Rfl:   •  Calcium Carbonate-Vit D-Min (CALCIUM 1200 PO), Take 1,200 mg by mouth 2 (Two) Times a Day., Disp: , Rfl:   •  Cholecalciferol (VITAMIN D3) 2000 units tablet, Take 2,000 Units by mouth Daily., Disp: , Rfl:   •  diclofenac (VOLTAREN) 1 % gel gel, Apply 4 g topically to the appropriate area as directed 4 (Four) Times a Day As Needed (feet and knees)., Disp: , Rfl:   •  diclofenac (VOLTAREN) 50 MG EC tablet, Take 50 mg by mouth 2 (Two) Times a Day., Disp: , Rfl:   •  docusate sodium (COLACE) 250 MG capsule, Take 250 mg by mouth 2 (Two) Times a Day., Disp: , Rfl:   •  fexofenadine (ALLEGRA) 180 MG tablet, Take 180 mg by mouth daily., Disp: , Rfl:   •  FLUoxetine (PROzac) 20 MG capsule, Take 20 mg by mouth daily., Disp: ,  Rfl:   •  fluticasone (FLONASE) 50 MCG/ACT nasal spray, 2 sprays into the nostril(s) as directed by provider Daily. Administer 2 sprays in each nostril for each dose. , Disp: , Rfl:   •  gabapentin (NEURONTIN) 800 MG tablet, Take 800 mg by mouth 3 (Three) Times a Day., Disp: , Rfl:   •  HYDROcodone-acetaminophen (NORCO)  MG per tablet, Take 1 tablet by mouth Every 6 (Six) Hours., Disp: , Rfl:   •  hydrOXYzine (ATARAX) 25 MG tablet, Take 25 mg by mouth 4 (Four) Times a Day., Disp: , Rfl:   •  ipratropium-albuterol (DUO-NEB) 0.5-2.5 mg/3 ml nebulizer, Take 3 mL by nebulization 4 (Four) Times a Day., Disp: 360 mL, Rfl: 0  •  losartan (COZAAR) 100 MG tablet, Take 100 mg by mouth Daily., Disp: , Rfl:   •  Melatonin 10 MG tablet, Take 40 mg by mouth Every Night. 4 tabs (40mg total) nightly, Disp: , Rfl:   •  misoprostol (CYTOTEC) 200 MCG tablet, Take 200 mcg by mouth 2 (Two) Times a Day., Disp: , Rfl:   •  nicotine (NICODERM CQ) 21 MG/24HR patch, Place 1 patch on the skin as directed by provider Daily., Disp: 28 patch, Rfl: 0  •  nitroglycerin (NITROSTAT) 0.4 MG SL tablet, Place 0.4 mg under the tongue every 5 (five) minutes as needed for chest pain. Take no more than 3 doses in 15 minutes., Disp: , Rfl:   •  nystatin (MYCOSTATIN) 154501 UNIT/GM cream, Apply  topically to the appropriate area as directed As Needed., Disp: , Rfl:   •  nystatin (MYCOSTATIN) 636894 UNIT/GM powder, Apply  topically to the appropriate area as directed As Needed., Disp: , Rfl:   •  omeprazole (priLOSEC) 40 MG capsule, Take 40 mg by mouth 2 (Two) Times a Day., Disp: , Rfl:   •  potassium chloride (K-DUR,KLOR-CON) 20 MEQ CR tablet, Take 20 mEq by mouth Daily., Disp: , Rfl:   •  primidone (MYSOLINE) 50 MG tablet, Take 1 tablet by mouth Every Night., Disp: 30 tablet, Rfl: 11  •  Probiotic capsule, Take 1 capsule by mouth Daily., Disp: , Rfl:   •  promethazine-dextromethorphan (PROMETHAZINE-DM) 6.25-15 MG/5ML syrup, Take 5 mL by mouth As  "Needed., Disp: , Rfl:   •  QUEtiapine (SEROquel) 50 MG tablet, Take 50 mg by mouth Every Night., Disp: , Rfl:   •  tiZANidine (ZANAFLEX) 4 MG tablet, Take 4 mg by mouth 3 (three) times a day., Disp: , Rfl:   •  triamcinolone (KENALOG) 0.1 % ointment, Apply  topically to the appropriate area as directed As Needed., Disp: , Rfl:   •  vitamin B-12 (CYANOCOBALAMIN) 2500 MCG sublingual tablet tablet, Place  under the tongue Daily., Disp: , Rfl:   •  vitamin C (ASCORBIC ACID) 500 MG tablet, Take 1,000 mg by mouth Daily., Disp: , Rfl:   •  VITAMIN E PO, Take 1 capsule by mouth Daily., Disp: , Rfl:     Vitals:    02/24/20 1341   BP: 140/74   BP Location: Left arm   Patient Position: Sitting   Pulse: 74   SpO2: 95%   Weight: 102 kg (225 lb 12.8 oz)   Height: 157.5 cm (62\")     Body mass index is 41.3 kg/m².    PHYSICAL EXAM:    General Appearance:   · well developed  · well nourished  HENT:   · oropharynx moist  · lips not cyanotic  Neck:  · thyroid not enlarged  · supple  Respiratory:  · no respiratory distress  · normal breath sounds  · no rales  Cardiovascular:  · no jugular venous distention  · regular rhythm  · apical impulse normal  · S1 normal, S2 normal  · no S3, no S4   · no murmur  · no rub, no thrill  · carotid pulses normal; no bruit  · pedal pulses normal  · lower extremity edema: none    Gastrointestinal:   · bowel sounds normal  · non-tender  · no hepatomegaly, no splenomegaly  Musculoskeletal:  · no clubbing of fingers.   · normocephalic, head atraumatic  Skin:   · warm, dry  Psychiatric:  · judgement and insight appropriate  · normal mood and affect    RESULTS:     ECG 12 Lead  Date/Time: 2/24/2020 2:36 PM  Performed by: Juan Miguel Coel MD  Authorized by: Juan Miguel Cole MD   Comparison: not compared with previous ECG   Previous ECG: no previous ECG available  Rhythm: sinus rhythm  Rate: normal  BPM: 74  QRS axis: left    Clinical impression: abnormal EKG  Comments: Possible inferior infarct " noted                   Labs:  COMPLETE BLOOD COUNT 11/14/19   Red Blood Cell Count 3.79   Hemoglobin 11.7   Hematocrit 34.7   Platelet 238       COMPLETE METABOLIC PANEL 11/4/19   Glucose 131 H   BUN 13   Creatinine 0.76   Glomerular Filtration Rate 83   Sodium 139   Potassium 3.9   Chloride 103   Carbon Dioxide 31   Calcium 8.8   AST 14   ALT 13       HEMOGLOBIN A1C 5.7 H           Lipid Panel 07/31/19    Total 168   HDL 59   Triglycerides 194   LDL 80           ASSESSMENT:  Problem List Items Addressed This Visit        Cardiovascular and Mediastinum    Essential hypertension - Primary       Respiratory    COPD with acute exacerbation (CMS/[facility])       Digestive    Obesity, Class III, BMI 40-49.9 (morbid obesity) (CMS/[facility])       Other    Tobacco abuse    Lower extremity edema    Overview     12/5/19 [place] [facility]  · LVEF= >55%  · Mild atrial enlargement                  PLAN:    1.  Chest pain:  Given patient's multiple risk factors including age, tobacco abuse, hypertension, hyperlipidemia and abnormal EKG showing possible inferior infarct we will proceed with dobutamine Cardiolite stress test.  The patient was advised to call 911 if chest pain worsens or persist despite nitroglycerin or rest.    2.  Abnormal echocardiogram:  I reviewed the echocardiogram, there is mild mitral enlargement and mild RV enlargement  Refer to pulmonary for severe COPD  14-day Holter monitor to rule out atrial arrhythmia    3.  Tobacco abuse:  We discussed the importance of complete tobacco cessation, the patient is not ready to stop at this time.    4.  Severe COPD:  Refer to pulmonary for further assessment and recommendations.    Return to clinic in 3 months, or sooner as needed.    Thank you for the opportunity to share in the care of your patient; please do not hesitate to call me with any questions.     Juan Miguel Cole MD, [facility]  Office: [phone]  [facility]  [address] [place] [address]

## 2020-02-24 ENCOUNTER — CONSULT (OUTPATIENT)
Dept: CARDIOLOGY | Facility: CLINIC | Age: 65
End: 2020-02-24

## 2020-02-24 VITALS
HEART RATE: 74 BPM | SYSTOLIC BLOOD PRESSURE: 140 MMHG | BODY MASS INDEX: 41.55 KG/M2 | WEIGHT: 225.8 LBS | DIASTOLIC BLOOD PRESSURE: 74 MMHG | HEIGHT: 62 IN | OXYGEN SATURATION: 95 %

## 2020-02-24 DIAGNOSIS — R00.2 PALPITATIONS: ICD-10-CM

## 2020-02-24 DIAGNOSIS — J44.1 COPD WITH ACUTE EXACERBATION (HCC): ICD-10-CM

## 2020-02-24 DIAGNOSIS — E66.01 OBESITY, CLASS III, BMI 40-49.9 (MORBID OBESITY) (HCC): ICD-10-CM

## 2020-02-24 DIAGNOSIS — R07.9 CHEST PAIN, UNSPECIFIED TYPE: ICD-10-CM

## 2020-02-24 DIAGNOSIS — I10 ESSENTIAL HYPERTENSION: Primary | ICD-10-CM

## 2020-02-24 DIAGNOSIS — Z72.0 TOBACCO ABUSE: ICD-10-CM

## 2020-02-24 DIAGNOSIS — R60.0 LOWER EXTREMITY EDEMA: ICD-10-CM

## 2020-02-24 PROCEDURE — 99204 OFFICE O/P NEW MOD 45 MIN: CPT | Performed by: INTERNAL MEDICINE

## 2020-02-24 PROCEDURE — 93000 ELECTROCARDIOGRAM COMPLETE: CPT | Performed by: INTERNAL MEDICINE

## 2020-03-06 ENCOUNTER — OFFICE VISIT (OUTPATIENT)
Dept: PULMONOLOGY | Facility: CLINIC | Age: 65
End: 2020-03-06

## 2020-03-06 VITALS
HEART RATE: 80 BPM | OXYGEN SATURATION: 92 % | TEMPERATURE: 98.4 F | SYSTOLIC BLOOD PRESSURE: 130 MMHG | HEIGHT: 61 IN | DIASTOLIC BLOOD PRESSURE: 82 MMHG | BODY MASS INDEX: 43.05 KG/M2 | WEIGHT: 228 LBS

## 2020-03-06 DIAGNOSIS — J44.9 CHRONIC OBSTRUCTIVE PULMONARY DISEASE, UNSPECIFIED COPD TYPE (HCC): Primary | ICD-10-CM

## 2020-03-06 PROBLEM — J43.9 PULMONARY EMPHYSEMA (HCC): Status: ACTIVE | Noted: 2020-03-06

## 2020-03-06 PROBLEM — J44.1 COPD WITH ACUTE EXACERBATION (HCC): Status: RESOLVED | Noted: 2019-09-03 | Resolved: 2020-03-06

## 2020-03-06 PROCEDURE — 94729 DIFFUSING CAPACITY: CPT | Performed by: INTERNAL MEDICINE

## 2020-03-06 PROCEDURE — 94726 PLETHYSMOGRAPHY LUNG VOLUMES: CPT | Performed by: INTERNAL MEDICINE

## 2020-03-06 PROCEDURE — 94010 BREATHING CAPACITY TEST: CPT | Performed by: INTERNAL MEDICINE

## 2020-03-06 PROCEDURE — 99203 OFFICE O/P NEW LOW 30 MIN: CPT | Performed by: INTERNAL MEDICINE

## 2020-03-06 NOTE — PROGRESS NOTES
Quyen Galvan is a 64 y.o. female here for evaluation of   Chief Complaint   Patient presents with   • Shortness of Breath   • COPD       Problem list:  1. Mild chronic bronchitis, 3/6/20 FEV1 1.51L 71%  2. SASHA non compliant  3. Hypertension  4. Osteoarthritis   5. History of lower extremity cellulitis  6. Restless leg syndrome  7. Peripheral neuropathy  8. GERD  9. Cholecystectomy  10. Left wrist open reduction and internal fixation  11. Spine surgery  12. Ovarian cyst removal  13. Childbirth x1  14. Fibromyalgia  15. Hemoglobin A1c 6.3, prediabetes    History of Present Illness    64-year-old woman, hospitalized in September 2019 with a COPD exacerbation.  She reports taking inhalers for approximately 10 years.  She was hospitalized in August in [place] and then September at [facility].  These were her first hospitalization for lung related issues.  She started smoking in her early 40s and smokes 1 pack/day for approximately 23 years.  She gets winded if she is carrying luggage up and down stairs.  She is able to do her housework, shop without difficulty.  She has trouble on stairs but it is more from arthritis.  Recently she took 2 rounds of antibiotics and steroids for a respiratory infection with improvement.  She denies a history of childhood asthma or noxious fume exposure.  She is currently using Symbicort as her maintenance inhaler and has a pro-air rescue inhaler as well as a nebulizer.  She only wheezes if she has a respiratory infection.  There is a family history of emphysema in her brother and her father.  She was diagnosed locally with obstructive sleep apnea, restless leg syndrome but cannot wear the mask because of claustrophobia.  She saw cardiology for an abnormal echocardiogram.  This revealed diastolic dysfunction and mild RV enlargement.  She was having random episodes of chest pain and dyspnea.  Her EKG had some inferior changes.  A 14-day Holter monitor was placed and a dobutamine  Cardiolite stress test was recommended.      Review of Systems   Constitutional: Positive for fatigue. Negative for activity change.   HENT: Positive for postnasal drip. Negative for congestion.    Eyes: Negative for visual disturbance.   Respiratory: Positive for apnea, cough, shortness of breath and wheezing.    Cardiovascular: Positive for palpitations and leg swelling. Negative for chest pain.   Gastrointestinal: Negative.    Musculoskeletal: Positive for arthralgias and back pain.         Current Outpatient Medications:   •  albuterol (PROVENTIL HFA;VENTOLIN HFA) 108 (90 BASE) MCG/ACT inhaler, Inhale 2 puffs every 4 (four) hours as needed for wheezing., Disp: , Rfl:   •  atorvastatin (LIPITOR) 20 MG tablet, Take 20 mg by mouth Daily., Disp: , Rfl:   •  budesonide-formoterol (SYMBICORT) 160-4.5 MCG/ACT inhaler, Inhale 2 puffs 2 (Two) Times a Day., Disp: , Rfl:   •  bumetanide (BUMEX) 1 MG tablet, Take 1 mg by mouth 2 (Two) Times a Day., Disp: , Rfl:   •  busPIRone (BUSPAR) 5 MG tablet, Take 10 mg by mouth 2 (Two) Times a Day., Disp: , Rfl:   •  Calcium Carbonate-Vit D-Min (CALCIUM 1200 PO), Take 1,200 mg by mouth 2 (Two) Times a Day., Disp: , Rfl:   •  Cholecalciferol (VITAMIN D3) 2000 units tablet, Take 2,000 Units by mouth Daily., Disp: , Rfl:   •  diclofenac (VOLTAREN) 1 % gel gel, Apply 4 g topically to the appropriate area as directed 4 (Four) Times a Day As Needed (feet and knees)., Disp: , Rfl:   •  diclofenac (VOLTAREN) 50 MG EC tablet, Take 50 mg by mouth 2 (Two) Times a Day., Disp: , Rfl:   •  docusate sodium (COLACE) 250 MG capsule, Take 250 mg by mouth 2 (Two) Times a Day., Disp: , Rfl:   •  fexofenadine (ALLEGRA) 180 MG tablet, Take 180 mg by mouth daily., Disp: , Rfl:   •  FLUoxetine (PROzac) 20 MG capsule, Take 20 mg by mouth daily., Disp: , Rfl:   •  fluticasone (FLONASE) 50 MCG/ACT nasal spray, 2 sprays into the nostril(s) as directed by provider Daily. Administer 2 sprays in each nostril for  each dose. , Disp: , Rfl:   •  gabapentin (NEURONTIN) 800 MG tablet, Take 800 mg by mouth 3 (Three) Times a Day., Disp: , Rfl:   •  HYDROcodone-acetaminophen (NORCO)  MG per tablet, Take 1 tablet by mouth Every 6 (Six) Hours., Disp: , Rfl:   •  hydrOXYzine (ATARAX) 25 MG tablet, Take 25 mg by mouth 4 (Four) Times a Day., Disp: , Rfl:   •  ipratropium-albuterol (DUO-NEB) 0.5-2.5 mg/3 ml nebulizer, Take 3 mL by nebulization 4 (Four) Times a Day., Disp: 360 mL, Rfl: 0  •  losartan (COZAAR) 100 MG tablet, Take 100 mg by mouth Daily., Disp: , Rfl:   •  Melatonin 10 MG tablet, Take 40 mg by mouth Every Night. 4 tabs (40mg total) nightly, Disp: , Rfl:   •  misoprostol (CYTOTEC) 200 MCG tablet, Take 200 mcg by mouth 2 (Two) Times a Day., Disp: , Rfl:   •  nicotine (NICODERM CQ) 21 MG/24HR patch, Place 1 patch on the skin as directed by provider Daily., Disp: 28 patch, Rfl: 0  •  nitroglycerin (NITROSTAT) 0.4 MG SL tablet, Place 0.4 mg under the tongue every 5 (five) minutes as needed for chest pain. Take no more than 3 doses in 15 minutes., Disp: , Rfl:   •  nystatin (MYCOSTATIN) 618435 UNIT/GM cream, Apply  topically to the appropriate area as directed As Needed., Disp: , Rfl:   •  nystatin (MYCOSTATIN) 573928 UNIT/GM powder, Apply  topically to the appropriate area as directed As Needed., Disp: , Rfl:   •  omeprazole (priLOSEC) 40 MG capsule, Take 40 mg by mouth 2 (Two) Times a Day., Disp: , Rfl:   •  potassium chloride (K-DUR,KLOR-CON) 20 MEQ CR tablet, Take 20 mEq by mouth Daily., Disp: , Rfl:   •  primidone (MYSOLINE) 50 MG tablet, Take 1 tablet by mouth Every Night., Disp: 30 tablet, Rfl: 11  •  Probiotic capsule, Take 1 capsule by mouth Daily., Disp: , Rfl:   •  promethazine-dextromethorphan (PROMETHAZINE-DM) 6.25-15 MG/5ML syrup, Take 5 mL by mouth As Needed., Disp: , Rfl:   •  QUEtiapine (SEROquel) 50 MG tablet, Take 50 mg by mouth Every Night., Disp: , Rfl:   •  tiZANidine (ZANAFLEX) 4 MG tablet, Take 4 mg  "by mouth 3 (three) times a day., Disp: , Rfl:   •  triamcinolone (KENALOG) 0.1 % ointment, Apply  topically to the appropriate area as directed As Needed., Disp: , Rfl:   •  vitamin B-12 (CYANOCOBALAMIN) 2500 MCG sublingual tablet tablet, Place  under the tongue Daily., Disp: , Rfl:   •  vitamin C (ASCORBIC ACID) 500 MG tablet, Take 1,000 mg by mouth Daily., Disp: , Rfl:   •  VITAMIN E PO, Take 1 capsule by mouth Daily., Disp: , Rfl:     Past Medical History:   Diagnosis Date   • Acid reflux    • Anemia    • Anxiety    • Arthritis    • Cellulitis    • Chicken pox    • COPD (chronic obstructive pulmonary disease) (CMS/HCC)    • COPD (chronic obstructive pulmonary disease) (CMS/HCC)    • Depression    • Fatty liver    • Fibromyalgia    • Hyperlipidemia    • Hypertension    • Menopause    • Mumps    • Neuropathy    • SASHA (obstructive sleep apnea)    • RLS (restless legs syndrome)      Past Surgical History:   Procedure Laterality Date   • CHOLECYSTECTOMY     • FRACTURE SURGERY      left wrist   • OVARIAN CYST REMOVAL     • SPINE SURGERY       Social History     Socioeconomic History   • Marital status:      Spouse name: Not on file   • Number of children: 1   • Years of education: Not on file   • Highest education level: Not on file   Tobacco Use   • Smoking status: Current Every Day Smoker     Packs/day: 1.00     Years: 25.00     Pack years: 25.00     Types: Cigarettes   • Smokeless tobacco: Never Used   Substance and Sexual Activity   • Alcohol use: No     Frequency: Never   • Drug use: No   • Sexual activity: Defer   Social History Narrative    Lives with  in [place].  Independent with all ADLs     Family History   Problem Relation Age of Onset   • Hypertension Father    • COPD Father    • Colon cancer Mother    • Coronary artery disease Brother    • COPD Brother    • Stroke Brother      Blood pressure 130/82, pulse 80, temperature 98.4 °F (36.9 °C), height 154.9 cm (61\"), weight 103 kg (228 lb), " SpO2 92 %.    Physical Exam   Constitutional: She is oriented to person, place, and time. She appears well-nourished. No distress.   HENT:   Head: Normocephalic and atraumatic.   Mouth/Throat: Oropharynx is clear and moist. No oropharyngeal exudate.   Eyes: Pupils are equal, round, and reactive to light. No scleral icterus.   Neck: No tracheal deviation present. No thyromegaly present.   Cardiovascular: Normal rate and normal heart sounds.   No murmur heard.  Pulmonary/Chest: Effort normal and breath sounds normal.   Left subclavian heart monitor. Prolonged expiration, no rhonchi or wheeze   Musculoskeletal: She exhibits no edema.   Lymphadenopathy:     She has no cervical adenopathy.   Neurological: She is alert and oriented to person, place, and time.   Skin: Skin is warm and dry.   Psychiatric: She has a normal mood and affect.         PFTs:  FVC 1.74 L, 62%, FEV1 1.51 L, 71%, ratio 87%, total lung capacity 3.76 L, 89%, residual volume 1.91 L, 116%, diffusion capacity 14.5, 67%, mild obstruction, no restriction, mild diffusion impairment    Radiology:  FINDINGS: Portable chest reveals the heart to be borderline enlarged.  The lung fields are grossly clear. Degenerative changes seen within the  spine. No pleural effusion or pneumothorax. No focal parenchymal  opacification present.         IMPRESSION:  Chronic changes seen within the lung fields with no evidence  of acute parenchymal disease.     D:  09/05/2019  Lab:    Quyen was seen today for shortness of breath and copd.    Diagnoses and all orders for this visit:    Chronic obstructive pulmonary disease, unspecified COPD type (CMS/HCC)  -     Pulmonary Function Test        Discussion:     64-year-old woman, active smoker with COPD here for follow-up.  Pulmonary function test really only show mild disease.  She is only smoked for 23 years.  She has had a recent infection requiring 2 rounds of antibiotics and steroids.  She was hospitalized in September with  an exacerbation.  That was her first admission for lung related disease.  She saw cardiology with some chest pain and an abnormal echocardiogram.  Because of risk factors for coronary disease, abnormal EKG and chest pain they recommended a dobutamine stress test.  She has not had this performed.  She has been wearing a monitor for 2 weeks.  Today her room air saturation was 92%.  She has untreated sleep apnea which can result in enlarged RV from nocturnal desaturation.    Continue Symbicort, 2 puffs twice daily, rescue inhaler  Add Spiriva 2 puffs daily, to see if she benefits from decreased cough and improved oxygenation  She needs to go back and see her sleep physician to see if they have any further recommendations for treatment of her sleep apnea such as an oral device.  I do not have her sleep study to know what her apnea hypotony index was.    Discussed the need for smoking cessation, not only to preserve lung function but because it is a significant risk factor for heart disease with her family history.    She can return to see me in 6 months with repeat spirometry, sooner if symptoms worsen  Clarissa Rich MD

## 2020-03-17 ENCOUNTER — TELEPHONE (OUTPATIENT)
Dept: CARDIOLOGY | Facility: CLINIC | Age: 65
End: 2020-03-17

## 2020-03-17 DIAGNOSIS — R07.9 CHEST PAIN, UNSPECIFIED TYPE: Primary | ICD-10-CM

## 2020-03-17 NOTE — TELEPHONE ENCOUNTER
Patient called reporting that she did not want to have her stress test. She would rather have a [facility] instead. Is this OK with you?    Echo- showed mild mitral enlargement and mild RV enlargement.  EKG showing possible inferior infarct.

## 2020-03-19 ENCOUNTER — TELEPHONE (OUTPATIENT)
Dept: CARDIOLOGY | Facility: CLINIC | Age: 65
End: 2020-03-19

## 2020-03-19 NOTE — TELEPHONE ENCOUNTER
LVM for pt to return my call due to the fact that we are not proceeding with elective stress tests with regard to COVID-19 outbreak. Advised she may still have her stress test if she is willing and we are working on rescheduling stress tests for a later date. I will await pt return call to discuss further.

## 2020-03-19 NOTE — TELEPHONE ENCOUNTER
Pt returned call and is refusing to have a stress test and will wait until heart cath may be done. Pt understands if changes occur to call us and report symptoms.

## 2020-03-25 ENCOUNTER — APPOINTMENT (OUTPATIENT)
Dept: CARDIOLOGY | Facility: HOSPITAL | Age: 65
End: 2020-03-25

## 2020-04-08 NOTE — TELEPHONE ENCOUNTER
Pt called in stating she likes the Spiriva 1.25mcg samples she was given at her last 3/6/2020 OV and would like a RX. Rx Spiriva 1.25mcg request was fulfilled and sent to pt requested pharmacy- [place], [place]. Pt verbalized understanding.

## 2020-04-10 NOTE — TELEPHONE ENCOUNTER
Fax request for pt was sent from [facility] pharmacy requesting Rx  Incruse Ellipta 62.5mcg inh due to insurance preference. Request fulfilled.

## 2020-06-03 PROBLEM — R07.9 CHEST PAIN: Status: ACTIVE | Noted: 2020-06-03

## 2020-06-07 ENCOUNTER — APPOINTMENT (OUTPATIENT)
Dept: PREADMISSION TESTING | Facility: HOSPITAL | Age: 65
End: 2020-06-07

## 2020-06-07 PROCEDURE — U0002 COVID-19 LAB TEST NON-CDC: HCPCS

## 2020-06-07 PROCEDURE — U0004 COV-19 TEST NON-CDC HGH THRU: HCPCS

## 2020-06-07 PROCEDURE — C9803 HOPD COVID-19 SPEC COLLECT: HCPCS

## 2020-06-08 LAB
REF LAB TEST METHOD: NORMAL
SARS-COV-2 RNA RESP QL NAA+PROBE: NOT DETECTED

## 2020-06-09 ENCOUNTER — PREP FOR SURGERY (OUTPATIENT)
Dept: OTHER | Facility: HOSPITAL | Age: 65
End: 2020-06-09

## 2020-06-09 DIAGNOSIS — R07.9 CHEST PAIN, UNSPECIFIED TYPE: Primary | ICD-10-CM

## 2020-06-09 RX ORDER — ASPIRIN 325 MG
325 TABLET ORAL ONCE
Status: CANCELLED | OUTPATIENT
Start: 2020-06-09 | End: 2020-06-09

## 2020-06-09 RX ORDER — ONDANSETRON 2 MG/ML
4 INJECTION INTRAMUSCULAR; INTRAVENOUS EVERY 8 HOURS PRN
Status: CANCELLED | OUTPATIENT
Start: 2020-06-09

## 2020-06-09 RX ORDER — ASPIRIN 325 MG
325 TABLET, DELAYED RELEASE (ENTERIC COATED) ORAL DAILY
Status: CANCELLED | OUTPATIENT
Start: 2020-06-10

## 2020-06-09 RX ORDER — SODIUM CHLORIDE 0.9 % (FLUSH) 0.9 %
10 SYRINGE (ML) INJECTION AS NEEDED
Status: CANCELLED | OUTPATIENT
Start: 2020-06-09

## 2020-06-09 RX ORDER — SODIUM CHLORIDE 0.9 % (FLUSH) 0.9 %
3 SYRINGE (ML) INJECTION EVERY 12 HOURS SCHEDULED
Status: CANCELLED | OUTPATIENT
Start: 2020-06-09

## 2020-06-09 RX ORDER — NITROGLYCERIN 0.4 MG/1
0.4 TABLET SUBLINGUAL
Status: CANCELLED | OUTPATIENT
Start: 2020-06-09

## 2020-06-10 ENCOUNTER — HOSPITAL ENCOUNTER (OUTPATIENT)
Facility: HOSPITAL | Age: 65
Discharge: HOME | End: 2020-06-10
Attending: INTERNAL MEDICINE | Admitting: INTERNAL MEDICINE

## 2020-06-10 VITALS
RESPIRATION RATE: 19 BRPM | HEIGHT: 62 IN | TEMPERATURE: 97.8 F | BODY MASS INDEX: 42.35 KG/M2 | DIASTOLIC BLOOD PRESSURE: 74 MMHG | SYSTOLIC BLOOD PRESSURE: 142 MMHG | OXYGEN SATURATION: 96 % | WEIGHT: 230.16 LBS | HEART RATE: 57 BPM

## 2020-06-10 DIAGNOSIS — R07.9 CHEST PAIN, UNSPECIFIED TYPE: ICD-10-CM

## 2020-06-10 PROBLEM — I25.118 CORONARY ARTERY DISEASE OF NATIVE ARTERY OF NATIVE HEART WITH STABLE ANGINA PECTORIS (HCC): Status: ACTIVE | Noted: 2020-06-10

## 2020-06-10 LAB
ALBUMIN SERPL-MCNC: 3.8 G/DL (ref 3.5–5.2)
ALBUMIN/GLOB SERPL: 1.4 G/DL
ALP SERPL-CCNC: 87 U/L (ref 39–117)
ALT SERPL W P-5'-P-CCNC: 15 U/L (ref 1–33)
ANION GAP SERPL CALCULATED.3IONS-SCNC: 13 MMOL/L (ref 5–15)
AST SERPL-CCNC: 20 U/L (ref 1–32)
BASOPHILS # BLD AUTO: 0.03 10*3/MM3 (ref 0–0.2)
BASOPHILS NFR BLD AUTO: 0.4 % (ref 0–1.5)
BILIRUB SERPL-MCNC: 0.2 MG/DL (ref 0.2–1.2)
BUN BLD-MCNC: 7 MG/DL (ref 8–23)
BUN/CREAT SERPL: 8.2 (ref 7–25)
CALCIUM SPEC-SCNC: 8.5 MG/DL (ref 8.6–10.5)
CHLORIDE SERPL-SCNC: 93 MMOL/L (ref 98–107)
CHOLEST SERPL-MCNC: 154 MG/DL (ref 0–200)
CO2 SERPL-SCNC: 27 MMOL/L (ref 22–29)
CREAT BLD-MCNC: 0.85 MG/DL (ref 0.57–1)
DEPRECATED RDW RBC AUTO: 48.1 FL (ref 37–54)
EOSINOPHIL # BLD AUTO: 0.12 10*3/MM3 (ref 0–0.4)
EOSINOPHIL NFR BLD AUTO: 1.5 % (ref 0.3–6.2)
ERYTHROCYTE [DISTWIDTH] IN BLOOD BY AUTOMATED COUNT: 13.2 % (ref 12.3–15.4)
GFR SERPL CREATININE-BSD FRML MDRD: 67 ML/MIN/1.73
GLOBULIN UR ELPH-MCNC: 2.7 GM/DL
GLUCOSE BLD-MCNC: 103 MG/DL (ref 65–99)
HBA1C MFR BLD: 6.1 % (ref 4.8–5.6)
HCT VFR BLD AUTO: 35.7 % (ref 34–46.6)
HDLC SERPL-MCNC: 63 MG/DL (ref 40–60)
HGB BLD-MCNC: 11.8 G/DL (ref 12–15.9)
IMM GRANULOCYTES # BLD AUTO: 0.03 10*3/MM3 (ref 0–0.05)
IMM GRANULOCYTES NFR BLD AUTO: 0.4 % (ref 0–0.5)
LDLC SERPL CALC-MCNC: 71 MG/DL (ref 0–100)
LDLC/HDLC SERPL: 1.12 {RATIO}
LYMPHOCYTES # BLD AUTO: 2.52 10*3/MM3 (ref 0.7–3.1)
LYMPHOCYTES NFR BLD AUTO: 31.9 % (ref 19.6–45.3)
MCH RBC QN AUTO: 32.7 PG (ref 26.6–33)
MCHC RBC AUTO-ENTMCNC: 33.1 G/DL (ref 31.5–35.7)
MCV RBC AUTO: 98.9 FL (ref 79–97)
MONOCYTES # BLD AUTO: 0.75 10*3/MM3 (ref 0.1–0.9)
MONOCYTES NFR BLD AUTO: 9.5 % (ref 5–12)
NEUTROPHILS # BLD AUTO: 4.46 10*3/MM3 (ref 1.7–7)
NEUTROPHILS NFR BLD AUTO: 56.3 % (ref 42.7–76)
NRBC BLD AUTO-RTO: 0 /100 WBC (ref 0–0.2)
PLATELET # BLD AUTO: 211 10*3/MM3 (ref 140–450)
PMV BLD AUTO: 10 FL (ref 6–12)
POTASSIUM BLD-SCNC: 4.3 MMOL/L (ref 3.5–5.2)
PROT SERPL-MCNC: 6.5 G/DL (ref 6–8.5)
RBC # BLD AUTO: 3.61 10*6/MM3 (ref 3.77–5.28)
SODIUM BLD-SCNC: 133 MMOL/L (ref 136–145)
TRIGL SERPL-MCNC: 102 MG/DL (ref 0–150)
VLDLC SERPL-MCNC: 20.4 MG/DL
WBC NRBC COR # BLD: 7.91 10*3/MM3 (ref 3.4–10.8)

## 2020-06-10 PROCEDURE — 93458 L HRT ARTERY/VENTRICLE ANGIO: CPT | Performed by: INTERNAL MEDICINE

## 2020-06-10 PROCEDURE — C1894 INTRO/SHEATH, NON-LASER: HCPCS | Performed by: INTERNAL MEDICINE

## 2020-06-10 PROCEDURE — C1769 GUIDE WIRE: HCPCS | Performed by: INTERNAL MEDICINE

## 2020-06-10 PROCEDURE — 80061 LIPID PANEL: CPT | Performed by: INTERNAL MEDICINE

## 2020-06-10 PROCEDURE — 85025 COMPLETE CBC W/AUTO DIFF WBC: CPT | Performed by: INTERNAL MEDICINE

## 2020-06-10 PROCEDURE — 25010000002 HEPARIN (PORCINE) PER 1000 UNITS: Performed by: INTERNAL MEDICINE

## 2020-06-10 PROCEDURE — 83036 HEMOGLOBIN GLYCOSYLATED A1C: CPT | Performed by: INTERNAL MEDICINE

## 2020-06-10 PROCEDURE — 25010000002 MIDAZOLAM PER 1 MG: Performed by: INTERNAL MEDICINE

## 2020-06-10 PROCEDURE — 36415 COLL VENOUS BLD VENIPUNCTURE: CPT

## 2020-06-10 PROCEDURE — 25010000002 FENTANYL CITRATE (PF) 100 MCG/2ML SOLUTION: Performed by: INTERNAL MEDICINE

## 2020-06-10 PROCEDURE — C1760 CLOSURE DEV, VASC: HCPCS | Performed by: INTERNAL MEDICINE

## 2020-06-10 PROCEDURE — 0 IOPAMIDOL PER 1 ML: Performed by: INTERNAL MEDICINE

## 2020-06-10 PROCEDURE — 80053 COMPREHEN METABOLIC PANEL: CPT | Performed by: INTERNAL MEDICINE

## 2020-06-10 RX ORDER — SODIUM CHLORIDE 9 MG/ML
INJECTION, SOLUTION INTRAVENOUS CONTINUOUS PRN
Status: COMPLETED | OUTPATIENT
Start: 2020-06-10 | End: 2020-06-10

## 2020-06-10 RX ORDER — ASPIRIN 81 MG/1
81 TABLET ORAL DAILY
Qty: 90 TABLET | Refills: 3 | Status: SHIPPED | OUTPATIENT
Start: 2020-06-10 | End: 2021-03-22

## 2020-06-10 RX ORDER — ASPIRIN 325 MG
325 TABLET, DELAYED RELEASE (ENTERIC COATED) ORAL DAILY
Status: DISCONTINUED | OUTPATIENT
Start: 2020-06-11 | End: 2020-06-10 | Stop reason: HOSPADM

## 2020-06-10 RX ORDER — SODIUM CHLORIDE 9 MG/ML
250 INJECTION, SOLUTION INTRAVENOUS ONCE AS NEEDED
Status: DISCONTINUED | OUTPATIENT
Start: 2020-06-10 | End: 2020-06-10 | Stop reason: HOSPADM

## 2020-06-10 RX ORDER — SODIUM CHLORIDE 9 MG/ML
3 INJECTION, SOLUTION INTRAVENOUS CONTINUOUS
Status: ACTIVE | OUTPATIENT
Start: 2020-06-10 | End: 2020-06-10

## 2020-06-10 RX ORDER — LIDOCAINE HYDROCHLORIDE 10 MG/ML
INJECTION, SOLUTION EPIDURAL; INFILTRATION; INTRACAUDAL; PERINEURAL AS NEEDED
Status: DISCONTINUED | OUTPATIENT
Start: 2020-06-10 | End: 2020-06-10 | Stop reason: HOSPADM

## 2020-06-10 RX ORDER — ASPIRIN 325 MG
325 TABLET ORAL ONCE
Status: COMPLETED | OUTPATIENT
Start: 2020-06-10 | End: 2020-06-10

## 2020-06-10 RX ORDER — SODIUM CHLORIDE 0.9 % (FLUSH) 0.9 %
10 SYRINGE (ML) INJECTION AS NEEDED
Status: DISCONTINUED | OUTPATIENT
Start: 2020-06-10 | End: 2020-06-10 | Stop reason: HOSPADM

## 2020-06-10 RX ORDER — ONDANSETRON 2 MG/ML
4 INJECTION INTRAMUSCULAR; INTRAVENOUS EVERY 8 HOURS PRN
Status: DISCONTINUED | OUTPATIENT
Start: 2020-06-10 | End: 2020-06-10 | Stop reason: HOSPADM

## 2020-06-10 RX ORDER — SODIUM CHLORIDE 0.9 % (FLUSH) 0.9 %
3 SYRINGE (ML) INJECTION EVERY 12 HOURS SCHEDULED
Status: DISCONTINUED | OUTPATIENT
Start: 2020-06-10 | End: 2020-06-10 | Stop reason: HOSPADM

## 2020-06-10 RX ORDER — MIDAZOLAM HYDROCHLORIDE 1 MG/ML
INJECTION INTRAMUSCULAR; INTRAVENOUS AS NEEDED
Status: DISCONTINUED | OUTPATIENT
Start: 2020-06-10 | End: 2020-06-10 | Stop reason: HOSPADM

## 2020-06-10 RX ORDER — FENTANYL CITRATE 50 UG/ML
INJECTION, SOLUTION INTRAMUSCULAR; INTRAVENOUS AS NEEDED
Status: DISCONTINUED | OUTPATIENT
Start: 2020-06-10 | End: 2020-06-10 | Stop reason: HOSPADM

## 2020-06-10 RX ORDER — NITROGLYCERIN 0.4 MG/1
0.4 TABLET SUBLINGUAL
Status: DISCONTINUED | OUTPATIENT
Start: 2020-06-10 | End: 2020-06-10 | Stop reason: HOSPADM

## 2020-06-10 RX ADMIN — ASPIRIN 325 MG ORAL TABLET 325 MG: 325 PILL ORAL at 07:55

## 2020-06-10 RX ADMIN — SODIUM CHLORIDE 3 ML/KG/HR: 9 INJECTION, SOLUTION INTRAVENOUS at 07:55

## 2020-06-10 NOTE — H&P
Primary Cardiologist: Dr. Cole     Chief Complaint: chest pain       Subjective:     Patient is a 64 y.o. female who presents with chest pain for LHC +/- CBI. She was initially seen in consult in the hospital in February following an abnormal echocardiogram and chest pain. She has a history of severe COPD, tobacco abuse, htn, and hld. She was initially scheduled for stress test but has deferred and presents for LHC. She describes the chest pain as sharp, pressure-like and has associated shortness of breath. She has not had any changes since she was last seen. No recent fever, chills. She has a chronic cough.       Past Medical History:   Diagnosis Date   • Acid reflux    • Anemia    • Anxiety    • Arthritis    • Bursitis of both hips    • Cellulitis    • Chicken pox    • COPD (chronic obstructive pulmonary disease) (CMS/HCC)    • COPD (chronic obstructive pulmonary disease) (CMS/HCC)    • Depression    • Fatty liver    • Fibromyalgia    • Hyperlipidemia    • Hypertension    • IBS (irritable bowel syndrome)    • Inflammatory arthritis    • Menopause    • Mumps    • Neuropathy    • SASHA (obstructive sleep apnea)    • Osteoarthritis    • Rheumatoid arthritis (CMS/HCC)    • RLS (restless legs syndrome)       Past Surgical History:   Procedure Laterality Date   • CARDIAC CATHETERIZATION     • CHOLECYSTECTOMY     • FRACTURE SURGERY      left wrist   • OVARIAN CYST REMOVAL     • SPINE SURGERY        No Known Allergies  Social History     Tobacco Use   • Smoking status: Current Every Day Smoker     Packs/day: 1.00     Years: 25.00     Pack years: 25.00     Types: Cigarettes   • Smokeless tobacco: Never Used   Substance Use Topics   • Alcohol use: No     Frequency: Never      FH:   Family History   Problem Relation Age of Onset   • Hypertension Father    • COPD Father    • Colon cancer Mother    • Coronary artery disease Brother    • COPD Brother    • Stroke Brother           Current Facility-Administered Medications:   •   "aspirin tablet 325 mg, 325 mg, Oral, Once **AND** [START ON 6/11/2020] aspirin EC tablet 325 mg, 325 mg, Oral, Daily, Mansoor Geiger PA  •  nitroglycerin (NITROSTAT) SL tablet 0.4 mg, 0.4 mg, Sublingual, Q5 Min PRN, Mansoor Geiger PA  •  ondansetron (ZOFRAN) injection 4 mg, 4 mg, Intravenous, Q8H PRN, Mansoor Geiger PA  •  sodium chloride 0.9 % flush 10 mL, 10 mL, Intravenous, PRN, Mansoor Geiger PA  •  sodium chloride 0.9 % flush 3 mL, 3 mL, Intravenous, Q12H, Mansoor Geiger PA  •  sodium chloride 0.9 % infusion, 3 mL/kg/hr, Intravenous, Continuous, Juan Miguel Cole MD    Review of Systems  Review of Systems:  General: no recent weight loss/gain, weakness or fatigue  Skin: no rashes, lumps, or other skin changes  HEENT: no dizziness, lightheadedness, or vision changes  Respiratory: no cough or hemoptysis  Cardiovascular: + Chest Pain with SOB/GARCIA  Gastrointestinal: no black/tarry stools or diarrhea  Urinary: no change in frequency or urgency  Peripheral Vascular: no claudication or leg cramps  Musculoskeletal: no muscle or joint pain/stiffness  Psychiatric: no depression or excessive stress  Neurological: no sensory or motor loss, no syncope  Hematologic: no anemia, easy bruising or bleeding  Endocrine: no thyroid problems, nor heat or cold intolerance        Objective:       Ht 157.5 cm (62\")   Wt 104 kg (230 lb 2.6 oz)   BMI 42.10 kg/m²     No intake/output data recorded.  No intake/output data recorded.    Physical Exam:  General-Well Nourished, Well developed  Eyes - PERRLA  Neck- supple, No mass  CV- regular rate and rhythm, no MRG  Lung- clear bilaterally  Abd- soft, +BS  Musc/skel - Norm strength and range of motion  Skin- warm and dry  Neuro - Alert & Oriented x 3, appropriate mood.      Data Review:     No results found for this or any previous visit (from the past 24 hour(s)).        Assessment:     Chest pain         Plan:   1. Chest pain   - patient with typical anginal " symptoms and EKG with possible inferior infarct.   - multiple risk factors including age, tobacco abuse, HTN, and HLD.   - will plan for C +/- CBI. Risks, benefits, and alternatives have been discussed and patient wishes to proceed.     Electronically signed by KEVIN Bunch, 06/10/20, 7:49 AM.    The risks, benefits, and alternative options of cardiac catheterization were discussed with the patient.  The risks include death, MI, stroke, infection, vascular injury requiring surgical repair and/or blood transfusion, coronary dissection, renal dysfunction/failure, allergic reaction, emergent CABG.  If PCI is needed there is a 1-2% risk of emergent CABG.  The patient is agreeable for cardiac catheterization, possible PCI or CABG. Plan is to proceed with cardiac catheterization and possible PCI.     Juan Miguel Cole M.D., F.A.C.C.  Interventional Cardiology  06/10/20  08:13

## 2020-07-24 NOTE — PROGRESS NOTES
OFFICE VISIT  NOTE  [facility] CARDIOLOGY      Name: Quyen Galvan    Date: 2020  MRN:  [id number]  :  1955      REFERRING/PRIMARY PROVIDER:  Akshat Dawson MD    Chief Complaint   Patient presents with   • Coronary Artery Disease   • Hypertension       HPI: Quyen Galvan is a 64 y.o. female who presents today for follow-up for CAD and hypertension.  Patient has a history of severe COPD, ongoing tobacco abuse, 50 pack years, currently smokes 1 pack/day, hypertension, hyperlipidemia, severe fibromyalgia.  echocardiogram 2020 showing normal ejection fraction, normal diastolic function, mild RV enlargement and mild biatrial enlargement.  Denies palpitations.  Cardiac catheterization 2020 showed 50 to 60% first diagonal, 30 to 40% proximal RCA, normal LAD and circumflex, elevated LVEDP at 35 mmHg.  Denies chest pain or shortness of breath since that time.  She doubled her Bumex after catheterization and reports relief of edema.  Shortness of breath is much improved after starting Trelegy inhaler by PCP.  Still smoking, no desire to quit at this time.    Past Medical History:   Diagnosis Date   • Acid reflux    • Anemia    • Anxiety    • Arthritis    • Bursitis of both hips    • Cellulitis    • Chicken pox    • COPD (chronic obstructive pulmonary disease) (CMS/HCC)    • COPD (chronic obstructive pulmonary disease) (CMS/HCC)    • Depression    • Fatty liver    • Fibromyalgia    • Hyperlipidemia    • Hypertension    • IBS (irritable bowel syndrome)    • Inflammatory arthritis    • Menopause    • Mumps    • Neuropathy    • SASHA (obstructive sleep apnea)    • Osteoarthritis    • Rheumatoid arthritis (CMS/HCC)    • RLS (restless legs syndrome)        Past Surgical History:   Procedure Laterality Date   • CARDIAC CATHETERIZATION     • CARDIAC CATHETERIZATION N/A 6/10/2020    Procedure: Left Heart Cath;  Surgeon: Juan Miguel Cole MD;  Location: [facility] CATH INVASIVE LOCATION;   Service: Cardiovascular;  Laterality: N/A;   • CHOLECYSTECTOMY     • FRACTURE SURGERY      left wrist   • OVARIAN CYST REMOVAL     • SPINE SURGERY         Social History     Socioeconomic History   • Marital status:      Spouse name: Not on file   • Number of children: 1   • Years of education: Not on file   • Highest education level: Not on file   Tobacco Use   • Smoking status: Current Every Day Smoker     Packs/day: 0.50     Years: 25.00     Pack years: 12.50     Types: Cigarettes   • Smokeless tobacco: Never Used   Substance and Sexual Activity   • Alcohol use: No     Frequency: Never   • Drug use: No   • Sexual activity: Defer   Social History Narrative    Lives with  in [place].  Independent with all ADLs       Family History   Problem Relation Age of Onset   • Hypertension Father    • COPD Father    • Colon cancer Mother    • Coronary artery disease Brother    • COPD Brother    • Stroke Brother         ROS:   Constitutional no fever,  no weight loss   Skin no rash, no subcutaneous nodules   Otolaryngeal no difficulty swallowing   Cardiovascular See HPI   Pulmonary no cough, no sputum production   Gastrointestinal no constipation, no diarrhea   Genitourinary no dysuria, no hematuria   Hematologic no easy bruisability, no abnormal bleeding   Musculoskeletal no muscle pain   Neurologic no dizziness, no falls         No Known Allergies      Current Outpatient Medications:   •  albuterol (PROVENTIL HFA;VENTOLIN HFA) 108 (90 BASE) MCG/ACT inhaler, Inhale 2 puffs every 4 (four) hours as needed for wheezing., Disp: , Rfl:   •  aspirin 81 MG EC tablet, Take 1 tablet by mouth Daily., Disp: 90 tablet, Rfl: 3  •  atorvastatin (LIPITOR) 20 MG tablet, Take 20 mg by mouth Daily., Disp: , Rfl:   •  bumetanide (BUMEX) 1 MG tablet, Take 1 mg by mouth 2 (Two) Times a Day., Disp: , Rfl:   •  busPIRone (BUSPAR) 5 MG tablet, Take 10 mg by mouth 2 (Two) Times a Day., Disp: , Rfl:   •  Calcium Carbonate-Vit D-Min  (CALCIUM 1200 PO), Take 1,200 mg by mouth 2 (Two) Times a Day., Disp: , Rfl:   •  Cholecalciferol (VITAMIN D3) 2000 units tablet, Take 2,000 Units by mouth Daily., Disp: , Rfl:   •  diclofenac (VOLTAREN) 1 % gel gel, Apply 4 g topically to the appropriate area as directed 4 (Four) Times a Day As Needed (feet and knees)., Disp: , Rfl:   •  diclofenac (VOLTAREN) 50 MG EC tablet, Take 50 mg by mouth 2 (Two) Times a Day., Disp: , Rfl:   •  docusate sodium (COLACE) 250 MG capsule, Take 250 mg by mouth 2 (Two) Times a Day., Disp: , Rfl:   •  fexofenadine (ALLEGRA) 180 MG tablet, Take 180 mg by mouth daily., Disp: , Rfl:   •  FLUoxetine (PROzac) 20 MG capsule, Take 20 mg by mouth daily., Disp: , Rfl:   •  fluticasone (FLONASE) 50 MCG/ACT nasal spray, 2 sprays into the nostril(s) as directed by provider Daily As Needed. Administer 2 sprays in each nostril for each dose. , Disp: , Rfl:   •  Fluticasone-Umeclidin-Vilant (Trelegy Ellipta) 100-62.5-25 MCG/INH aerosol powder , Inhale 1 puff Daily., Disp: , Rfl:   •  gabapentin (NEURONTIN) 800 MG tablet, Take 800 mg by mouth 3 (Three) Times a Day., Disp: , Rfl:   •  HYDROcodone-acetaminophen (NORCO)  MG per tablet, Take 1 tablet by mouth Every 6 (Six) Hours., Disp: , Rfl:   •  hydrOXYzine (ATARAX) 25 MG tablet, Take 25 mg by mouth 4 (Four) Times a Day., Disp: , Rfl:   •  ipratropium-albuterol (DUO-NEB) 0.5-2.5 mg/3 ml nebulizer, Take 3 mL by nebulization 4 (Four) Times a Day. (Patient taking differently: Take 3 mL by nebulization 4 (Four) Times a Day As Needed.), Disp: 360 mL, Rfl: 0  •  losartan (COZAAR) 100 MG tablet, Take 100 mg by mouth Daily., Disp: , Rfl:   •  Melatonin 10 MG tablet, Take 40 mg by mouth Every Night. 4 tabs (40mg total) nightly, Disp: , Rfl:   •  misoprostol (CYTOTEC) 200 MCG tablet, Take 200 mcg by mouth 2 (Two) Times a Day., Disp: , Rfl:   •  nitroglycerin (NITROSTAT) 0.4 MG SL tablet, Place 0.4 mg under the tongue Every 5 (Five) Minutes As Needed  "for Chest Pain. Take no more than 3 doses in 15 minutes., Disp: , Rfl:   •  nystatin (MYCOSTATIN) 569086 UNIT/GM cream, Apply  topically to the appropriate area as directed As Needed., Disp: , Rfl:   •  nystatin (MYCOSTATIN) 623118 UNIT/GM powder, Apply  topically to the appropriate area as directed As Needed., Disp: , Rfl:   •  omeprazole (priLOSEC) 40 MG capsule, Take 40 mg by mouth 2 (Two) Times a Day., Disp: , Rfl:   •  potassium chloride (K-DUR,KLOR-CON) 20 MEQ CR tablet, Take 20 mEq by mouth 2 (Two) Times a Day., Disp: , Rfl:   •  primidone (MYSOLINE) 50 MG tablet, Take 1 tablet by mouth Every Night., Disp: 30 tablet, Rfl: 11  •  Probiotic capsule, Take 1 capsule by mouth Daily., Disp: , Rfl:   •  promethazine-dextromethorphan (PROMETHAZINE-DM) 6.25-15 MG/5ML syrup, Take 5 mL by mouth As Needed., Disp: , Rfl:   •  QUEtiapine (SEROquel) 50 MG tablet, Take 50 mg by mouth Every Night., Disp: , Rfl:   •  tiZANidine (ZANAFLEX) 4 MG tablet, Take 4 mg by mouth 3 (three) times a day., Disp: , Rfl:   •  triamcinolone (KENALOG) 0.1 % ointment, Apply  topically to the appropriate area as directed As Needed., Disp: , Rfl:   •  vitamin B-12 (CYANOCOBALAMIN) 2500 MCG sublingual tablet tablet, Place  under the tongue Daily., Disp: , Rfl:   •  vitamin C (ASCORBIC ACID) 500 MG tablet, Take 1,000 mg by mouth Daily., Disp: , Rfl:   •  VITAMIN E PO, Take 1 capsule by mouth Daily., Disp: , Rfl:     Vitals:    07/27/20 1511   BP: 102/56   BP Location: Right arm   Patient Position: Sitting   Pulse: 75   Temp: 97.7 °F (36.5 °C)   SpO2: 98%   Weight: 103 kg (227 lb 9.6 oz)   Height: 157.5 cm (62\")     Body mass index is 41.63 kg/m².    PHYSICAL EXAM:    General Appearance:   · well developed  · well nourished  HENT:   · oropharynx moist  · lips not cyanotic  Neck:  · thyroid not enlarged  · supple  Respiratory:  · no respiratory distress  · normal breath sounds  · no rales  Cardiovascular:  · no jugular venous distention  · regular " rhythm  · apical impulse normal  · S1 normal, S2 normal  · no S3, no S4   · no murmur  · no rub, no thrill  · carotid pulses normal; no bruit  · pedal pulses normal  · lower extremity edema: none    Gastrointestinal:   · bowel sounds normal  · non-tender  · no hepatomegaly, no splenomegaly  Musculoskeletal:  · no clubbing of fingers.   · normocephalic, head atraumatic  Skin:   · warm, dry  Psychiatric:  · judgement and insight appropriate  · normal mood and affect    RESULTS:   Procedures           Labs:  COMPLETE BLOOD COUNT 11/14/19   Red Blood Cell Count 3.79   Hemoglobin 11.7   Hematocrit 34.7   Platelet 238       COMPLETE METABOLIC PANEL 11/4/19   Glucose 131 H   BUN 13   Creatinine 0.76   Glomerular Filtration Rate 83   Sodium 139   Potassium 3.9   Chloride 103   Carbon Dioxide 31   Calcium 8.8   AST 14   ALT 13       HEMOGLOBIN A1C 5.7 H           Lipid Panel 07/31/19    Total 168   HDL 59   Triglycerides 194   LDL 80           ASSESSMENT:  Problem List Items Addressed This Visit        Cardiovascular and Mediastinum    Essential hypertension    Coronary artery disease of native artery of native heart with stable angina pectoris (CMS/HCC) - Primary    Overview     6/10/2020: Cardiac catheterization showed 50 to 60% ostial first diagonal lesion, diagonal is a medium caliber vessel, otherwise normal LAD, circumflex, and RCA.  LVEDP elevated at 35 mmHg, LVEF 55-60% with normal wall motion.         Relevant Medications    nitroglycerin (NITROSTAT) 0.4 MG SL tablet       Nervous and Auditory    Chest pain    Overview     02/2020 Holter  · 46 SVT, longest 14 sec  · No evidence or Afib or Aflutter  · Avg HR 74            Other    Tobacco abuse    Lower extremity edema    Overview     [place] [facility] [place] LVEF= >55%  · Mild atrial enlargement               PLAN:    1.  Chest pain:  Patient refused Cardiolite stress.   Cardiac catheterization 06/10/20 revealed 50-60% ostial first diagonal lesion,  otherwise normal LAD, circumflex, and RCA. LVEF 55-60%  No chest pain currently.    Continue aspirin and atorvastatin for CAD  The patient was advised to call 911 if chest pain worsens or persist despite nitroglycerin or rest.    2.  Abnormal echocardiogram:  I reviewed the echocardiogram, there is mild mitral enlargement and mild RV enlargement  Refer to pulmonary for severe COPD  14-day Holter 02/2020 revealed 46 runs of SVT longest was 14 seconds.     3.  Tobacco abuse:  We discussed the importance of complete tobacco cessation, the patient is not ready to stop at this time.    4.  Paroxysmal SVT:  Relatively asymptomatic currently  No medical therapy necessary at this time    5.  Chronic diastolic heart failure:  LVEDP 35 mmHg 6-2020 during cardiac catheterization  Continue Bumex 2 mg daily  Renal function normal  Increase exercise and monitor blood pressure closely    Return to clinic in 6 months, or sooner as needed.    Thank you for the opportunity to share in the care of your patient; please do not hesitate to call me with any questions.     Juan Miguel Cole MD, [facility]  Office: [phone] [address] [place] [address]

## 2020-07-27 ENCOUNTER — OFFICE VISIT (OUTPATIENT)
Dept: CARDIOLOGY | Facility: CLINIC | Age: 65
End: 2020-07-27

## 2020-07-27 VITALS
WEIGHT: 227.6 LBS | TEMPERATURE: 97.7 F | DIASTOLIC BLOOD PRESSURE: 56 MMHG | BODY MASS INDEX: 41.88 KG/M2 | OXYGEN SATURATION: 98 % | HEIGHT: 62 IN | HEART RATE: 75 BPM | SYSTOLIC BLOOD PRESSURE: 102 MMHG

## 2020-07-27 DIAGNOSIS — R60.0 LOWER EXTREMITY EDEMA: ICD-10-CM

## 2020-07-27 DIAGNOSIS — I25.118 CORONARY ARTERY DISEASE OF NATIVE ARTERY OF NATIVE HEART WITH STABLE ANGINA PECTORIS (HCC): Primary | ICD-10-CM

## 2020-07-27 DIAGNOSIS — R07.9 CHEST PAIN, UNSPECIFIED TYPE: ICD-10-CM

## 2020-07-27 DIAGNOSIS — I10 ESSENTIAL HYPERTENSION: ICD-10-CM

## 2020-07-27 DIAGNOSIS — Z72.0 TOBACCO ABUSE: ICD-10-CM

## 2020-07-27 PROCEDURE — 99214 OFFICE O/P EST MOD 30 MIN: CPT | Performed by: INTERNAL MEDICINE

## 2020-07-27 RX ORDER — NITROGLYCERIN 0.4 MG/1
0.4 TABLET SUBLINGUAL
COMMUNITY
End: 2022-01-24

## 2020-10-12 ENCOUNTER — OFFICE VISIT (OUTPATIENT)
Dept: PULMONOLOGY | Facility: CLINIC | Age: 65
End: 2020-10-12

## 2020-10-12 VITALS
BODY MASS INDEX: 42.62 KG/M2 | HEIGHT: 62 IN | TEMPERATURE: 97.8 F | OXYGEN SATURATION: 95 % | SYSTOLIC BLOOD PRESSURE: 128 MMHG | HEART RATE: 93 BPM | DIASTOLIC BLOOD PRESSURE: 72 MMHG | WEIGHT: 231.6 LBS

## 2020-10-12 DIAGNOSIS — J44.1 CHRONIC OBSTRUCTIVE PULMONARY DISEASE WITH ACUTE EXACERBATION (HCC): Primary | ICD-10-CM

## 2020-10-12 DIAGNOSIS — R49.0 HOARSENESS OF VOICE: ICD-10-CM

## 2020-10-12 PROCEDURE — 99214 OFFICE O/P EST MOD 30 MIN: CPT | Performed by: INTERNAL MEDICINE

## 2020-10-12 RX ORDER — PREDNISONE 10 MG/1
10 TABLET ORAL DAILY
Qty: 50 TABLET | Refills: 0 | Status: SHIPPED | OUTPATIENT
Start: 2020-10-12 | End: 2020-10-12 | Stop reason: SDUPTHER

## 2020-10-12 RX ORDER — PROMETHAZINE HYDROCHLORIDE 25 MG/1
25 TABLET ORAL EVERY 6 HOURS PRN
COMMUNITY
Start: 2020-09-02 | End: 2022-08-08

## 2020-10-12 RX ORDER — PREDNISONE 20 MG/1
TABLET ORAL
COMMUNITY
Start: 2020-09-21 | End: 2022-01-24

## 2020-10-12 RX ORDER — PREDNISONE 10 MG/1
TABLET ORAL
Qty: 50 TABLET | Refills: 0 | Status: SHIPPED | OUTPATIENT
Start: 2020-10-12 | End: 2022-08-08

## 2020-10-12 RX ORDER — GABAPENTIN 600 MG/1
TABLET ORAL
COMMUNITY
Start: 2020-10-06 | End: 2022-07-27

## 2020-10-12 RX ORDER — ASPIRIN 325 MG
600 TABLET ORAL EVERY 12 HOURS
COMMUNITY
Start: 2020-09-22 | End: 2022-08-08

## 2020-10-12 RX ORDER — AZITHROMYCIN 500 MG/1
500 TABLET, FILM COATED ORAL 3 TIMES WEEKLY
Qty: 12 TABLET | Refills: 0 | Status: SHIPPED | OUTPATIENT
Start: 2020-10-12 | End: 2022-01-24

## 2020-10-12 NOTE — PROGRESS NOTES
Quyen Galvan is a 64 y.o. female here for evaluation of   Chief Complaint   Patient presents with   • COPD     6mo checkup   • Hoarse     worsened       Problem list:  1. Mild chronic bronchitis, 3/6/20 FEV1 1.51L 71%  2. SASHA non compliant  3. CAD, [facility] 6/2020 50-60% D1, 30-40% RCA, LVEDP 35  4. Diastolic heart failure  5. Hypertension  6. Osteoarthritis   7. History of lower extremity cellulitis  8. Restless leg syndrome  9. Peripheral neuropathy  10. GERD  11. Cholecystectomy  12. Left wrist open reduction and internal fixation  13. Spine surgery  14. Ovarian cyst removal  15. Childbirth x1  16. Fibromyalgia  17. Hemoglobin A1c 6.3, prediabetes  18. NKDA    History of Present Illness    64-year-old woman, active smoker here for follow-up of COPD.  She has a cough productive of yellow mucoid secretions.  She has received Levaquin, erythromycin, azithromycin and 2 rounds of steroids and still has coughing and wheezing.  Unfortunately she continues to smoke cigarettes though not a pack a day because she is too short of breath.  She reports having a chest x-ray that revealed some healed old rib fractures.  She also had a CT scan of the chest.  These were done at [facility] and I do not have the results.  She has a nebulizer machine but apparently Medicare refused to pay for her nebulizer solution so she has not been able to use any nebulizer.  She is on Trelegy 1 puff daily.  She is miserable and wants this wheezing and coughing to stop.  In addition she is having ongoing sinus drainage.  She was evaluated by cardiology in June and does have noncritical coronary artery disease; 50 to 60% first diagonal, 30 to 40% proximal RCA with an LVEDP of 35 and a left ventricular ejection fraction fraction of 55 to 60%.  She is felt to have significant diastolic dysfunction.  Diuretics were ordered for her elevated end-diastolic pressure.    Review of Systems   HENT: Positive for congestion,  postnasal drip and voice change.    Eyes: Positive for itching.   Respiratory: Positive for cough, shortness of breath and wheezing.    Gastrointestinal: Positive for diarrhea and nausea.   Neurological: Positive for headaches.         Current Outpatient Medications:   •  albuterol (PROVENTIL HFA;VENTOLIN HFA) 108 (90 BASE) MCG/ACT inhaler, Inhale 2 puffs every 4 (four) hours as needed for wheezing., Disp: , Rfl:   •  aspirin 81 MG EC tablet, Take 1 tablet by mouth Daily., Disp: 90 tablet, Rfl: 3  •  atorvastatin (LIPITOR) 20 MG tablet, Take 20 mg by mouth Daily., Disp: , Rfl:   •  bumetanide (BUMEX) 1 MG tablet, Take 1 mg by mouth 2 (Two) Times a Day., Disp: , Rfl:   •  busPIRone (BUSPAR) 5 MG tablet, Take 10 mg by mouth 2 (Two) Times a Day., Disp: , Rfl:   •  Calcium Carbonate-Vit D-Min (CALCIUM 1200 PO), Take 1,200 mg by mouth 2 (Two) Times a Day., Disp: , Rfl:   •  Cholecalciferol (VITAMIN D3) 2000 units tablet, Take 2,000 Units by mouth Daily., Disp: , Rfl:   •  diclofenac (VOLTAREN) 1 % gel gel, Apply 4 g topically to the appropriate area as directed 4 (Four) Times a Day As Needed (feet and knees)., Disp: , Rfl:   •  diclofenac (VOLTAREN) 50 MG EC tablet, Take 50 mg by mouth 2 (Two) Times a Day., Disp: , Rfl:   •  docusate sodium (COLACE) 250 MG capsule, Take 250 mg by mouth 2 (Two) Times a Day., Disp: , Rfl:   •  fexofenadine (ALLEGRA) 180 MG tablet, Take 180 mg by mouth daily., Disp: , Rfl:   •  FLUoxetine (PROzac) 20 MG capsule, Take 20 mg by mouth daily., Disp: , Rfl:   •  fluticasone (FLONASE) 50 MCG/ACT nasal spray, 2 sprays into the nostril(s) as directed by provider Daily As Needed. Administer 2 sprays in each nostril for each dose. , Disp: , Rfl:   •  Fluticasone-Umeclidin-Vilant (Trelegy Ellipta) 100-62.5-25 MCG/INH aerosol powder , Inhale 1 puff Daily., Disp: , Rfl:   •  gabapentin (NEURONTIN) 800 MG tablet, Take 800 mg by mouth 3 (Three) Times a Day., Disp: , Rfl:   •  HYDROcodone-acetaminophen  (NORCO)  MG per tablet, Take 1 tablet by mouth Every 6 (Six) Hours., Disp: , Rfl:   •  hydrOXYzine (ATARAX) 25 MG tablet, Take 25 mg by mouth 4 (Four) Times a Day., Disp: , Rfl:   •  ipratropium-albuterol (DUO-NEB) 0.5-2.5 mg/3 ml nebulizer, Take 3 mL by nebulization 4 (Four) Times a Day. (Patient taking differently: Take 3 mL by nebulization 4 (Four) Times a Day As Needed.), Disp: 360 mL, Rfl: 0  •  losartan (COZAAR) 100 MG tablet, Take 100 mg by mouth Daily., Disp: , Rfl:   •  Melatonin 10 MG tablet, Take 40 mg by mouth Every Night. 4 tabs (40mg total) nightly, Disp: , Rfl:   •  misoprostol (CYTOTEC) 200 MCG tablet, Take 200 mcg by mouth 2 (Two) Times a Day., Disp: , Rfl:   •  Mucus Relief  MG 12 hr tablet, Take 600 mg by mouth Every 12 (Twelve) Hours., Disp: , Rfl:   •  nitroglycerin (NITROSTAT) 0.4 MG SL tablet, Place 0.4 mg under the tongue Every 5 (Five) Minutes As Needed for Chest Pain. Take no more than 3 doses in 15 minutes., Disp: , Rfl:   •  nystatin (MYCOSTATIN) 704227 UNIT/GM cream, Apply  topically to the appropriate area as directed As Needed., Disp: , Rfl:   •  nystatin (MYCOSTATIN) 629955 UNIT/GM powder, Apply  topically to the appropriate area as directed As Needed., Disp: , Rfl:   •  omeprazole (priLOSEC) 40 MG capsule, Take 40 mg by mouth 2 (Two) Times a Day., Disp: , Rfl:   •  potassium chloride (K-DUR,KLOR-CON) 20 MEQ CR tablet, Take 20 mEq by mouth 2 (Two) Times a Day., Disp: , Rfl:   •  predniSONE (DELTASONE) 20 MG tablet, TAKE 2 TABLETS BY MOUTH EVERY DAY FOR 5 DAYS, Disp: , Rfl:   •  primidone (MYSOLINE) 50 MG tablet, Take 1 tablet by mouth Every Night., Disp: 30 tablet, Rfl: 11  •  Probiotic capsule, Take 1 capsule by mouth Daily., Disp: , Rfl:   •  promethazine (PHENERGAN) 25 MG tablet, Take 25 mg by mouth Every 6 (Six) Hours As Needed., Disp: , Rfl:   •  promethazine-dextromethorphan (PROMETHAZINE-DM) 6.25-15 MG/5ML syrup, Take 5 mL by mouth As Needed., Disp: , Rfl:   •   QUEtiapine (SEROquel) 50 MG tablet, Take 50 mg by mouth Every Night., Disp: , Rfl:   •  tiZANidine (ZANAFLEX) 4 MG tablet, Take 4 mg by mouth 3 (three) times a day., Disp: , Rfl:   •  triamcinolone (KENALOG) 0.1 % ointment, Apply  topically to the appropriate area as directed As Needed., Disp: , Rfl:   •  vitamin B-12 (CYANOCOBALAMIN) 2500 MCG sublingual tablet tablet, Place  under the tongue Daily., Disp: , Rfl:   •  vitamin C (ASCORBIC ACID) 500 MG tablet, Take 1,000 mg by mouth Daily., Disp: , Rfl:   •  VITAMIN E PO, Take 1 capsule by mouth Daily., Disp: , Rfl:   •  azithromycin (Zithromax) 500 MG tablet, Take 1 tablet by mouth 3 (Three) Times a Week., Disp: 12 tablet, Rfl: 0  •  gabapentin (NEURONTIN) 600 MG tablet, , Disp: , Rfl:   •  predniSONE (DELTASONE) 10 MG tablet, 4 po daily x5, 3 po dialy x5, 2 po daily x5, 1 podaily x5, Disp: 50 tablet, Rfl: 0    Past Medical History:   Diagnosis Date   • Acid reflux    • Anemia    • Anxiety    • Arthritis    • Bursitis of both hips    • Cellulitis    • Chicken pox    • COPD (chronic obstructive pulmonary disease) (CMS/HCC)    • COPD (chronic obstructive pulmonary disease) (CMS/HCC)    • Depression    • Fatty liver    • Fibromyalgia    • Hyperlipidemia    • Hypertension    • IBS (irritable bowel syndrome)    • Inflammatory arthritis    • Menopause    • Mumps    • Neuropathy    • SASHA (obstructive sleep apnea)    • Osteoarthritis    • Rheumatoid arthritis (CMS/HCC)    • RLS (restless legs syndrome)      Past Surgical History:   Procedure Laterality Date   • CARDIAC CATHETERIZATION     • CARDIAC CATHETERIZATION N/A 6/10/2020    Procedure: Left Heart Cath;  Surgeon: Juan Miguel Cole MD;  Location: [facility] CATH INVASIVE LOCATION;  Service: Cardiovascular;  Laterality: N/A;   • CHOLECYSTECTOMY     • FRACTURE SURGERY      left wrist   • OVARIAN CYST REMOVAL     • SPINE SURGERY       Social History     Socioeconomic History   • Marital status:      Spouse name: Not on  "file   • Number of children: 1   • Years of education: Not on file   • Highest education level: Not on file   Tobacco Use   • Smoking status: Current Every Day Smoker     Packs/day: 0.75     Years: 25.00     Pack years: 18.75     Types: Cigarettes   • Smokeless tobacco: Never Used   Substance and Sexual Activity   • Alcohol use: No     Frequency: Never   • Drug use: No   • Sexual activity: Defer   Social History Narrative    Lives with  in [place].  Independent with all ADLs     Family History   Problem Relation Age of Onset   • Hypertension Father    • COPD Father    • Colon cancer Mother    • Coronary artery disease Brother    • COPD Brother    • Stroke Brother      Blood pressure 128/72, pulse 93, temperature 97.8 °F (36.6 °C), temperature source Temporal, height 157.5 cm (62\"), weight 105 kg (231 lb 9.6 oz), SpO2 95 %.    Physical Exam  Constitutional:       Appearance: Normal appearance.   HENT:      Head: Normocephalic and atraumatic.      Nose:      Comments: masked  Cardiovascular:      Rate and Rhythm: Normal rate and regular rhythm.      Heart sounds: No murmur.   Pulmonary:      Comments: Prolonged expiration with diffuse wheezing  Abdominal:      General: Bowel sounds are normal. There is no distension.      Tenderness: There is no abdominal tenderness.   Musculoskeletal:      Right lower leg: Edema present.      Left lower leg: Edema present.   Skin:     General: Skin is warm and dry.   Neurological:      Mental Status: She is alert and oriented to person, place, and time.           PFTs:  FVC 1.74 L, 62%, FEV1 1.51 L, 71%, ratio 87%, total lung capacity 3.76 L, 89%, residual volume 1.91 L, 116%, diffusion capacity 14.5, 67%, mild obstruction, no restriction, mild diffusion impairment    Radiology:      Lab:    Quyen was seen today for copd and hoarse.    Diagnoses and all orders for this visit:    Chronic obstructive pulmonary disease with acute exacerbation (CMS/HCC)    Hoarseness of " voice    Other orders  -     azithromycin (Zithromax) 500 MG tablet; Take 1 tablet by mouth 3 (Three) Times a Week.  -     Discontinue: predniSONE (DELTASONE) 10 MG tablet; Take 1 tablet by mouth Daily. 4  Po daily x5, 3 po dialy x5, 2 po daily x5, 1 podaily x5     DISP #50        Discussion:     64-year-old woman here for follow-up of COPD.  She has purulent sputum and wheezing on examination consistent with a COPD exacerbation.  However, PFTs when she did not have an acute exacerbation really only reveal mild obstruction.  She does not have severe end-stage COPD.  She did not start smoking till she was in her early 40s.  However with ongoing cough and wheeze it is not helpful to continue smoking it is only irritating all the inflammation.  I again discussed strategies for smoking cessation.  She had radiographic studies done [employer] and reportedly tells me that there was no pneumonia but unfortunately I do not have the films to review.  Her oxygen saturation at rest on room air is 95%.    STOP SMOKING  Azithromycin 500 mg Monday Wednesday Friday for 4 weeks  Try to get her DuoNeb solution paid for and to use it 4 times daily  Slow prednisone taper over 3 weeks  Sinus irrigation, she is reluctant to consider but she does need to get rid of the sinus congestion  Follow-up with me in 6 weeks with repeat spirometry    Clarissa Rich MD

## 2020-11-16 ENCOUNTER — OFFICE VISIT (OUTPATIENT)
Dept: NEUROLOGY | Facility: CLINIC | Age: 65
End: 2020-11-16

## 2020-11-16 VITALS
HEIGHT: 62 IN | WEIGHT: 231.48 LBS | OXYGEN SATURATION: 91 % | TEMPERATURE: 98.2 F | DIASTOLIC BLOOD PRESSURE: 88 MMHG | BODY MASS INDEX: 42.6 KG/M2 | HEART RATE: 70 BPM | SYSTOLIC BLOOD PRESSURE: 150 MMHG

## 2020-11-16 DIAGNOSIS — G25.0 ESSENTIAL TREMOR: Primary | ICD-10-CM

## 2020-11-16 PROCEDURE — 99213 OFFICE O/P EST LOW 20 MIN: CPT | Performed by: PSYCHIATRY & NEUROLOGY

## 2020-11-16 NOTE — PROGRESS NOTES
"Qyuen Galvan    Subjective     CC: memory impairment, tremor    History of Present Illness   Quyen Galvan returns to clinic today with a history of memory impairment and tremor. In terms of memory, she has noted a several year history of forgetuflness and anomia.    In terms of tremor, she has noted a bilateral tremor of the hands, gradually worsening over several years. The tremor has primarily been with intention. Primidone has helped, though she continues to have periods of worsened tremor.    Prior evaluation has included screening blood work  and a head CT  which were unremarkable . She is currently taking primidone 50 mg qhs.    Since her last visit on 12/13/19 her primidone has generally been well tolerated, and has largely controlled her tremor. A CMP/CBC have been largely unremarkable in 6/20.    I have reviewed and confirmed the past family, social and medical history as accurate on 11/16/20.     Review of Systems   Constitutional: Negative.        Objective     /88   Pulse 70   Temp 98.2 °F (36.8 °C) (Temporal)   Ht 157.5 cm (62.01\")   Wt 105 kg (231 lb 7.7 oz)   SpO2 91%   BMI 42.33 kg/m²      Neurologic Exam     Mental Status   Oriented to person, place, and time.   Registration: recalls 3 of 3 objects. Recall at 5 minutes: recalls 2 of 3 objects. Follows 3 step commands.   Attention: normal.   Speech: speech is normal   Level of consciousness: alert  Knowledge: good.   Able to name object. Able to read. Able to repeat. Able to write. Normal comprehension.     Cranial Nerves   Cranial nerves II through XII intact.     Motor Exam   Muscle bulk: normal  Overall muscle tone: normal    Strength   Strength 5/5 throughout.     Sensory Exam   Light touch normal.     Gait, Coordination, and Reflexes     Gait  Gait: (antalgic)    Coordination   Finger to nose coordination: normal    Tremor   Intention tremor: absent      MMSE=29      Assessment/Plan   Diagnoses and all orders for this " visit:    1. Essential tremor (Primary)          Quyen Galvan returns to clinic today with a history of essential tremor. As her symptoms are currently well controlled, and her bloodwork is being monitored locally, it was elected to simply continue on primidone unchanged at 50 mg qhs. She will then follow up in 6 months , or sooner if needed.     I spent 20 minutes face to face with the patient. I   spent 11 minutes of this time counseling and discussing diagnosis, diagnostic testing and management.    As part of this visit I reviewed prior lab results.    Smooth Gracia MD

## 2021-03-22 RX ORDER — ASPIRIN 81 MG/1
TABLET, COATED ORAL
Qty: 90 TABLET | Refills: 3 | Status: SHIPPED | OUTPATIENT
Start: 2021-03-22 | End: 2022-05-06

## 2021-11-05 ENCOUNTER — TELEPHONE (OUTPATIENT)
Dept: CARDIOLOGY | Facility: CLINIC | Age: 66
End: 2021-11-05

## 2021-11-05 NOTE — TELEPHONE ENCOUNTER
Intermediate but nonmodifiable risk, okay to proceed with planned procedure without further cardiac testing.   Procedure start: tunneled dialysis catheter exchange.

## 2021-11-05 NOTE — TELEPHONE ENCOUNTER
PT is scheduled for EGD on Monday 11/8/2021 at [facility] in [place]. . They are requesting cardiac risk assessment.

## 2022-01-24 ENCOUNTER — OFFICE VISIT (OUTPATIENT)
Dept: NEUROLOGY | Facility: CLINIC | Age: 67
End: 2022-01-24

## 2022-01-24 ENCOUNTER — LAB (OUTPATIENT)
Dept: LAB | Facility: HOSPITAL | Age: 67
End: 2022-01-24

## 2022-01-24 DIAGNOSIS — R20.2 NUMBNESS AND TINGLING: Primary | ICD-10-CM

## 2022-01-24 DIAGNOSIS — R20.0 NUMBNESS AND TINGLING: Primary | ICD-10-CM

## 2022-01-24 DIAGNOSIS — R73.9 HYPERGLYCEMIA: ICD-10-CM

## 2022-01-24 LAB
BASOPHILS # BLD AUTO: 0.02 10*3/MM3 (ref 0–0.2)
BASOPHILS NFR BLD AUTO: 0.2 % (ref 0–1.5)
DEPRECATED RDW RBC AUTO: 45 FL (ref 37–54)
EOSINOPHIL # BLD AUTO: 0.01 10*3/MM3 (ref 0–0.4)
EOSINOPHIL NFR BLD AUTO: 0.1 % (ref 0.3–6.2)
ERYTHROCYTE [DISTWIDTH] IN BLOOD BY AUTOMATED COUNT: 13.1 % (ref 12.3–15.4)
FOLATE SERPL-MCNC: >20 NG/ML (ref 4.78–24.2)
HBA1C MFR BLD: 6.55 % (ref 4.8–5.6)
HCT VFR BLD AUTO: 33.4 % (ref 34–46.6)
HGB BLD-MCNC: 11.1 G/DL (ref 12–15.9)
IMM GRANULOCYTES # BLD AUTO: 0.07 10*3/MM3 (ref 0–0.05)
IMM GRANULOCYTES NFR BLD AUTO: 0.6 % (ref 0–0.5)
LYMPHOCYTES # BLD AUTO: 0.88 10*3/MM3 (ref 0.7–3.1)
LYMPHOCYTES NFR BLD AUTO: 8.1 % (ref 19.6–45.3)
MCH RBC QN AUTO: 31.9 PG (ref 26.6–33)
MCHC RBC AUTO-ENTMCNC: 33.2 G/DL (ref 31.5–35.7)
MCV RBC AUTO: 96 FL (ref 79–97)
MONOCYTES # BLD AUTO: 0.38 10*3/MM3 (ref 0.1–0.9)
MONOCYTES NFR BLD AUTO: 3.5 % (ref 5–12)
NEUTROPHILS NFR BLD AUTO: 87.5 % (ref 42.7–76)
NEUTROPHILS NFR BLD AUTO: 9.54 10*3/MM3 (ref 1.7–7)
NRBC BLD AUTO-RTO: 0 /100 WBC (ref 0–0.2)
PLATELET # BLD AUTO: 343 10*3/MM3 (ref 140–450)
PMV BLD AUTO: 9.5 FL (ref 6–12)
RBC # BLD AUTO: 3.48 10*6/MM3 (ref 3.77–5.28)
VIT B12 BLD-MCNC: 1486 PG/ML (ref 211–946)
WBC NRBC COR # BLD: 10.9 10*3/MM3 (ref 3.4–10.8)

## 2022-01-24 PROCEDURE — 82607 VITAMIN B-12: CPT | Performed by: PHYSICIAN ASSISTANT

## 2022-01-24 PROCEDURE — 99214 OFFICE O/P EST MOD 30 MIN: CPT | Performed by: PHYSICIAN ASSISTANT

## 2022-01-24 PROCEDURE — 82746 ASSAY OF FOLIC ACID SERUM: CPT | Performed by: PHYSICIAN ASSISTANT

## 2022-01-24 PROCEDURE — 80053 COMPREHEN METABOLIC PANEL: CPT | Performed by: PHYSICIAN ASSISTANT

## 2022-01-24 PROCEDURE — 85025 COMPLETE CBC W/AUTO DIFF WBC: CPT | Performed by: PHYSICIAN ASSISTANT

## 2022-01-24 PROCEDURE — 84443 ASSAY THYROID STIM HORMONE: CPT | Performed by: PHYSICIAN ASSISTANT

## 2022-01-24 PROCEDURE — 84207 ASSAY OF VITAMIN B-6: CPT | Performed by: PHYSICIAN ASSISTANT

## 2022-01-24 PROCEDURE — 84165 PROTEIN E-PHORESIS SERUM: CPT | Performed by: PHYSICIAN ASSISTANT

## 2022-01-24 PROCEDURE — 36415 COLL VENOUS BLD VENIPUNCTURE: CPT | Performed by: PHYSICIAN ASSISTANT

## 2022-01-24 PROCEDURE — 83036 HEMOGLOBIN GLYCOSYLATED A1C: CPT | Performed by: PHYSICIAN ASSISTANT

## 2022-01-24 RX ORDER — AMLODIPINE BESYLATE 2.5 MG/1
TABLET ORAL
COMMUNITY
Start: 2021-11-04 | End: 2022-08-08

## 2022-01-24 RX ORDER — PRIMIDONE 50 MG/1
TABLET ORAL
COMMUNITY
Start: 2021-12-07 | End: 2022-01-24 | Stop reason: SDUPTHER

## 2022-01-24 RX ORDER — PRIMIDONE 50 MG/1
100 TABLET ORAL NIGHTLY
Qty: 30 TABLET | Refills: 11 | Status: SHIPPED | OUTPATIENT
Start: 2022-01-24 | End: 2022-08-08

## 2022-01-24 RX ORDER — METHOCARBAMOL 500 MG/1
TABLET, FILM COATED ORAL
COMMUNITY
Start: 2022-01-19 | End: 2022-08-08

## 2022-01-24 RX ORDER — SENNOSIDES 8.6 MG
TABLET ORAL
COMMUNITY
Start: 2022-01-18 | End: 2022-08-08

## 2022-01-24 RX ORDER — LEVOCETIRIZINE DIHYDROCHLORIDE 5 MG/1
TABLET, FILM COATED ORAL
COMMUNITY
Start: 2022-01-10 | End: 2022-08-08

## 2022-01-24 RX ORDER — SUCRALFATE 1 G/1
TABLET ORAL
COMMUNITY
Start: 2021-11-10 | End: 2022-08-08

## 2022-01-24 RX ORDER — FAMOTIDINE 20 MG/1
20 TABLET, FILM COATED ORAL 2 TIMES DAILY
COMMUNITY
Start: 2021-11-28

## 2022-01-24 RX ORDER — FLUOXETINE 20 MG/1
TABLET, FILM COATED ORAL
COMMUNITY
Start: 2021-11-12 | End: 2022-01-24

## 2022-01-24 NOTE — PROGRESS NOTES
Subjective         Chief Complaint: tremor      History of Present Illness   Quyen Galvan is a 66 y.o. female who returns to clinic today with a history of memory impairment and tremor. In terms of memory, she has noted a several year history of forgetuflness and anomia.     In terms of tremor, she has noted a bilateral tremor of the hands, gradually worsening over several years. The tremor has primarily been with intention. Primidone has helped, though she continues to have periods of worsened tremor.     Prior evaluation has included screening blood work  and a head CT  which were unremarkable . She is currently taking primidone 50 mg qhs.    Today: Since her last visit in 11/20, she notes that her tremor has worsened. She also reports burning, numbness, and tingling in her feet bilaterally. This has worsened over time. There is associated back pain, for which she is following with pain management. Additionally, she notes numbness in her hands.       I have reviewed and confirmed the past family, social and medical history as accurate on 1/24/22.     Review of Systems   Constitutional: Negative.    HENT: Negative.    Eyes: Negative.    Respiratory: Negative.    Cardiovascular: Negative.    Gastrointestinal: Negative.    Endocrine: Negative.    Genitourinary: Negative.    Musculoskeletal: Negative.    Skin: Negative.    Allergic/Immunologic: Negative.    Hematological: Negative.        Objective     General appearance today is normal.         Physical Exam  Neurological:      Coordination: Finger-Nose-Finger Test normal.      Deep Tendon Reflexes: Strength normal.   Psychiatric:         Speech: Speech normal.          Neurologic Exam     Mental Status   Speech: speech is normal   Level of consciousness: alert  Normal comprehension.     Cranial Nerves   Cranial nerves II through XII intact.     Motor Exam   Muscle bulk: normal  Overall muscle tone: normal    Strength   Strength 5/5 throughout.     Sensory Exam    Right leg light touch: decreased from knee  Left leg light touch: decreased from knee    Gait, Coordination, and Reflexes     Gait  Gait: (antalgic)    Coordination   Finger to nose coordination: normal    Tremor   Resting tremor: absent        Results  MMSE=29 in 11/20      Assessment/Plan   Diagnoses and all orders for this visit:    1. Numbness and tingling (Primary)  -     Folate  -     TSH  -     Vitamin B12  -     Vitamin B6  -     Protein Elec + Interp, Serum  -     CBC & Differential  -     Comprehensive Metabolic Panel    2. Hyperglycemia  -     Hemoglobin A1c    Other orders  -     primidone (MYSOLINE) 50 MG tablet; Take 2 tablets by mouth Every Night.  Dispense: 30 tablet; Refill: 11          Discussion/Summary   Quyen Galvan returns to clinic today for evaluation of essential tremor and possible peripheral neuropathy. This was discussed in detail. I again reviewed her current status and treatment options. It was elected to obtain screening blood work . Next, we will consider obtaining an EMG of the upper and lower extremities. After discussing potential treatment options, it was elected to increase her primidone to 100mg nightly. She will then follow up in 6 months , or sooner if needed.   Total time of visit today: 30 minutes. As part of this visit I discussed the history with the patient . I also discussed diagnosis, diagnostic testing, evaluation, current status, treatment options and management as discussed above.       Antonella Pryor PA-C

## 2022-01-25 LAB
ALBUMIN SERPL-MCNC: 3.8 G/DL (ref 3.5–5.2)
ALBUMIN/GLOB SERPL: 1.5 G/DL
ALP SERPL-CCNC: 90 U/L (ref 39–117)
ALT SERPL W P-5'-P-CCNC: 22 U/L (ref 1–33)
ANION GAP SERPL CALCULATED.3IONS-SCNC: 8 MMOL/L (ref 5–15)
AST SERPL-CCNC: 21 U/L (ref 1–32)
BILIRUB SERPL-MCNC: <0.2 MG/DL (ref 0–1.2)
BUN SERPL-MCNC: 11 MG/DL (ref 8–23)
BUN/CREAT SERPL: 11.6 (ref 7–25)
CALCIUM SPEC-SCNC: 9.2 MG/DL (ref 8.6–10.5)
CHLORIDE SERPL-SCNC: 91 MMOL/L (ref 98–107)
CO2 SERPL-SCNC: 34 MMOL/L (ref 22–29)
CREAT SERPL-MCNC: 0.95 MG/DL (ref 0.57–1)
GFR SERPL CREATININE-BSD FRML MDRD: 59 ML/MIN/1.73
GLOBULIN UR ELPH-MCNC: 2.6 GM/DL
GLUCOSE SERPL-MCNC: 118 MG/DL (ref 65–99)
POTASSIUM SERPL-SCNC: 4.7 MMOL/L (ref 3.5–5.2)
PROT SERPL-MCNC: 6.4 G/DL (ref 6–8.5)
SODIUM SERPL-SCNC: 133 MMOL/L (ref 136–145)
TSH SERPL DL<=0.05 MIU/L-ACNC: 0.81 UIU/ML (ref 0.27–4.2)

## 2022-01-26 LAB
ALBUMIN SERPL ELPH-MCNC: 3.3 G/DL (ref 2.9–4.4)
ALBUMIN/GLOB SERPL: 1.1 {RATIO} (ref 0.7–1.7)
ALPHA1 GLOB SERPL ELPH-MCNC: 0.3 G/DL (ref 0–0.4)
ALPHA2 GLOB SERPL ELPH-MCNC: 1 G/DL (ref 0.4–1)
B-GLOBULIN SERPL ELPH-MCNC: 1 G/DL (ref 0.7–1.3)
GAMMA GLOB SERPL ELPH-MCNC: 0.7 G/DL (ref 0.4–1.8)
GLOBULIN SER CALC-MCNC: 3.1 G/DL (ref 2.2–3.9)
LABORATORY COMMENT REPORT: NORMAL
M PROTEIN SERPL ELPH-MCNC: NORMAL G/DL
PROT PATTERN SERPL ELPH-IMP: NORMAL
PROT SERPL-MCNC: 6.4 G/DL (ref 6–8.5)

## 2022-02-06 LAB — VIT B6 SERPL-MCNC: 134.4 UG/L (ref 2–32.8)

## 2022-02-07 ENCOUNTER — TELEPHONE (OUTPATIENT)
Dept: NEUROLOGY | Facility: CLINIC | Age: 67
End: 2022-02-07

## 2022-02-07 NOTE — TELEPHONE ENCOUNTER
----- Message from Antonella Pryor PA-C sent at 2/7/2022  1:16 PM EST -----  Please let patient know that I reviewed her labs. Her B6 level is quite high. Is she taking a b complex or multivitamin? If so, I would recommend discontinuing and then rechecking the labs in 4-6 weeks. Thanks

## 2022-02-07 NOTE — TELEPHONE ENCOUNTER
Called pt to inform her of lab results. She stated that she wanted to talk to you in regards to her Primidone. She stated that you increased it to 2 a day, and when she did that she started to feel increased tiredness so she went back to once a day. She stated she will try twice a day again if that's what you want her to do, but she stated that she recently had a fall where she broke her wrist, finger, and cut her leg requiring 14 stitches. She is unsure if the increased dose was the cause of her fall. She stated she lost her balance.

## 2022-04-27 ENCOUNTER — TELEPHONE (OUTPATIENT)
Dept: NEUROLOGY | Facility: CLINIC | Age: 67
End: 2022-04-27

## 2022-04-27 NOTE — TELEPHONE ENCOUNTER
Caller: Quyen Galvan    Relationship: Self    Best call back number: [phone]    What medications are you currently taking:   Current Outpatient Medications on File Prior to Visit   Medication Sig Dispense Refill   • albuterol (PROVENTIL HFA;VENTOLIN HFA) 108 (90 BASE) MCG/ACT inhaler Inhale 2 puffs every 4 (four) hours as needed for wheezing.     • amLODIPine (NORVASC) 2.5 MG tablet      • Aspirin Low Dose 81 MG EC tablet TAKE 1 TABLET BY MOUTH EVERY DAY 90 tablet 3   • atorvastatin (LIPITOR) 20 MG tablet Take 20 mg by mouth Daily.     • bumetanide (BUMEX) 1 MG tablet Take 1 mg by mouth 2 (Two) Times a Day.     • busPIRone (BUSPAR) 5 MG tablet Take 10 mg by mouth 2 (Two) Times a Day.     • Calcium Carbonate-Vit D-Min (CALCIUM 1200 PO) Take 1,200 mg by mouth 2 (Two) Times a Day.     • Cholecalciferol (VITAMIN D3) 2000 units tablet Take 2,000 Units by mouth Daily.     • [facility] Senna 8.6 MG tablet      • diclofenac (VOLTAREN) 1 % gel gel Apply 4 g topically to the appropriate area as directed 4 (Four) Times a Day As Needed (feet and knees).     • diclofenac (VOLTAREN) 50 MG EC tablet Take 50 mg by mouth 2 (Two) Times a Day.     • docusate sodium (COLACE) 250 MG capsule Take 250 mg by mouth 2 (Two) Times a Day.     • famotidine (PEPCID) 20 MG tablet      • FLUoxetine (PROzac) 20 MG capsule Take 20 mg by mouth daily.     • fluticasone (FLONASE) 50 MCG/ACT nasal spray 2 sprays into the nostril(s) as directed by provider Daily As Needed. Administer 2 sprays in each nostril for each dose.      • Fluticasone-Umeclidin-Vilant (Trelegy Ellipta) 100-62.5-25 MCG/INH aerosol powder  Inhale 1 puff Daily.     • gabapentin (NEURONTIN) 600 MG tablet      • HYDROcodone-acetaminophen (NORCO)  MG per tablet Take 1 tablet by mouth Every 6 (Six) Hours.     • hydrOXYzine (ATARAX) 25 MG tablet Take 25 mg by mouth 4 (Four) Times a Day.     • ipratropium-albuterol (DUO-NEB) 0.5-2.5 mg/3 ml nebulizer Take 3 mL by nebulization 4  (Four) Times a Day. (Patient taking differently: Take 3 mL by nebulization 4 (Four) Times a Day As Needed.) 360 mL 0   • levocetirizine (XYZAL) 5 MG tablet      • losartan (COZAAR) 100 MG tablet Take 100 mg by mouth Daily.     • Melatonin 10 MG tablet Take 40 mg by mouth Every Night. 4 tabs (40mg total) nightly     • methocarbamol (ROBAXIN) 500 MG tablet      • misoprostol (CYTOTEC) 200 MCG tablet Take 200 mcg by mouth 2 (Two) Times a Day.     • Mucus Relief  MG 12 hr tablet Take 600 mg by mouth Every 12 (Twelve) Hours.     • nystatin (MYCOSTATIN) 803585 UNIT/GM cream Apply  topically to the appropriate area as directed As Needed.     • nystatin (MYCOSTATIN) 314808 UNIT/GM powder Apply  topically to the appropriate area as directed As Needed.     • omeprazole (priLOSEC) 40 MG capsule Take 40 mg by mouth 2 (Two) Times a Day.     • potassium chloride (K-DUR,KLOR-CON) 20 MEQ CR tablet Take 20 mEq by mouth 2 (Two) Times a Day.     • predniSONE (DELTASONE) 10 MG tablet 4 po daily x5, 3 po dialy x5, 2 po daily x5, 1 podaily x5 50 tablet 0   • primidone (MYSOLINE) 50 MG tablet Take 2 tablets by mouth Every Night. 30 tablet 11   • Probiotic capsule Take 1 capsule by mouth Daily.     • promethazine (PHENERGAN) 25 MG tablet Take 25 mg by mouth Every 6 (Six) Hours As Needed.     • promethazine-dextromethorphan (PROMETHAZINE-DM) 6.25-15 MG/5ML syrup Take 5 mL by mouth As Needed.     • QUEtiapine (SEROquel) 50 MG tablet Take 50 mg by mouth Every Night.     • sucralfate (CARAFATE) 1 g tablet      • triamcinolone (KENALOG) 0.1 % ointment Apply  topically to the appropriate area as directed As Needed.     • vitamin B-12 (CYANOCOBALAMIN) 2500 MCG sublingual tablet tablet Place  under the tongue Daily.     • vitamin C (ASCORBIC ACID) 500 MG tablet Take 1,000 mg by mouth Daily.     • VITAMIN E PO Take 1 capsule by mouth Daily.       No current facility-administered medications on file prior to visit.        Which medication are  you concerned about: PRIMIDONE    Who prescribed you this medication: BANDAR BOOTH    What are your concerns: PT IS CALLING TO STATE THAT OVER THE PAST FEW DAYS, HER TREMORS HAVE GOTTEN REALLY BAD.  PT ALSO REPORTS THAT SHE HAD A FALL 3 DAYS AGO AS WELL.  PT IS OKAY, BUT HER HIP IS SORE.  PT ALSO REPORTS THAT HER LEGS HAVE BEEN BUCKLING UNDER HER, AS WELL.  SHE STATES THAT THE BUCKLING IS NOT WHAT CAUSED HER MOST RECENT FALL, THOUGH.    PT IS CALLING TO SEE IF THERE IS ANYTHING ELSE BANDAR CAN PRESCRIBE FOR THE TREMOR THAT IS STRONGER AND  WILL NOT MAKE HER AS TIRED DURING THE DAY.  PT REPORTS THAT SHE COULD NOT TAKE THE 2 TABLETS OF PRIMIDONE DUE TO EXCESSIVE SLEEPINESS DURING THE DAY.

## 2022-04-27 NOTE — TELEPHONE ENCOUNTER
If she is taking primidone 50 mg tablet, 2 tablets at bedtime, tell her to increase it to 3 tablets at bedtime for 1 week and then 4 tablets at bedtime after that.  This will help.

## 2022-04-28 NOTE — TELEPHONE ENCOUNTER
Okay.  I understand.  In that case, just continue with 2 pills for now for another 2 to 4 weeks.  Body will eventually get used to the dose and then it can be increased further.

## 2022-04-28 NOTE — TELEPHONE ENCOUNTER
PT RETURNED CALL SHE CAN'T GET INTO HER V.M SO WILL HAVE TO TALK TO NILDA OR M.A TRANS CALL TO DENEEN

## 2022-04-28 NOTE — TELEPHONE ENCOUNTER
"I relayed Dr. Cantrell's instructions to the Pt. She stated that since the 2 pills are \"messing up her equilibrium\" and she is sleepy all day after taking the 2 tablets at night she is worried a further increase will only make the problem worse. She is wondering if there are any other options.  "

## 2022-04-29 NOTE — TELEPHONE ENCOUNTER
If there is no history of kidney stones, we can try adding topiramate. If she is agreeable, I will send it in. Thanks

## 2022-05-03 RX ORDER — TOPIRAMATE 25 MG/1
25 TABLET ORAL NIGHTLY
Qty: 30 TABLET | Refills: 11 | Status: SHIPPED | OUTPATIENT
Start: 2022-05-03

## 2022-05-06 RX ORDER — ASPIRIN 81 MG/1
TABLET, COATED ORAL
Qty: 90 TABLET | Refills: 3 | Status: SHIPPED | OUTPATIENT
Start: 2022-05-06

## 2022-05-10 ENCOUNTER — TELEPHONE (OUTPATIENT)
Dept: NEUROLOGY | Facility: CLINIC | Age: 67
End: 2022-05-10

## 2022-05-10 NOTE — TELEPHONE ENCOUNTER
Caller: Quyen Galvan    Relationship: Self    Best call back number: [phone]    Who are you requesting to speak with (clinical staff, provider,  specific staff member):   JANEY BOOTH    What was the call regarding:   PT UPDATE  PT WAS HOSPITALIZED ON 5-6-22 UNTIL 5-8-22 AT [facility] DUE TO A FALL. PT STATED THIS ISSUE IS WITH HER KNEES AND HER FEET. PT STATED THEY DID AN MRI AND CT BOTH.    PT WAS TOLD TO F/U WITH HER NEUROLOGIST.     PT WAS ALREADY SCHEDULED FOR F/U ON 7-27-22. TRIED TO RESCHEDULE THE APPT BUT THE SOONEST APPT WAS IN June AND IT WAS A MORNING APPT WHICH THE PT CAN'T DO.    Do you require a callback: YES, PLEASE IF SOONER APPT IN THE AFTERNOON BECOMES AVAILABLE.

## 2022-05-12 NOTE — TELEPHONE ENCOUNTER
We can place her on my cancellation list. Can you try to obtain records from her admission? Thanks!

## 2022-06-03 ENCOUNTER — TELEPHONE (OUTPATIENT)
Dept: NEUROLOGY | Facility: CLINIC | Age: 67
End: 2022-06-03

## 2022-06-03 NOTE — TELEPHONE ENCOUNTER
I have called the provider. She is restarting TPM. Patient will call if symptoms are not improved in 1-2 weeks, and we can considering restarting a low dose of primidone at that point as well as placing her on the cancellation list. Thanks!

## 2022-06-03 NOTE — TELEPHONE ENCOUNTER
Marianna Mcmillan NP from [facility] called regarding Pt. Around May 13 Pt had an unintentional drug overdose and was admitted to the hospital. They believe it was due to GI issues and she had gastroparesis and falls. They stopped all medications upon admission to help stabilize her and instructed she resume meds upon dismissal and transfer to [facility]. She has started to have tremors and Marianna is wanting to start her back on some treatments but wanted to verify what Rx and dosage she should start the Pt at due to the recent OD. The Pt was wanting to start on the Topamax if just one.  Marianna can be reached at [phone]

## 2022-07-18 RX ORDER — PRIMIDONE 50 MG/1
100 TABLET ORAL NIGHTLY
Qty: 180 TABLET | Refills: 1 | OUTPATIENT
Start: 2022-07-18

## 2022-07-27 ENCOUNTER — OFFICE VISIT (OUTPATIENT)
Dept: NEUROLOGY | Facility: CLINIC | Age: 67
End: 2022-07-27

## 2022-07-27 VITALS — DIASTOLIC BLOOD PRESSURE: 80 MMHG | SYSTOLIC BLOOD PRESSURE: 140 MMHG

## 2022-07-27 DIAGNOSIS — R20.0 NUMBNESS AND TINGLING: ICD-10-CM

## 2022-07-27 DIAGNOSIS — G25.0 ESSENTIAL TREMOR: Primary | ICD-10-CM

## 2022-07-27 DIAGNOSIS — R20.2 NUMBNESS AND TINGLING: ICD-10-CM

## 2022-07-27 PROCEDURE — 99214 OFFICE O/P EST MOD 30 MIN: CPT | Performed by: PHYSICIAN ASSISTANT

## 2022-07-27 RX ORDER — GABAPENTIN 400 MG/1
400 CAPSULE ORAL 3 TIMES DAILY
Qty: 90 CAPSULE | Refills: 5 | Status: SHIPPED | OUTPATIENT
Start: 2022-07-27 | End: 2022-09-15

## 2022-07-27 NOTE — PROGRESS NOTES
Subjective         Chief Complaint: tremor     History of Present Illness   Quyen Galvan is a 66 y.o. female who returns to clinic today with a history of memory impairment and tremor. In terms of memory, she has noted a several year history of forgetuflness and anomia.     In terms of tremor, she has noted a bilateral tremor of the hands, gradually worsening over several years. The tremor has primarily been with intention. Primidone has helped, though she continues to have periods of worsened tremor.     Prior evaluation has included screening blood work  and a head CT which were unremarkable. Primidone caused sedation.    Today: Since her last visit in 1/22, she notes that her tremor is improved with TPM 25mg nightly and is manageable. She was hospitalized at [facility] in 5/22 for speech difficulty and leg weakness. MRI of the brain at this time was reportedly unremarkable for any acute abnormalities. She has since noted balance impairment for which she is working with PT through home health. She reports increased pain, numbness and tingling in her upper and lower extremities for which GBP is helpful.      I have reviewed and confirmed the past family, social and medical history as accurate on 7/27/22.     Review of Systems   Constitutional: Negative.    HENT: Negative.    Eyes: Negative.    Respiratory: Negative.    Cardiovascular: Negative.    Gastrointestinal: Negative.    Endocrine: Negative.    Genitourinary: Negative.    Musculoskeletal: Negative.    Skin: Negative.    Allergic/Immunologic: Negative.    Hematological: Negative.        Objective     /80     General appearance today is normal.     Physical Exam  Neurological:      Coordination: Finger-Nose-Finger Test and Heel to Shin Test normal.      Deep Tendon Reflexes: Strength normal.   Psychiatric:         Speech: Speech normal.          Neurologic Exam     Mental Status   Speech: speech is normal   Level of consciousness: alert  Normal  comprehension.     Cranial Nerves   Cranial nerves II through XII intact.     Motor Exam   Muscle bulk: normal  Overall muscle tone: normal    Strength   Strength 5/5 throughout.     Gait, Coordination, and Reflexes     Gait  Gait: (in wheelchair)    Coordination   Finger to nose coordination: normal  Heel to shin coordination: normal    Tremor   Resting tremor: absent            Assessment & Plan   Diagnoses and all orders for this visit:    1. Essential tremor (Primary)    2. Numbness and tingling  -     gabapentin (NEURONTIN) 400 MG capsule; Take 1 capsule by mouth 3 (Three) Times a Day.  Dispense: 90 capsule; Refill: 5          Discussion/Summary   Quyen Galvan returns to clinic today for evaluation of essential tremor and peripheral neuropathy. I again reviewed her current status and treatment options. After discussing potential treatment options, it was elected to increase her GBP to 400mg TID and continue on TPM unchanged. She will then follow up in 6 months , or sooner if needed.   Total time of visit today: 30 minutes. As part of this visit I reviewed radiology results, reviewed outside records and obtained additional history from the family which is incorporated in the HPI. I also discussed diagnosis, prognosis, diagnostic testing, evaluation, current status, treatment options and management as discussed above.         Antonella Pryro PA-C

## 2022-08-08 ENCOUNTER — OFFICE VISIT (OUTPATIENT)
Dept: CARDIOLOGY | Facility: CLINIC | Age: 67
End: 2022-08-08

## 2022-08-08 VITALS
OXYGEN SATURATION: 94 % | SYSTOLIC BLOOD PRESSURE: 110 MMHG | DIASTOLIC BLOOD PRESSURE: 58 MMHG | BODY MASS INDEX: 30.88 KG/M2 | WEIGHT: 167.8 LBS | HEIGHT: 62 IN | HEART RATE: 97 BPM

## 2022-08-08 DIAGNOSIS — I10 ESSENTIAL HYPERTENSION: ICD-10-CM

## 2022-08-08 DIAGNOSIS — R07.9 CHEST PAIN, UNSPECIFIED TYPE: ICD-10-CM

## 2022-08-08 DIAGNOSIS — I25.118 CORONARY ARTERY DISEASE OF NATIVE ARTERY OF NATIVE HEART WITH STABLE ANGINA PECTORIS: Primary | ICD-10-CM

## 2022-08-08 PROCEDURE — 99214 OFFICE O/P EST MOD 30 MIN: CPT | Performed by: INTERNAL MEDICINE

## 2022-08-08 PROCEDURE — 93000 ELECTROCARDIOGRAM COMPLETE: CPT | Performed by: INTERNAL MEDICINE

## 2022-08-08 RX ORDER — LIDOCAINE 50 MG/G
1 PATCH TOPICAL EVERY 24 HOURS
COMMUNITY

## 2022-08-08 RX ORDER — METOPROLOL SUCCINATE 25 MG/1
25 TABLET, EXTENDED RELEASE ORAL DAILY
COMMUNITY

## 2022-08-08 RX ORDER — MEGESTROL ACETATE 40 MG/ML
SUSPENSION ORAL DAILY
COMMUNITY
End: 2023-02-03

## 2022-08-08 RX ORDER — METOCLOPRAMIDE 5 MG/1
5 TABLET ORAL
COMMUNITY
End: 2023-02-03

## 2022-08-08 RX ORDER — ONDANSETRON 4 MG/1
4 TABLET, FILM COATED ORAL EVERY 8 HOURS PRN
COMMUNITY

## 2022-08-08 RX ORDER — PANTOPRAZOLE SODIUM 40 MG/1
40 TABLET, DELAYED RELEASE ORAL DAILY
COMMUNITY
End: 2023-02-03

## 2022-08-08 NOTE — PROGRESS NOTES
OFFICE VISIT  NOTE  [facility] CARDIOLOGY      Name: Quyen Galvan    Date: 2022  MRN:  [id number]  :  1955      REFERRING/PRIMARY PROVIDER:  Akshat Dawson MD     Chief Complaint   Patient presents with   • Coronary artery disease of native artery of native heart wi       HPI: Quyen Galvan is a 66 y.o. female who presents today for CAD.  History of cath  showing 50 to 60% diagonal disease otherwise normal LAD, circumflex and RCA.  Echo 2020 showed EF normal, mild RV enlargement and mild biatrial enlargement.  Admitted to [facility] 2022 for stroke.  Follows with neurology.  History of COPD, tobacco abuse, hypertension, hyperlipidemia.  Also admitted to [facility] May for acute hypoxic respiratory failure, records deficient, she states it was due to gastroparesis and COPD but no cardiac issues at that time.  She denies chest pain currently.    Past Medical History:   Diagnosis Date   • Acid reflux    • Anemia    • Anxiety    • Arthritis    • Bursitis of both hips    • Cellulitis    • Chicken pox    • COPD (chronic obstructive pulmonary disease) ([facility])    • COPD (chronic obstructive pulmonary disease) ([facility])    • Depression    • Fatty liver    • Fibromyalgia    • Hyperlipidemia    • Hypertension    • IBS (irritable bowel syndrome)    • Inflammatory arthritis    • Menopause    • Mumps    • Neuropathy    • SASHA (obstructive sleep apnea)    • Osteoarthritis    • Respiratory failure ([facility])     2022- [place]   • Rheumatoid arthritis ([facility])    • RLS (restless legs syndrome)        Past Surgical History:   Procedure Laterality Date   • CARDIAC CATHETERIZATION     • CARDIAC CATHETERIZATION N/A 6/10/2020    Procedure: Left Heart Cath;  Surgeon: Juan Miguel Cole MD;  Location: [facility] INVASIVE LOCATION;  Service: Cardiovascular;  Laterality: N/A;   • CHOLECYSTECTOMY     • FRACTURE SURGERY      left wrist   • OVARIAN CYST REMOVAL     • SPINE SURGERY         Social  History     Socioeconomic History   • Marital status:    • Number of children: 1   Tobacco Use   • Smoking status: Current Every Day Smoker     Packs/day: 0.75     Years: 25.00     Pack years: 18.75     Types: Cigarettes   • Smokeless tobacco: Never Used   Vaping Use   • Vaping Use: Never used   Substance and Sexual Activity   • Alcohol use: No   • Drug use: No   • Sexual activity: Defer       Family History   Problem Relation Age of Onset   • Hypertension Father    • COPD Father    • Colon cancer Mother    • Coronary artery disease Brother    • COPD Brother    • Stroke Brother         ROS:   Constitutional no fever,  no weight loss   Skin no rash, no subcutaneous nodules   Otolaryngeal no difficulty swallowing   Cardiovascular See HPI   Pulmonary no cough, no sputum production   Gastrointestinal no constipation, no diarrhea   Genitourinary no dysuria, no hematuria   Hematologic no easy bruisability, no abnormal bleeding   Musculoskeletal no muscle pain   Neurologic no dizziness, no falls         No Known Allergies      Current Outpatient Medications:   •  albuterol (PROVENTIL HFA;VENTOLIN HFA) 108 (90 BASE) MCG/ACT inhaler, Inhale 2 puffs every 4 (four) hours as needed for wheezing., Disp: , Rfl:   •  atorvastatin (LIPITOR) 20 MG tablet, Take 20 mg by mouth Daily., Disp: , Rfl:   •  bumetanide (BUMEX) 1 MG tablet, Take 1 mg by mouth Daily., Disp: , Rfl:   •  docusate sodium (COLACE) 250 MG capsule, Take 250 mg by mouth 2 (Two) Times a Day., Disp: , Rfl:   •  famotidine (PEPCID) 20 MG tablet, Take 20 mg by mouth 2 (Two) Times a Day., Disp: , Rfl:   •  Fluticasone-Umeclidin-Vilant (TRELEGY) 100-62.5-25 MCG/INH inhaler, Inhale 1 puff Daily., Disp: , Rfl:   •  gabapentin (NEURONTIN) 400 MG capsule, Take 1 capsule by mouth 3 (Three) Times a Day., Disp: 90 capsule, Rfl: 5  •  HYDROcodone-acetaminophen (NORCO)  MG per tablet, Take 1 tablet by mouth Every 6 (Six) Hours., Disp: , Rfl:   •  lidocaine (LIDODERM)  "5 %, Place 1 patch on the skin as directed by provider Daily. Remove & Discard patch within 12 hours or as directed by MD, Disp: , Rfl:   •  linaclotide (LINZESS) 290 MCG capsule capsule, Take 290 mcg by mouth Every Morning Before Breakfast., Disp: , Rfl:   •  megestrol (MEGACE) 40 MG/ML suspension, Take  by mouth Daily., Disp: , Rfl:   •  metoclopramide (REGLAN) 5 MG tablet, Take 5 mg by mouth 4 (Four) Times a Day Before Meals & at Bedtime., Disp: , Rfl:   •  metoprolol succinate XL (TOPROL-XL) 25 MG 24 hr tablet, Take 25 mg by mouth Daily., Disp: , Rfl:   •  misoprostol (CYTOTEC) 200 MCG tablet, Take 200 mcg by mouth 2 (Two) Times a Day., Disp: , Rfl:   •  omeprazole (priLOSEC) 40 MG capsule, Take 40 mg by mouth 2 (Two) Times a Day., Disp: , Rfl:   •  ondansetron (ZOFRAN) 4 MG tablet, Take 4 mg by mouth Every 8 (Eight) Hours As Needed for Nausea or Vomiting., Disp: , Rfl:   •  pantoprazole (PROTONIX) 40 MG EC tablet, Take 40 mg by mouth Daily., Disp: , Rfl:   •  potassium chloride (K-DUR,KLOR-CON) 20 MEQ CR tablet, Take 20 mEq by mouth 2 (Two) Times a Day., Disp: , Rfl:   •  Probiotic capsule, Take 1 capsule by mouth Daily., Disp: , Rfl:   •  topiramate (TOPAMAX) 25 MG tablet, Take 1 tablet by mouth Every Night., Disp: 30 tablet, Rfl: 11  •  vitamin B-12 (CYANOCOBALAMIN) 2500 MCG sublingual tablet tablet, Place  under the tongue Daily., Disp: , Rfl:   •  Aspirin Low Dose 81 MG EC tablet, TAKE 1 TABLET BY MOUTH EVERY DAY, Disp: 90 tablet, Rfl: 3  •  losartan (COZAAR) 100 MG tablet, Take 100 mg by mouth Daily., Disp: , Rfl:     Vitals:    08/08/22 1402   BP: 110/58   BP Location: Left arm   Patient Position: Sitting   Cuff Size: Adult   Pulse: 97   SpO2: 94%   Weight: 76.1 kg (167 lb 12.8 oz)   Height: 157.5 cm (62\")     Body mass index is 30.69 kg/m².    PHYSICAL EXAM:    General Appearance:   · well developed  · well nourished  Neck:  · thyroid not enlarged  · supple  Respiratory:  · no respiratory " distress  · normal breath sounds  · no rales  Cardiovascular:  · no jugular venous distention  · regular rhythm  · apical impulse normal  · S1 normal, S2 normal  · no S3, no S4   · no murmur  · no rub, no thrill  · carotid pulses normal; no bruit  · pedal pulses normal  · lower extremity edema: none    Skin:   warm, dry    RESULTS:     ECG 12 Lead    Date/Time: 8/8/2022 2:29 PM  Performed by: Juan Miguel Cole MD  Authorized by: Juan Miguel Cole MD   Comparison: compared with previous ECG from 2/25/2020  Similar to previous ECG  Rhythm: sinus rhythm  Rate: normal  QRS axis: normal    Clinical impression: normal ECG                  Labs:  Lab Results   Component Value Date    CHOL 154 06/10/2020    TRIG 102 06/10/2020    HDL 63 (H) 06/10/2020    LDL 71 06/10/2020    AST 21 01/24/2022    ALT 22 01/24/2022     Lab Results   Component Value Date    HGBA1C 6.55 (H) 01/24/2022     No components found for: CREATINININE  eGFR Non  Amer   Date Value Ref Range Status   01/24/2022 59 (L) >60 mL/min/1.73 Final   06/10/2020 67 >60 mL/min/1.73 Final   09/04/2019 57 (L) >60 mL/min/1.73 Final         ASSESSMENT:  Problem List Items Addressed This Visit        Cardiac and Vasculature    Essential hypertension    Relevant Medications    metoprolol succinate XL (TOPROL-XL) 25 MG 24 hr tablet    Chest pain    Overview     02/2020 Holter  · 46 SVT, longest 14 sec  · No evidence or Afib or Aflutter  · Avg HR 74         Coronary artery disease of native artery of native heart with stable angina pectoris (HCC) - Primary    Overview     6/10/2020: Cardiac catheterization showed 50 to 60% ostial first diagonal lesion, diagonal is a medium caliber vessel, otherwise normal LAD, circumflex, and RCA.  LVEDP elevated at 35 mmHg, LVEF 55-60% with normal wall motion.         Relevant Medications    metoprolol succinate XL (TOPROL-XL) 25 MG 24 hr tablet          PLAN:    1.    CAD  Cardiac catheterization 06/10/20 revealed 50-60% ostial  first diagonal lesion, otherwise normal LAD, circumflex, and RCA. LVEF 55-60%  No chest pain currently.     Resume aspirin 81 mg daily  Continue atorvastatin  The patient was advised to call 911 if chest pain worsens or persist despite nitroglycerin or rest.     2.  Abnormal echocardiogram:  I reviewed the echocardiogram, there is mild mitral enlargement and mild RV enlargement  Continue pulmonary follow-up for severe COPD.  14-day Holter 02/2020 revealed 46 runs of SVT longest was 14 seconds.      3.  Tobacco abuse:  We discussed the importance of complete tobacco cessation, the patient is not ready to stop at this time.     4.  Paroxysmal SVT:  Relatively asymptomatic currently  No medical therapy necessary at this time     5.  Chronic diastolic heart failure:  LVEDP 35 mmHg 6-2020 during cardiac catheterization  Continue Bumex 1 mg daily  Renal function normal  Increase exercise and monitor blood pressure closely    Advance Care Planning   ACP discussion was held with the patient during this visit. Patient does not have an advance directive, information provided.       Thank you for the opportunity to share in the care of your patient; please do not hesitate to call me with any questions.     Juan Miguel Cole MD, [facility]  Office: [phone] [address] [place] [address]    08/08/22

## 2022-09-15 ENCOUNTER — TELEPHONE (OUTPATIENT)
Dept: NEUROLOGY | Facility: CLINIC | Age: 67
End: 2022-09-15

## 2022-09-15 DIAGNOSIS — R20.2 NUMBNESS AND TINGLING: Primary | ICD-10-CM

## 2022-09-15 DIAGNOSIS — R20.0 NUMBNESS AND TINGLING: Primary | ICD-10-CM

## 2022-09-15 RX ORDER — GABAPENTIN 600 MG/1
600 TABLET ORAL 3 TIMES DAILY
Qty: 90 TABLET | Refills: 5 | Status: SHIPPED | OUTPATIENT
Start: 2022-09-15 | End: 2023-02-02

## 2022-09-15 NOTE — TELEPHONE ENCOUNTER
MEDICATION CONCERNS    Caller: Quyen Galvan    Relationship: Self    Best call back number: [phone]    Preferred pharmacy: [facility]/PHARMACY #[id number] - [address] [phone] [facility] [phone]     What medications are you currently taking:   Current Outpatient Medications on File Prior to Visit   Medication Sig Dispense Refill   • albuterol (PROVENTIL HFA;VENTOLIN HFA) 108 (90 BASE) MCG/ACT inhaler Inhale 2 puffs every 4 (four) hours as needed for wheezing.     • Aspirin Low Dose 81 MG EC tablet TAKE 1 TABLET BY MOUTH EVERY DAY 90 tablet 3   • atorvastatin (LIPITOR) 20 MG tablet Take 20 mg by mouth Daily.     • bumetanide (BUMEX) 1 MG tablet Take 1 mg by mouth Daily.     • docusate sodium (COLACE) 250 MG capsule Take 250 mg by mouth 2 (Two) Times a Day.     • famotidine (PEPCID) 20 MG tablet Take 20 mg by mouth 2 (Two) Times a Day.     • Fluticasone-Umeclidin-Vilant (TRELEGY) 100-62.5-25 MCG/INH inhaler Inhale 1 puff Daily.     • gabapentin (NEURONTIN) 400 MG capsule Take 1 capsule by mouth 3 (Three) Times a Day. 90 capsule 5   • HYDROcodone-acetaminophen (NORCO)  MG per tablet Take 1 tablet by mouth Every 6 (Six) Hours.     • lidocaine (LIDODERM) 5 % Place 1 patch on the skin as directed by provider Daily. Remove & Discard patch within 12 hours or as directed by MD     • linaclotide (LINZESS) 290 MCG capsule capsule Take 290 mcg by mouth Every Morning Before Breakfast.     • megestrol (MEGACE) 40 MG/ML suspension Take  by mouth Daily.     • metoclopramide (REGLAN) 5 MG tablet Take 5 mg by mouth 4 (Four) Times a Day Before Meals & at Bedtime.     • metoprolol succinate XL (TOPROL-XL) 25 MG 24 hr tablet Take 25 mg by mouth Daily.     • misoprostol (CYTOTEC) 200 MCG tablet Take 200 mcg by mouth 2 (Two) Times a Day.     • omeprazole (priLOSEC) 40 MG capsule Take 40 mg by mouth 2 (Two) Times a Day.     • ondansetron (ZOFRAN) 4 MG tablet Take 4 mg by  mouth Every 8 (Eight) Hours As Needed for Nausea or Vomiting.     • pantoprazole (PROTONIX) 40 MG EC tablet Take 40 mg by mouth Daily.     • potassium chloride (K-DUR,KLOR-CON) 20 MEQ CR tablet Take 20 mEq by mouth 2 (Two) Times a Day.     • Probiotic capsule Take 1 capsule by mouth Daily.     • topiramate (TOPAMAX) 25 MG tablet Take 1 tablet by mouth Every Night. 30 tablet 11   • vitamin B-12 (CYANOCOBALAMIN) 2500 MCG sublingual tablet tablet Place  under the tongue Daily.       No current facility-administered medications on file prior to visit.     Which medication are you concerned about: gabapentin (NEURONTIN) 400 MG capsule- Take 1 capsule by mouth 3 (Three) Times a Day.    Who prescribed you this medication: BANDAR BOOTH PA-C    What are your concerns: PT DOES NOT FEEL 1 400MG CAPSULE, 3X DAILY IS WORKING WELL FOR THE NUMBNESS AND TINGLING IN HER FEET. PT STATES SHE HAS BEEN TAKING 1 600MG CAPSULE, 3X DAILY INSTEAD (FROM AN OLD PRESCRIPTION). PT STATES THE 600MG CAPSULES HAVE NOT HELPED COMPLETELY BUT HAVE HELPED MORE THAN THE 400MG CAPSULES.    PT IS COMPLETELY OUT OF THE 400MG CAPSULES AND IS RUNNING LOW ON THE 600MG CAPSULES. PT ASKS HOW BANDAR BOOTH PA-C WOULD LIKE TO INCREASE THE PRESCRIPTION AND IF UPDATED SCRIPT CAN BE SENT TO THE PHARMACY.    PLEASE REVIEW AND ADVISE.

## 2023-02-02 ENCOUNTER — OFFICE VISIT (OUTPATIENT)
Dept: NEUROLOGY | Facility: CLINIC | Age: 68
End: 2023-02-02
Payer: MEDICARE

## 2023-02-02 VITALS
BODY MASS INDEX: 27.49 KG/M2 | HEIGHT: 62 IN | WEIGHT: 149.4 LBS | HEART RATE: 66 BPM | OXYGEN SATURATION: 94 % | DIASTOLIC BLOOD PRESSURE: 70 MMHG | SYSTOLIC BLOOD PRESSURE: 122 MMHG

## 2023-02-02 DIAGNOSIS — F17.200 SMOKER: ICD-10-CM

## 2023-02-02 DIAGNOSIS — G25.0 ESSENTIAL TREMOR: ICD-10-CM

## 2023-02-02 DIAGNOSIS — G62.9 POLYNEUROPATHY: Primary | ICD-10-CM

## 2023-02-02 PROCEDURE — 99215 OFFICE O/P EST HI 40 MIN: CPT | Performed by: PSYCHIATRY & NEUROLOGY

## 2023-02-02 RX ORDER — IBUPROFEN 400 MG/1
1 TABLET ORAL 3 TIMES DAILY
COMMUNITY
Start: 2022-11-24

## 2023-02-02 RX ORDER — GABAPENTIN 600 MG/1
600 TABLET ORAL 3 TIMES DAILY
Qty: 90 TABLET | Refills: 5 | Status: SHIPPED | OUTPATIENT
Start: 2023-02-02 | End: 2023-03-27

## 2023-02-02 RX ORDER — FAMOTIDINE 40 MG/1
TABLET, FILM COATED ORAL
COMMUNITY
Start: 2022-12-14 | End: 2023-02-03

## 2023-02-02 RX ORDER — FLUTICASONE PROPIONATE 50 MCG
2 SPRAY, SUSPENSION (ML) NASAL DAILY
COMMUNITY
Start: 2023-01-21

## 2023-02-02 RX ORDER — OMEPRAZOLE 20 MG/1
CAPSULE, DELAYED RELEASE ORAL
COMMUNITY
Start: 2023-02-01 | End: 2023-02-03

## 2023-02-02 RX ORDER — NYSTATIN 100000 U/G
CREAM TOPICAL
COMMUNITY
Start: 2023-01-28

## 2023-02-02 RX ORDER — DOCUSATE SODIUM 100 MG/1
CAPSULE, LIQUID FILLED ORAL
COMMUNITY
Start: 2023-01-03

## 2023-02-02 RX ORDER — BLACK COHOSH ROOT EXTRACT 80 MG
2 CAPSULE ORAL DAILY
COMMUNITY
Start: 2023-01-24

## 2023-02-02 NOTE — PROGRESS NOTES
Subjective:    CC: Quyen Galvan is seen today in consultation at the request of KEVIN Blas for Essential Tremor       Initial visit-patient is a 67-year-old female with a history of hypertension, CAD, COPD, smoker presents with tremors and paresthesias in hands and feet.  Patient states she started having tremors in her hands as well as her head a few years ago.  The tremors are mainly while carrying out tasks such as holding objects or writing.  She denies any family history of tremors.  Antonella had previously started her on primidone but did not help therefore she stopped taking it.  She was then started on Topamax 25 mg nightly which has helped a lot.  She also has started to have tingling and numbness in her hands and feet feet a few years ago.  She was diagnosed with neuropathy and started on gabapentin that was gradually increased to 600 mg 3 times a day.  She still has some symptoms in her feet despite taking the dose.  She had blood work which showed a normal TSH, A1c was 6.5 (but she is on no meds for diabetes), SPEP was normal, B12 and B6 were elevated and GFR was 59.  She had a CT scan of the head in 2019 that was unremarkable.  She does smoke about 1 pack of cigarettes daily.  Of note-I reviewed Antonella's notes as follows-    66 y.o. female who returns to clinic today with a history of memory impairment and tremor. In terms of memory, she has noted a several year history of forgetuflness and anomia.     In terms of tremor, she has noted a bilateral tremor of the hands, gradually worsening over several years. The tremor has primarily been with intention. Primidone has helped, though she continues to have periods of worsened tremor.   Prior evaluation has included screening blood work  and a head CT which were unremarkable. Primidone caused sedation.  Last visit: Since her last visit in 1/22, she notes that her tremor is improved with TPM 25mg nightly and is manageable. She was hospitalized at  [facility] in 5/22 for speech difficulty and leg weakness. MRI of the brain at this time was reportedly unremarkable for any acute abnormalities. She has since noted balance impairment for which she is working with PT through home health. She reports increased pain, numbness and tingling in her upper and lower extremities for which GBP is helpful.      The following portions of the patient's history were reviewed today and updated as of 02/02/2023  : allergies, current medications, past family history, past medical history, past social history, past surgical history and problem list  These document will be scanned to patient's chart.      Current Outpatient Medications:   •  albuterol (PROVENTIL HFA;VENTOLIN HFA) 108 (90 BASE) MCG/ACT inhaler, Inhale 2 puffs every 4 (four) hours as needed for wheezing., Disp: , Rfl:   •  bumetanide (BUMEX) 1 MG tablet, Take 1 mg by mouth Daily., Disp: , Rfl:   •  docusate sodium (COLACE) 100 MG capsule, TAKE 1 CAPSULE BY MOUTH TWICE A DAY FOR 30 DAYS, Disp: , Rfl:   •  famotidine (PEPCID) 20 MG tablet, Take 20 mg by mouth 2 (Two) Times a Day., Disp: , Rfl:   •  fluticasone (FLONASE) 50 MCG/ACT nasal spray, 2 sprays by Each Nare route Daily., Disp: , Rfl:   •  Fluticasone-Umeclidin-Vilant (TRELEGY) 100-62.5-25 MCG/INH inhaler, Inhale 1 puff Daily., Disp: , Rfl:   •  HYDROcodone-acetaminophen (NORCO)  MG per tablet, Take 1 tablet by mouth Every 6 (Six) Hours., Disp: , Rfl:   •  Lactobacillus (Acidophilus Probiotic) 100 MG capsule, Take 2 tablets by mouth Daily., Disp: , Rfl:   •  lidocaine (LIDODERM) 5 %, Place 1 patch on the skin as directed by provider Daily. Remove & Discard patch within 12 hours or as directed by MD, Disp: , Rfl:   •  metoprolol succinate XL (TOPROL-XL) 25 MG 24 hr tablet, Take 25 mg by mouth Daily., Disp: , Rfl:   •  nystatin (MYCOSTATIN) 581758 UNIT/GM cream, , Disp: , Rfl:   •  omeprazole (priLOSEC) 40 MG capsule, Take 40 mg by mouth 2 (Two) Times a Day., Disp:  , Rfl:   •  ondansetron (ZOFRAN) 4 MG tablet, Take 4 mg by mouth Every 8 (Eight) Hours As Needed for Nausea or Vomiting., Disp: , Rfl:   •  potassium chloride (K-DUR,KLOR-CON) 20 MEQ CR tablet, Take 20 mEq by mouth 2 (Two) Times a Day., Disp: , Rfl:   •  topiramate (TOPAMAX) 25 MG tablet, Take 1 tablet by mouth Every Night., Disp: 30 tablet, Rfl: 11  •  vitamin B-12 (CYANOCOBALAMIN) 2500 MCG sublingual tablet tablet, Place  under the tongue Daily., Disp: , Rfl:   •  Aspirin Low Dose 81 MG EC tablet, TAKE 1 TABLET BY MOUTH EVERY DAY, Disp: 90 tablet, Rfl: 3  •  atorvastatin (LIPITOR) 20 MG tablet, Take 20 mg by mouth Daily., Disp: , Rfl:   •  docusate sodium (COLACE) 250 MG capsule, Take 250 mg by mouth 2 (Two) Times a Day., Disp: , Rfl:   •  gabapentin (Neurontin) 600 MG tablet, Take 1 tablet by mouth 3 (Three) Times a Day., Disp: 90 tablet, Rfl: 5  •  ibuprofen (ADVIL,MOTRIN) 400 MG tablet, Take 1 tablet by mouth 3 (Three) Times a Day., Disp: , Rfl:   •  linaclotide (LINZESS) 290 MCG capsule capsule, Take 290 mcg by mouth Every Morning Before Breakfast., Disp: , Rfl:   •  misoprostol (CYTOTEC) 200 MCG tablet, Take 200 mcg by mouth 2 (Two) Times a Day., Disp: , Rfl:   •  Probiotic capsule, Take 1 capsule by mouth Daily., Disp: , Rfl:    Past Medical History:   Diagnosis Date   • Acid reflux    • Anemia    • Anxiety    • Arthritis    • Bursitis of both hips    • Cellulitis    • Chicken pox    • COPD (chronic obstructive pulmonary disease) (HCC)    • COPD (chronic obstructive pulmonary disease) (HCC)    • Depression    • Fatty liver    • Fibromyalgia    • Hyperlipidemia    • Hypertension    • IBS (irritable bowel syndrome)    • Inflammatory arthritis    • Menopause    • Mumps    • Neuropathy    • SASHA (obstructive sleep apnea)    • Osteoarthritis    • Respiratory failure (HCC)     5/12/2022- [place]   • Rheumatoid arthritis (HCC)    • RLS (restless legs syndrome)       Past Surgical History:   Procedure Laterality  "Date   • CARDIAC CATHETERIZATION     • CARDIAC CATHETERIZATION N/A 6/10/2020    Procedure: Left Heart Cath;  Surgeon: Juan Miguel Cole MD;  Location: [facility] CATH INVASIVE LOCATION;  Service: Cardiovascular;  Laterality: N/A;   • CHOLECYSTECTOMY     • FRACTURE SURGERY      left wrist   • OVARIAN CYST REMOVAL     • SPINE SURGERY        Family History   Problem Relation Age of Onset   • Hypertension Father    • COPD Father    • Colon cancer Mother    • Coronary artery disease Brother    • COPD Brother    • Stroke Brother       Social History     Socioeconomic History   • Marital status:    • Number of children: 1   Tobacco Use   • Smoking status: Every Day     Packs/day: 0.75     Years: 25.00     Pack years: 18.75     Types: Cigarettes   • Smokeless tobacco: Never   Vaping Use   • Vaping Use: Never used   Substance and Sexual Activity   • Alcohol use: No   • Drug use: No   • Sexual activity: Defer     Review of Systems   Neurological: Positive for tremors and numbness.   All other systems reviewed and are negative.      Objective:    /70   Pulse 66   Ht 157.5 cm (62\")   Wt 67.8 kg (149 lb 6.4 oz)   SpO2 94%   BMI 27.33 kg/m²     Neurology Exam:    General apperance: NAD.     Mental status: Alert, awake and oriented to time place and person.    Recent and Remote memory: Intact.    Attention span and Concentration: Normal.     Language and Speech: Intact- No dysarthria.    Fluency, Naming , Repitition and Comprehension:  Intact    Cranial Nerves:   CN II: Visual fields are full. Intact. Fundi - Normal, No papillederma, Pupils - NICOLE  CN III, IV and VI: Extraocular movements are intact. Normal saccades.   CN V: Facial sensation is intact.   CN VII: Muscles of facial expression reveal no asymmetry. Intact.   CN VIII: Hearing is intact. Whispered voice intact.   CN IX and X: Palate elevates symmetrically. Intact  CN XI: Shoulder shrug is intact.   CN XII: Tongue is midline without evidence of atrophy or " fasciculation.     Ophthalmoscopic exam of optic disc-normal    Motor: Fine postural tremors but no kinetic tremors noted.  She had an extremely mild tremor while drawing a Andino's wheel but not while writing.  No micrographia noted    Right UE muscle strength 5/5. Normal tone.     Left UE muscle strength 5/5. Normal tone.      Right LE muscle strength5/5. Normal tone.     Left LE muscle strength 5/5. Normal tone.      Sensory: Reduced to pinprick in both hands till above wrist and in both lower extremities to the level of ankle with markedly reduced vibration and minimally reduced temperature in    DTRs: 2+ bilaterally in upper and lower extremities.    Babinski: Negative bilaterally.    Co-ordination: Normal finger-to-nose, heel to shin B/L.    Rhomberg: Positive    Gait: Kyphotic posture as well as slightly broad-based gait    Cardiovascular: Regular rate and rhythm without murmur, gallop or rub.    Assessment and Plan:  1. Polyneuropathy  Her neuropathy is most likely due to her prediabetes/diabetes.  Last A1c of 6.5  I have told her to speak to her PCP about starting her on metformin  She should continue gabapentin 600 mg 3 times daily that we will send to [facility] as she wants to switch from her current pharmacy  - gabapentin (Neurontin) 600 MG tablet; Take 1 tablet by mouth 3 (Three) Times a Day.  Dispense: 90 tablet; Refill: 5    2. Essential tremor  Currently on Topamax 25 mg nightly which is helping  Previously tried and failed primidone    3. Smoker  She smokes 1 pack of cigarettes daily.  I have counseled her to gradually taper and quit smoking.  I offered her nicotine patches but she does not want them right now       Return in about 5 months (around 7/2/2023).     I spent over 40 minutes with the patient face to face out of which over 50% (30 minutes) was spent in management, instructions and education.     Emiliana Murdock MD I admit

## 2023-03-24 ENCOUNTER — TELEPHONE (OUTPATIENT)
Dept: NEUROLOGY | Facility: CLINIC | Age: 68
End: 2023-03-24
Payer: MEDICARE

## 2023-03-24 NOTE — TELEPHONE ENCOUNTER
Caller: JONH    Ino call back number: [phone]    What was the call regarding: PT WANTED TO KNOW IF THERE IS ANOTHER RX THAT SHE  CAN TAKE ALONG WITH HER GABAPENTIN THAT WILL HELP WITH NEUROP , IN HER FEET?      Do you require a callback: YES    PLEASE ADVISE     [facility] - [address] [phone] [place] [phone] FX  [phone]   electronic

## 2023-03-27 DIAGNOSIS — G62.9 POLYNEUROPATHY: Primary | ICD-10-CM

## 2023-03-27 RX ORDER — GABAPENTIN 800 MG/1
800 TABLET ORAL 3 TIMES DAILY
Qty: 90 TABLET | Refills: 5 | Status: SHIPPED | OUTPATIENT
Start: 2023-03-27 | End: 2024-03-26

## 2023-08-07 ENCOUNTER — OFFICE VISIT (OUTPATIENT)
Dept: NEUROLOGY | Facility: CLINIC | Age: 68
End: 2023-08-07
Payer: MEDICARE

## 2023-08-07 VITALS
WEIGHT: 168.8 LBS | HEART RATE: 65 BPM | OXYGEN SATURATION: 94 % | HEIGHT: 62 IN | SYSTOLIC BLOOD PRESSURE: 108 MMHG | BODY MASS INDEX: 31.06 KG/M2 | DIASTOLIC BLOOD PRESSURE: 64 MMHG

## 2023-08-07 DIAGNOSIS — G25.0 ESSENTIAL TREMOR: ICD-10-CM

## 2023-08-07 DIAGNOSIS — G62.9 POLYNEUROPATHY: Primary | ICD-10-CM

## 2023-08-07 DIAGNOSIS — F17.200 SMOKER: ICD-10-CM

## 2023-08-07 PROCEDURE — 3074F SYST BP LT 130 MM HG: CPT | Performed by: PSYCHIATRY & NEUROLOGY

## 2023-08-07 PROCEDURE — 99214 OFFICE O/P EST MOD 30 MIN: CPT | Performed by: PSYCHIATRY & NEUROLOGY

## 2023-08-07 PROCEDURE — 3078F DIAST BP <80 MM HG: CPT | Performed by: PSYCHIATRY & NEUROLOGY

## 2023-08-07 PROCEDURE — 1159F MED LIST DOCD IN RCRD: CPT | Performed by: PSYCHIATRY & NEUROLOGY

## 2023-08-07 PROCEDURE — 1160F RVW MEDS BY RX/DR IN RCRD: CPT | Performed by: PSYCHIATRY & NEUROLOGY

## 2023-08-07 RX ORDER — GABAPENTIN 800 MG/1
800 TABLET ORAL 3 TIMES DAILY
Qty: 90 TABLET | Refills: 5 | Status: SHIPPED | OUTPATIENT
Start: 2023-08-07 | End: 2024-08-06

## 2023-08-07 RX ORDER — TOPIRAMATE 25 MG/1
TABLET ORAL
Qty: 90 TABLET | Refills: 5 | Status: SHIPPED | OUTPATIENT
Start: 2023-08-07

## 2023-08-07 RX ORDER — PHENOL 1.4 %
600 AEROSOL, SPRAY (ML) MUCOUS MEMBRANE DAILY
COMMUNITY

## 2023-08-07 RX ORDER — MELATONIN
1000 DAILY
COMMUNITY

## 2023-08-07 RX ORDER — UREA 10 %
LOTION (ML) TOPICAL
COMMUNITY

## 2023-08-07 NOTE — PROGRESS NOTES
Subjective:    CC: Quyen Galvan is seen today for tremors/neuropathy    Current visit-patient states that her neuropathy symptoms including numbness and pain have worsened in both her hands and feet despite increasing her gabapentin to 800 mg 3 times daily.  Her A1c is much better controlled now at 5.4.  Her tremors are also worse.  She continues to take Topamax 25 mg nightly.      Initial visit-patient is a 67-year-old female with a history of hypertension, CAD, COPD, smoker presents with tremors and paresthesias in hands and feet.  Patient states she started having tremors in her hands as well as her head a few years ago.  The tremors are mainly while carrying out tasks such as holding objects or writing.  She denies any family history of tremors.  Antonella had previously started her on primidone but did not help therefore she stopped taking it.  She was then started on Topamax 25 mg nightly which has helped a lot.  She also has started to have tingling and numbness in her hands and feet feet a few years ago.  She was diagnosed with neuropathy and started on gabapentin that was gradually increased to 600 mg 3 times a day.  She still has some symptoms in her feet despite taking the dose.  She had blood work which showed a normal TSH, A1c was 6.5 (but she is on no meds for diabetes), SPEP was normal, B12 and B6 were elevated and GFR was 59.  She had a CT scan of the head in 2019 that was unremarkable.  She does smoke about 1 pack of cigarettes daily.  Of note-I reviewed Antonella's notes     The following portions of the patient's history were reviewed today and updated as of 02/02/2023  : allergies, current medications, past family history, past medical history, past social history, past surgical history and problem list  These document will be scanned to patient's chart.      Current Outpatient Medications:     albuterol (PROVENTIL HFA;VENTOLIN HFA) 108 (90 BASE) MCG/ACT inhaler, Inhale 2 puffs Every 4 (Four)  Hours As Needed for Wheezing., Disp: , Rfl:     Aspirin Low Dose 81 MG EC tablet, TAKE 1 TABLET BY MOUTH EVERY DAY, Disp: 90 tablet, Rfl: 3    atorvastatin (LIPITOR) 20 MG tablet, Take 1 tablet by mouth Daily., Disp: , Rfl:     B Complex Vitamins (B COMPLEX-B12 PO), Take  by mouth., Disp: , Rfl:     bumetanide (BUMEX) 1 MG tablet, Take 1 tablet by mouth Daily., Disp: , Rfl:     calcium carbonate (OS-NORTH) 600 MG tablet, Take 1 tablet by mouth Daily., Disp: , Rfl:     Cholecalciferol 25 MCG (1000 UT) tablet, Take 1 tablet by mouth Daily., Disp: , Rfl:     docusate sodium (COLACE) 100 MG capsule, TAKE 1 CAPSULE BY MOUTH TWICE A DAY FOR 30 DAYS, Disp: , Rfl:     docusate sodium (COLACE) 250 MG capsule, Take 1 capsule by mouth 2 (Two) Times a Day., Disp: , Rfl:     famotidine (PEPCID) 20 MG tablet, Take 1 tablet by mouth 2 (Two) Times a Day., Disp: , Rfl:     ferrous sulfate 140 (45 Fe) MG tablet controlled-release tablet, Take  by mouth Daily With Breakfast., Disp: , Rfl:     fluticasone (FLONASE) 50 MCG/ACT nasal spray, 2 sprays by Each Nare route Daily., Disp: , Rfl:     Fluticasone-Umeclidin-Vilant (TRELEGY) 100-62.5-25 MCG/INH inhaler, Inhale 1 puff Daily., Disp: , Rfl:     gabapentin (Neurontin) 800 MG tablet, Take 1 tablet by mouth 3 (Three) Times a Day., Disp: 90 tablet, Rfl: 5    HYDROcodone-acetaminophen (NORCO)  MG per tablet, Take 1 tablet by mouth Every 6 (Six) Hours., Disp: , Rfl:     ibuprofen (ADVIL,MOTRIN) 400 MG tablet, Take 1 tablet by mouth 3 (Three) Times a Day., Disp: , Rfl:     Lactobacillus (Acidophilus Probiotic) 100 MG capsule, Take 2 capsules by mouth Daily., Disp: , Rfl:     lidocaine (LIDODERM) 5 %, Place 1 patch on the skin as directed by provider Daily. Remove & Discard patch within 12 hours or as directed by MD, Disp: , Rfl:     metoprolol succinate XL (TOPROL-XL) 25 MG 24 hr tablet, Take 1 tablet by mouth Daily., Disp: , Rfl:     nystatin (MYCOSTATIN) 719481 UNIT/GM cream, , Disp: ,  Rfl:     omeprazole (priLOSEC) 40 MG capsule, Take 1 capsule by mouth 2 (Two) Times a Day., Disp: , Rfl:     ondansetron (ZOFRAN) 4 MG tablet, Take 1 tablet by mouth Every 8 (Eight) Hours As Needed for Nausea or Vomiting., Disp: , Rfl:     potassium chloride (K-DUR,KLOR-CON) 20 MEQ CR tablet, Take 1 tablet by mouth 3 (Three) Times a Day., Disp: , Rfl:     Probiotic capsule, Take 1 capsule by mouth Daily., Disp: , Rfl:     topiramate (Topamax) 25 MG tablet, Take 1 tab later in the morning and 2 tablets at night, Disp: 90 tablet, Rfl: 5   Past Medical History:   Diagnosis Date    Acid reflux     Anemia     Anxiety     Arthritis     Bursitis of both hips     Cellulitis     Chicken pox     COPD (chronic obstructive pulmonary disease)     COPD (chronic obstructive pulmonary disease)     Depression     Fatty liver     Fibromyalgia     Hyperlipidemia     Hypertension     IBS (irritable bowel syndrome)     Inflammatory arthritis     Menopause     Mumps     Neuropathy     SASHA (obstructive sleep apnea)     Osteoarthritis     Respiratory failure     5/12/2022- [place]    Rheumatoid arthritis     RLS (restless legs syndrome)       Past Surgical History:   Procedure Laterality Date    CARDIAC CATHETERIZATION      CARDIAC CATHETERIZATION N/A 6/10/2020    Procedure: Left Heart Cath;  Surgeon: Juan Miguel Cole MD;  Location: [facility] CATH INVASIVE LOCATION;  Service: Cardiovascular;  Laterality: N/A;    CHOLECYSTECTOMY      FRACTURE SURGERY      left wrist    OVARIAN CYST REMOVAL      SPINE SURGERY        Family History   Problem Relation Age of Onset    Hypertension Father     COPD Father     Colon cancer Mother     Coronary artery disease Brother     COPD Brother     Stroke Brother       Social History     Socioeconomic History    Marital status:     Number of children: 1   Tobacco Use    Smoking status: Every Day     Packs/day: 0.75     Years: 25.00     Pack years: 18.75     Types: Cigarettes     Passive exposure:  "Current    Smokeless tobacco: Never   Vaping Use    Vaping Use: Never used   Substance and Sexual Activity    Alcohol use: No    Drug use: No    Sexual activity: Defer     Review of Systems   Neurological:  Positive for tremors and numbness.   All other systems reviewed and are negative.    Objective:    /64   Pulse 65   Ht 157.5 cm (62\")   Wt 76.6 kg (168 lb 12.8 oz)   SpO2 94%   BMI 30.87 kg/mý     Neurology Exam:    General apperance: NAD.     Mental status: Alert, awake and oriented to time place and person.    Recent and Remote memory: Intact.    Attention span and Concentration: Normal.     Language and Speech: Intact- No dysarthria.    Fluency, Naming , Repitition and Comprehension:  Intact    Cranial Nerves:   CN II: Visual fields are full. Intact. Fundi - Normal, No papillederma, Pupils - NICOLE  CN III, IV and VI: Extraocular movements are intact. Normal saccades.   CN V: Facial sensation is intact.   CN VII: Muscles of facial expression reveal no asymmetry. Intact.   CN VIII: Hearing is intact. Whispered voice intact.   CN IX and X: Palate elevates symmetrically. Intact  CN XI: Shoulder shrug is intact.   CN XII: Tongue is midline without evidence of atrophy or fasciculation.     Ophthalmoscopic exam of optic disc-normal    Motor: Fine postural tremors but no kinetic tremors noted.  She had an extremely mild tremor while drawing a Andino's wheel but not while writing.  No micrographia noted    Right UE muscle strength 5/5. Normal tone.     Left UE muscle strength 5/5. Normal tone.      Right LE muscle strength5/5. Normal tone.     Left LE muscle strength 5/5. Normal tone.      Sensory: Reduced to pinprick in both hands till above wrist and in both lower extremities to the level of ankle with markedly reduced vibration and minimally reduced temperature in    DTRs: 2+ bilaterally in upper and lower extremities.    Babinski: Negative bilaterally.    Co-ordination: Normal finger-to-nose, heel to shin " B/L.    Rhomberg: Positive    Gait: Kyphotic posture as well as slightly broad-based gait    Cardiovascular: Regular rate and rhythm without murmur, gallop or rub.    Assessment and Plan:  1. Polyneuropathy  Her neuropathy is most likely due to her prediabetes/diabetes.  Last A1c of 5.4, GFR was 59  I will defer increasing her dose of gabapentin due to her CKD.  She can continue 800 mg 3 times daily  Hopefully the increase in Topamax will help with her symptoms but if it does not I will start her on low doses of Cymbalta  We also gave her a prescription for diabetic shoes  - gabapentin (Neurontin) 600 MG tablet; Take 1 tablet by mouth 3 (Three) Times a Day.  Dispense: 90 tablet; Refill: 5    2. Essential tremor  Previously tried and failed primidone  I will gradually increase her Topamax to 25/50 mg    3. Smoker  She smokes 1 pack of cigarettes daily.  I have once again counseled her to cut back on her smoking       Return in about 3 months (around 11/7/2023).     I spent over 25 minutes with the patient face to face out of which over 50% (20 minutes) was spent in management, instructions and education.     Emiliana Murdock MD I admit

## 2023-09-07 ENCOUNTER — TELEPHONE (OUTPATIENT)
Dept: NEUROLOGY | Facility: CLINIC | Age: 68
End: 2023-09-07
Payer: MEDICARE

## 2023-09-07 RX ORDER — ASPIRIN 81 MG/1
81 TABLET ORAL DAILY
Qty: 90 TABLET | Refills: 3 | Status: SHIPPED | OUTPATIENT
Start: 2023-09-07

## 2023-09-07 NOTE — TELEPHONE ENCOUNTER
Caller: JONH Mckinnon call back number: [phone]     What was the call regarding: PT SAID YOU HAD HER TAKE 800 MG OF ALPHA LIPOIC   IT HELPED IN MORNING BUT AT NIGHT FEET AND LEGS START TINGLING AGAIN, WANTED TO KNOW IF CAN TAKE THAT AT NIGHT AS WELL?     Is it okay if the provider responds through MyChart: NO     CALL PT .     PLEASE ADVISE

## 2023-12-04 ENCOUNTER — OFFICE VISIT (OUTPATIENT)
Dept: NEUROLOGY | Facility: CLINIC | Age: 68
End: 2023-12-04
Payer: MEDICARE

## 2023-12-04 VITALS
HEIGHT: 62 IN | SYSTOLIC BLOOD PRESSURE: 128 MMHG | BODY MASS INDEX: 31.65 KG/M2 | HEART RATE: 73 BPM | DIASTOLIC BLOOD PRESSURE: 68 MMHG | OXYGEN SATURATION: 93 % | WEIGHT: 172 LBS

## 2023-12-04 DIAGNOSIS — G62.9 POLYNEUROPATHY: Primary | ICD-10-CM

## 2023-12-04 DIAGNOSIS — G25.0 ESSENTIAL TREMOR: ICD-10-CM

## 2023-12-04 DIAGNOSIS — M54.18 RIGHT SACRAL RADICULOPATHY: ICD-10-CM

## 2023-12-04 PROCEDURE — 3074F SYST BP LT 130 MM HG: CPT | Performed by: PSYCHIATRY & NEUROLOGY

## 2023-12-04 PROCEDURE — 99214 OFFICE O/P EST MOD 30 MIN: CPT | Performed by: PSYCHIATRY & NEUROLOGY

## 2023-12-04 PROCEDURE — 1160F RVW MEDS BY RX/DR IN RCRD: CPT | Performed by: PSYCHIATRY & NEUROLOGY

## 2023-12-04 PROCEDURE — 1159F MED LIST DOCD IN RCRD: CPT | Performed by: PSYCHIATRY & NEUROLOGY

## 2023-12-04 PROCEDURE — 3078F DIAST BP <80 MM HG: CPT | Performed by: PSYCHIATRY & NEUROLOGY

## 2023-12-04 RX ORDER — PRIMIDONE 50 MG/1
50 TABLET ORAL NIGHTLY
Qty: 30 TABLET | Refills: 5 | Status: SHIPPED | OUTPATIENT
Start: 2023-12-04

## 2023-12-04 NOTE — PROGRESS NOTES
Subjective:    CC: Quyen Galvan is seen today for tremors/neuropathy    Current visit-patient states that her neuropathy symptoms are well controlled since she started alpha lipoic acid supplements in addition to gabapentin 800 mg 3 times daily.  Her last A1c was once again below 6.  She has however started to have severe pain on the outer aspect of her right foot where she finds it difficult to even put the slightest pressure.  Also has low back pain.  For her tremors she increased her Topamax to 25/50 mg but it has not helped at all.  She is also having word finding difficulties now.     Last visit-patient states that her neuropathy symptoms including numbness and pain have worsened in both her hands and feet despite increasing her gabapentin to 800 mg 3 times daily.  Her A1c is much better controlled now at 5.4.  Her tremors are also worse.  She continues to take Topamax 25 mg nightly.      Initial visit-patient is a 67-year-old female with a history of hypertension, CAD, COPD, smoker presents with tremors and paresthesias in hands and feet.  Patient states she started having tremors in her hands as well as her head a few years ago.  The tremors are mainly while carrying out tasks such as holding objects or writing.  She denies any family history of tremors.  Antonella had previously started her on primidone but did not help therefore she stopped taking it.  She was then started on Topamax 25 mg nightly which has helped a lot.  She also has started to have tingling and numbness in her hands and feet feet a few years ago.  She was diagnosed with neuropathy and started on gabapentin that was gradually increased to 600 mg 3 times a day.  She still has some symptoms in her feet despite taking the dose.  She had blood work which showed a normal TSH, A1c was 6.5 (but she is on no meds for diabetes), SPEP was normal, B12 and B6 were elevated and GFR was 59.  She had a CT scan of the head in 2019 that was unremarkable.   She does smoke about 1 pack of cigarettes daily.  Of note-I reviewed Antonella's notes     The following portions of the patient's history were reviewed today and updated as of 02/02/2023  : allergies, current medications, past family history, past medical history, past social history, past surgical history and problem list  These document will be scanned to patient's chart.      Current Outpatient Medications:     albuterol (PROVENTIL HFA;VENTOLIN HFA) 108 (90 BASE) MCG/ACT inhaler, Inhale 2 puffs Every 4 (Four) Hours As Needed for Wheezing., Disp: , Rfl:     aspirin (Aspirin Low Dose) 81 MG EC tablet, Take 1 tablet by mouth Daily., Disp: 90 tablet, Rfl: 3    atorvastatin (LIPITOR) 20 MG tablet, Take 1 tablet by mouth Daily., Disp: , Rfl:     B Complex Vitamins (B COMPLEX-B12 PO), Take  by mouth., Disp: , Rfl:     bumetanide (BUMEX) 1 MG tablet, Take 1 tablet by mouth Daily., Disp: , Rfl:     calcium carbonate (OS-NORTH) 600 MG tablet, Take 1 tablet by mouth Daily., Disp: , Rfl:     Cholecalciferol 25 MCG (1000 UT) tablet, Take 1 tablet by mouth Daily., Disp: , Rfl:     docusate sodium (COLACE) 100 MG capsule, TAKE 1 CAPSULE BY MOUTH TWICE A DAY FOR 30 DAYS, Disp: , Rfl:     docusate sodium (COLACE) 250 MG capsule, Take 1 capsule by mouth 2 (Two) Times a Day., Disp: , Rfl:     famotidine (PEPCID) 20 MG tablet, Take 1 tablet by mouth 2 (Two) Times a Day., Disp: , Rfl:     ferrous sulfate 140 (45 Fe) MG tablet controlled-release tablet, Take  by mouth Daily With Breakfast., Disp: , Rfl:     fluticasone (FLONASE) 50 MCG/ACT nasal spray, 2 sprays by Each Nare route Daily., Disp: , Rfl:     Fluticasone-Umeclidin-Vilant (TRELEGY) 100-62.5-25 MCG/INH inhaler, Inhale 1 puff Daily., Disp: , Rfl:     gabapentin (Neurontin) 800 MG tablet, Take 1 tablet by mouth 3 (Three) Times a Day., Disp: 90 tablet, Rfl: 5    HYDROcodone-acetaminophen (NORCO)  MG per tablet, Take 1 tablet by mouth Every 6 (Six) Hours., Disp: , Rfl:      ibuprofen (ADVIL,MOTRIN) 400 MG tablet, Take 1 tablet by mouth 3 (Three) Times a Day., Disp: , Rfl:     Lactobacillus (Acidophilus Probiotic) 100 MG capsule, Take 2 capsules by mouth Daily., Disp: , Rfl:     lidocaine (LIDODERM) 5 %, Place 1 patch on the skin as directed by provider Daily. Remove & Discard patch within 12 hours or as directed by MD, Disp: , Rfl:     metoprolol succinate XL (TOPROL-XL) 25 MG 24 hr tablet, Take 1 tablet by mouth Daily., Disp: , Rfl:     nystatin (MYCOSTATIN) 136524 UNIT/GM cream, , Disp: , Rfl:     omeprazole (priLOSEC) 40 MG capsule, Take 1 capsule by mouth 2 (Two) Times a Day., Disp: , Rfl:     ondansetron (ZOFRAN) 4 MG tablet, Take 1 tablet by mouth Every 8 (Eight) Hours As Needed for Nausea or Vomiting., Disp: , Rfl:     potassium chloride (K-DUR,KLOR-CON) 20 MEQ CR tablet, Take 1 tablet by mouth 3 (Three) Times a Day., Disp: , Rfl:     primidone (MYSOLINE) 50 MG tablet, Take 1 tablet by mouth Every Night., Disp: 30 tablet, Rfl: 5    Probiotic capsule, Take 1 capsule by mouth Daily., Disp: , Rfl:    Past Medical History:   Diagnosis Date    Acid reflux     Anemia     Anxiety     Arthritis     Bursitis of both hips     Cellulitis     Chicken pox     COPD (chronic obstructive pulmonary disease)     COPD (chronic obstructive pulmonary disease)     Depression     Fatty liver     Fibromyalgia     Hyperlipidemia     Hypertension     IBS (irritable bowel syndrome)     Inflammatory arthritis     Menopause     Mumps     Neuropathy     SASHA (obstructive sleep apnea)     Osteoarthritis     Respiratory failure     5/12/2022- [place]    Rheumatoid arthritis     RLS (restless legs syndrome)       Past Surgical History:   Procedure Laterality Date    CARDIAC CATHETERIZATION      CARDIAC CATHETERIZATION N/A 6/10/2020    Procedure: Left Heart Cath;  Surgeon: Juan Miguel Cole MD;  Location: [facility] CATH INVASIVE LOCATION;  Service: Cardiovascular;  Laterality: N/A;    CHOLECYSTECTOMY       "FRACTURE SURGERY      left wrist    OVARIAN CYST REMOVAL      SPINE SURGERY        Family History   Problem Relation Age of Onset    Hypertension Father     COPD Father     Colon cancer Mother     Coronary artery disease Brother     COPD Brother     Stroke Brother       Social History     Socioeconomic History    Marital status:     Number of children: 1   Tobacco Use    Smoking status: Every Day     Packs/day: 0.75     Years: 25.00     Additional pack years: 0.00     Total pack years: 18.75     Types: Cigarettes     Passive exposure: Current    Smokeless tobacco: Never   Vaping Use    Vaping Use: Never used   Substance and Sexual Activity    Alcohol use: No    Drug use: No    Sexual activity: Defer     Review of Systems   Neurological:  Positive for tremors and numbness.   All other systems reviewed and are negative.      Objective:    /68   Pulse 73   Ht 157.5 cm (62\")   Wt 78 kg (172 lb)   SpO2 93%   BMI 31.46 kg/m²     Neurology Exam:    General apperance: NAD.     Mental status: Alert, awake and oriented to time place and person.    Recent and Remote memory: Intact.    Attention span and Concentration: Normal.     Language and Speech: Intact- No dysarthria.    Fluency, Naming , Repitition and Comprehension:  Intact    Cranial Nerves:   CN II: Visual fields are full. Intact. Fundi - Normal, No papillederma, Pupils - NICOLE  CN III, IV and VI: Extraocular movements are intact. Normal saccades.   CN V: Facial sensation is intact.   CN VII: Muscles of facial expression reveal no asymmetry. Intact.   CN VIII: Hearing is intact. Whispered voice intact.   CN IX and X: Palate elevates symmetrically. Intact  CN XI: Shoulder shrug is intact.   CN XII: Tongue is midline without evidence of atrophy or fasciculation.     Ophthalmoscopic exam of optic disc-normal    Motor: Fine postural/kinetic tremors noted in the left more than right hand.  She had an extremely mild tremor while drawing a Andino's wheel but " not while writing.  No micrographia noted    Right UE muscle strength 5/5. Normal tone.     Left UE muscle strength 5/5. Normal tone.      Right LE muscle strength5/5. Normal tone.     Left LE muscle strength 5/5. Normal tone.      Sensory: Reduced to pinprick in both hands till above wrist and in both lower extremities to the level of ankle with markedly reduced vibration     DTRs: 2+ bilaterally in upper and lower extremities.    Babinski: Negative bilaterally.    Co-ordination: Normal finger-to-nose, heel to shin B/L.    Rhomberg: Positive    Gait: Kyphotic posture as well as slightly broad-based gait    Cardiovascular: Regular rate and rhythm without murmur, gallop or rub.    Assessment and Plan:  1. Polyneuropathy  Her neuropathy is most likely due to her prediabetes/diabetes.  Last A1c of 5.4, GFR was 59  I will defer increasing her dose of gabapentin due to her CKD.  She can continue 800 mg 3 times daily  She should also continue alpha lipoic acid supplements    2. Essential tremor  Since she is having word finding difficulties I will gradually taper and stop Topamax  Instead I will restart her back on primidone 50 mg nightly that previously helped (more than Topamax)    3.  Right sacral radiculopathy  Based on her symptoms she could have right S1 radiculopathy  I will give her a PT referral    4.  Smoker  She smokes 1 pack of cigarettes daily.  I have once again counseled her to cut back on her smoking       Return in about 4 months (around 4/4/2024).     I spent over 25 minutes with the patient face to face out of which over 50% (20 minutes) was spent in management, instructions and education.     Emiliana Murdock MD I admit

## 2024-01-22 ENCOUNTER — TELEPHONE (OUTPATIENT)
Dept: NEUROLOGY | Facility: CLINIC | Age: 69
End: 2024-01-22

## 2024-01-22 NOTE — TELEPHONE ENCOUNTER
Provider: ADWOA NOEL MD    Caller: JONH    Relationship to Patient: SELF    Phone Number: [phone]    Reason for Call: CALLING REGARDING THE MEDICATION CHANGED TO PRIMIDONE.  STATED SHE IS STILL TREMORS.  STATED ITI IS WHEN SHE IS DOING SOMETHING TEDIOUS.  STATED LAST NIGHT WHILE IN THE KITCHEN HER KNEES TO BUCKLED, BUT DID NOT FALL..  STATED SHE ALSO FELL ON HUAN DAY.   STATED THIS HAPPENED LAST YEAR AND ENDED UP IN THE HOSPITAL D/T ACCIDENTAL DRUG OVERDOSE.     When was the patient last seen: 12-4-23    When did it start: NA    Where is it located: HANDS AND LEGS    Characteristics of symptom/severity:    Timing- Is it constant or intermittent: INTERMITTENT    PLEASE CALL & ADVISE

## 2024-01-23 RX ORDER — PRIMIDONE 50 MG/1
50 TABLET ORAL 2 TIMES DAILY
Qty: 60 TABLET | Refills: 5 | Status: SHIPPED | OUTPATIENT
Start: 2024-01-23

## 2024-02-07 DIAGNOSIS — G62.9 POLYNEUROPATHY: ICD-10-CM

## 2024-02-07 RX ORDER — GABAPENTIN 800 MG/1
800 TABLET ORAL 3 TIMES DAILY
Qty: 90 TABLET | Refills: 5 | Status: SHIPPED | OUTPATIENT
Start: 2024-02-07 | End: 2025-02-06

## 2024-02-07 NOTE — TELEPHONE ENCOUNTER
Rx Refill Note  Requested Prescriptions     Pending Prescriptions Disp Refills    gabapentin (Neurontin) 800 MG tablet 90 tablet 5     Sig: Take 1 tablet by mouth 3 (Three) Times a Day.      Last filled:08/07/2023  Last office visit with prescribing clinician: 12/4/2023      Next office visit with prescribing clinician: 4/4/2024     Lynn Shields MA  02/07/24, 15:57 EST

## 2024-02-07 NOTE — TELEPHONE ENCOUNTER
Caller: Quyen Galvan    Relationship: Self    Best call back number: [phone]    Requested Prescriptions:   Requested Prescriptions     Pending Prescriptions Disp Refills    gabapentin (Neurontin) 800 MG tablet 90 tablet 5     Sig: Take 1 tablet by mouth 3 (Three) Times a Day.        Pharmacy where request should be sent: [address] [phone] [facility] [phone] [facility]     Last office visit with prescribing clinician: 12/4/2023   Last telemedicine visit with prescribing clinician: Visit date not found   Next office visit with prescribing clinician: 4/4/2024     Additional details provided by patient: PATIENT STATES SHE JUST PICKED UP HER SCRIPT BUT THERE ARE NO REFILLS.    Does the patient have less than a 3 day supply:  [] Yes  [x] No    Would you like a call back once the refill request has been completed: [] Yes [x] No    If the office needs to give you a call back, can they leave a voicemail: [] Yes [x] No    Angie Ma Rep   02/07/24 15:21 EST

## 2024-02-26 ENCOUNTER — TELEPHONE (OUTPATIENT)
Dept: NEUROLOGY | Facility: CLINIC | Age: 69
End: 2024-02-26
Payer: MEDICARE

## 2024-02-26 NOTE — TELEPHONE ENCOUNTER
Caller: Mandy Quyen    Relationship: Self    Best call back number: [phone]    What is the medical concern/diagnosis: RIGHT SACRAL RADICULOPATHY    What specialty or service is being requested: PHYSICAL THERAPY    What is the provider, practice or medical service name: [facility]    What is the office location: [address] [place] [address], [address], [place] [address]    What is the office phone number: [phone]    What is the office fax number: [phone]    Any additional details: PT STATES HER REFERRAL THAT WAS SENT BACK IN 2023 HAS  AND THEY ARE NEEDING A NEW REFERRAL IN ORDER TO GET HER SCHEDULED.    PLEASE REVIEW AND ADVISE.

## 2024-02-29 DIAGNOSIS — M54.18 RIGHT SACRAL RADICULOPATHY: Primary | ICD-10-CM

## 2024-02-29 NOTE — TELEPHONE ENCOUNTER
Caller: Quyen Galvan    Relationship: Self    Best call back number: [phone]    What was the call regarding: PT HAS BEEN NOTIFIED THAT REFERRAL TO PHYSICAL THERAPY HAS BEEN PLACED PER HER REQUEST.    Do you require a callback: IF NEEDED.    DOCUMENTING PER PROTOCOL.

## 2024-03-08 ENCOUNTER — TELEPHONE (OUTPATIENT)
Dept: NEUROLOGY | Facility: CLINIC | Age: 69
End: 2024-03-08
Payer: MEDICARE

## 2024-03-08 NOTE — TELEPHONE ENCOUNTER
Provider:BERT    Caller: JONH    Phone Number: [phone]    Reason for Call: PT CALLED AND STATES THAT PHYSICAL THERAPY [facility] HAS NOT RECEIVED REFERRAL FOR PT. PT IS WANTING TO KNOW IF REFERRAL FOR RIGHT LEG CAN BE RE FAXED. PT ASKED TO PLEASE CALL WHEN ORDERS ARE FAXED.    PHONE#[phone]   FAX#[phone]      PLEASE REVIEW AND ADVISE.  THANK YOU

## 2024-03-18 ENCOUNTER — TELEPHONE (OUTPATIENT)
Dept: NEUROLOGY | Facility: CLINIC | Age: 69
End: 2024-03-18
Payer: MEDICARE

## 2024-03-18 NOTE — TELEPHONE ENCOUNTER
Caller: Quyen Galvan    Relationship: Self    Best call back number: [phone]- VMAIL CAN NOT BE LEFT, BUT TEXT CAN BE RECEIVED.     What is the best time to reach you: ANY    Who are you requesting to speak with (clinical staff, provider,  specific staff member): PROVIDER    What was the call regarding: PATIENT ADVISES HER PRIMIDONE  TAKEN TWICE PER DAY IS NOT WORKING. SHE RECENTLY CAN NOT CONVEY THE THOUGHTS IN HER HEAD OUT LOUD VERBALLY SOMETIMES. SHE EITHER STUTTERS OR IT'S JUMBLED.      PLEASE CALL TO ADVISE-THANK YOU

## 2024-03-18 NOTE — TELEPHONE ENCOUNTER
Name: Quyen Galvan    Relationship: Self    HUB PROVIDED THE RELAY MESSAGE FROM THE OFFICE   PATIENT VOICED UNDERSTANDING AND HAS NO FURTHER QUESTIONS AT THIS TIME

## 2024-03-18 NOTE — TELEPHONE ENCOUNTER
"Relay     \"Called Quyen, no answer and unavailable to leave voicemail, to let her know I have successfully faxed PT order to fax number provided, and to apologize for the extreme delay in completing her request.     CALVIN Colvin \"                  "

## 2024-03-21 NOTE — TELEPHONE ENCOUNTER
Patient is taking both primidone at night already and is not taking Topamax. Patient having stuttering issues when talking and hands shaking.  Patient states having low energy.  Please advise on next steps.

## 2024-03-22 RX ORDER — PROPRANOLOL HYDROCHLORIDE 20 MG/1
20 TABLET ORAL 2 TIMES DAILY
Qty: 60 TABLET | Refills: 5 | Status: SHIPPED | OUTPATIENT
Start: 2024-03-22

## 2024-03-22 NOTE — TELEPHONE ENCOUNTER
Patient notified via voicemail of Dr. Murdock's recommendation and advised patient to call back with any concerns.

## 2024-06-25 ENCOUNTER — OFFICE VISIT (OUTPATIENT)
Dept: NEUROLOGY | Facility: CLINIC | Age: 69
End: 2024-06-25
Payer: MEDICARE

## 2024-06-25 VITALS
BODY MASS INDEX: 32.2 KG/M2 | OXYGEN SATURATION: 95 % | SYSTOLIC BLOOD PRESSURE: 130 MMHG | HEIGHT: 62 IN | DIASTOLIC BLOOD PRESSURE: 84 MMHG | WEIGHT: 175 LBS | HEART RATE: 85 BPM

## 2024-06-25 DIAGNOSIS — M54.41 CHRONIC BILATERAL LOW BACK PAIN WITH BILATERAL SCIATICA: ICD-10-CM

## 2024-06-25 DIAGNOSIS — G89.29 CHRONIC BILATERAL LOW BACK PAIN WITH BILATERAL SCIATICA: ICD-10-CM

## 2024-06-25 DIAGNOSIS — G62.9 POLYNEUROPATHY: Primary | ICD-10-CM

## 2024-06-25 DIAGNOSIS — G25.0 ESSENTIAL TREMOR: ICD-10-CM

## 2024-06-25 DIAGNOSIS — M54.42 CHRONIC BILATERAL LOW BACK PAIN WITH BILATERAL SCIATICA: ICD-10-CM

## 2024-06-25 PROCEDURE — 1159F MED LIST DOCD IN RCRD: CPT | Performed by: PSYCHIATRY & NEUROLOGY

## 2024-06-25 PROCEDURE — 1160F RVW MEDS BY RX/DR IN RCRD: CPT | Performed by: PSYCHIATRY & NEUROLOGY

## 2024-06-25 PROCEDURE — 3079F DIAST BP 80-89 MM HG: CPT | Performed by: PSYCHIATRY & NEUROLOGY

## 2024-06-25 PROCEDURE — 3075F SYST BP GE 130 - 139MM HG: CPT | Performed by: PSYCHIATRY & NEUROLOGY

## 2024-06-25 PROCEDURE — 99214 OFFICE O/P EST MOD 30 MIN: CPT | Performed by: PSYCHIATRY & NEUROLOGY

## 2024-06-25 RX ORDER — PRIMIDONE 50 MG/1
50 TABLET ORAL 2 TIMES DAILY
Qty: 60 TABLET | Refills: 5 | Status: SHIPPED | OUTPATIENT
Start: 2024-06-25

## 2024-06-25 RX ORDER — PROPRANOLOL HYDROCHLORIDE 40 MG/1
40 TABLET ORAL 2 TIMES DAILY
Qty: 60 TABLET | Refills: 5 | Status: SHIPPED | OUTPATIENT
Start: 2024-06-25

## 2024-06-25 NOTE — PROGRESS NOTES
Subjective:    CC: Quyen Galvan is seen today for tremors/neuropathy    Current visit-patient states that she increased her primidone to 100 mg nightly in addition to her propranolol 20 mg twice daily but still has occasional tremors in her hands while holding objects or carrying out tasks.  Her neuropathy symptoms are adequately controlled with gabapentin however she is still having constant low back pain that occasionally radiates down her legs.  Her legs also buckle down occasionally.  She had MRI of her back at [facility] and was told that she had significant arthritic changes for which she could either choose to have surgery or see her pain management physician.  She does have a follow-up with pain management next month and she is going to discuss ablation with them as she had it in the past with good results.  Today she tells me that she is also having word finding difficulties.  When she was previously on Topamax for tremors these symptoms were mild but since stopping Topamax her word finding difficulties have worsened.  She also takes opiates 3 times a day for back pain.      Last visit-patient states that her neuropathy symptoms are well controlled since she started alpha lipoic acid supplements in addition to gabapentin 800 mg 3 times daily.  Her last A1c was once again below 6.  She has however started to have severe pain on the outer aspect of her right foot where she finds it difficult to even put the slightest pressure.  Also has low back pain.  For her tremors she increased her Topamax to 25/50 mg but it has not helped at all.  She is also having word finding difficulties now.     Last visit-patient states that her neuropathy symptoms including numbness and pain have worsened in both her hands and feet despite increasing her gabapentin to 800 mg 3 times daily.  Her A1c is much better controlled now at 5.4.  Her tremors are also worse.  She continues to take Topamax 25 mg nightly.       Initial visit-patient is a 67-year-old female with a history of hypertension, CAD, COPD, smoker presents with tremors and paresthesias in hands and feet.  Patient states she started having tremors in her hands as well as her head a few years ago.  The tremors are mainly while carrying out tasks such as holding objects or writing.  She denies any family history of tremors.  Antonella had previously started her on primidone but did not help therefore she stopped taking it.  She was then started on Topamax 25 mg nightly which has helped a lot.  She also has started to have tingling and numbness in her hands and feet feet a few years ago.  She was diagnosed with neuropathy and started on gabapentin that was gradually increased to 600 mg 3 times a day.  She still has some symptoms in her feet despite taking the dose.  She had blood work which showed a normal TSH, A1c was 6.5 (but she is on no meds for diabetes), SPEP was normal, B12 and B6 were elevated and GFR was 59.  She had a CT scan of the head in 2019 that was unremarkable.  She does smoke about 1 pack of cigarettes daily.  Of note-I reviewed Antonella's notes     The following portions of the patient's history were reviewed today and updated as of 02/02/2023  : allergies, current medications, past family history, past medical history, past social history, past surgical history and problem list  These document will be scanned to patient's chart.      Current Outpatient Medications:     albuterol (PROVENTIL HFA;VENTOLIN HFA) 108 (90 BASE) MCG/ACT inhaler, Inhale 2 puffs Every 4 (Four) Hours As Needed for Wheezing., Disp: , Rfl:     aspirin (Aspirin Low Dose) 81 MG EC tablet, Take 1 tablet by mouth Daily., Disp: 90 tablet, Rfl: 3    atorvastatin (LIPITOR) 20 MG tablet, Take 1 tablet by mouth Daily., Disp: , Rfl:     B Complex Vitamins (B COMPLEX-B12 PO), Take  by mouth., Disp: , Rfl:     bumetanide (BUMEX) 1 MG tablet, Take 1 tablet by mouth Daily., Disp: , Rfl:      calcium carbonate (OS-NORTH) 600 MG tablet, Take 1 tablet by mouth Daily., Disp: , Rfl:     Cholecalciferol 25 MCG (1000 UT) tablet, Take 1 tablet by mouth Daily., Disp: , Rfl:     docusate sodium (COLACE) 100 MG capsule, TAKE 1 CAPSULE BY MOUTH TWICE A DAY FOR 30 DAYS, Disp: , Rfl:     docusate sodium (COLACE) 250 MG capsule, Take 1 capsule by mouth 2 (Two) Times a Day., Disp: , Rfl:     famotidine (PEPCID) 20 MG tablet, Take 1 tablet by mouth 2 (Two) Times a Day., Disp: , Rfl:     ferrous sulfate 140 (45 Fe) MG tablet controlled-release tablet, Take  by mouth Daily With Breakfast., Disp: , Rfl:     fluticasone (FLONASE) 50 MCG/ACT nasal spray, 2 sprays by Each Nare route Daily., Disp: , Rfl:     Fluticasone-Umeclidin-Vilant (TRELEGY) 100-62.5-25 MCG/INH inhaler, Inhale 1 puff Daily., Disp: , Rfl:     gabapentin (Neurontin) 800 MG tablet, Take 1 tablet by mouth 3 (Three) Times a Day., Disp: 90 tablet, Rfl: 5    HYDROcodone-acetaminophen (NORCO)  MG per tablet, Take 1 tablet by mouth Every 6 (Six) Hours., Disp: , Rfl:     ibuprofen (ADVIL,MOTRIN) 400 MG tablet, Take 1 tablet by mouth 3 (Three) Times a Day., Disp: , Rfl:     Lactobacillus (Acidophilus Probiotic) 100 MG capsule, Take 2 capsules by mouth Daily., Disp: , Rfl:     lidocaine (LIDODERM) 5 %, Place 1 patch on the skin as directed by provider Daily. Remove & Discard patch within 12 hours or as directed by MD, Disp: , Rfl:     nystatin (MYCOSTATIN) 417401 UNIT/GM cream, , Disp: , Rfl:     omeprazole (priLOSEC) 40 MG capsule, Take 1 capsule by mouth 2 (Two) Times a Day., Disp: , Rfl:     ondansetron (ZOFRAN) 4 MG tablet, Take 1 tablet by mouth Every 8 (Eight) Hours As Needed for Nausea or Vomiting., Disp: , Rfl:     potassium chloride (K-DUR,KLOR-CON) 20 MEQ CR tablet, Take 1 tablet by mouth 3 (Three) Times a Day., Disp: , Rfl:     primidone (MYSOLINE) 50 MG tablet, Take 1 tablet by mouth 2 (Two) Times a Day., Disp: 60 tablet, Rfl: 5    Probiotic capsule,  Take 1 capsule by mouth Daily., Disp: , Rfl:     propranolol (INDERAL) 40 MG tablet, Take 1 tablet by mouth 2 (Two) Times a Day., Disp: 60 tablet, Rfl: 5   Past Medical History:   Diagnosis Date    Acid reflux     Anemia     Anxiety     Arthritis     Bursitis of both hips     Cellulitis     Chicken pox     COPD (chronic obstructive pulmonary disease)     COPD (chronic obstructive pulmonary disease)     Depression     Fatty liver     Fibromyalgia     Hyperlipidemia     Hypertension     IBS (irritable bowel syndrome)     Inflammatory arthritis     Menopause     Mumps     Neuropathy     SASHA (obstructive sleep apnea)     Osteoarthritis     Respiratory failure     5/12/2022- [place]    Rheumatoid arthritis     RLS (restless legs syndrome)       Past Surgical History:   Procedure Laterality Date    CARDIAC CATHETERIZATION      CARDIAC CATHETERIZATION N/A 6/10/2020    Procedure: Left Heart Cath;  Surgeon: Juan Miguel Cole MD;  Location: [facility] CATH INVASIVE LOC[facility];  Service: Cardiovascular;  Laterality: N/A;    CHOLECYSTECTOMY      FRACTURE SURGERY      left wrist    OVARIAN CYST REMOVAL      SPINE SURGERY        Family History   Problem Relation Age of Onset    Hypertension Father     COPD Father     Colon cancer Mother     Coronary artery disease Brother     COPD Brother     Stroke Brother       Social History     Socioeconomic History    Marital status:     Number of children: 1   Tobacco Use    Smoking status: Every Day     Current packs/day: 0.75     Average packs/day: 0.8 packs/day for 25.0 years (18.8 ttl pk-yrs)     Types: Cigarettes     Passive exposure: Current    Smokeless tobacco: Never   Vaping Use    Vaping status: Never Used   Substance and Sexual Activity    Alcohol use: No    Drug use: No    Sexual activity: Defer     Review of Systems   Neurological:  Positive for tremors and numbness.   All other systems reviewed and are negative.      Objective:    /84   Pulse 85   Ht 157.5 cm  "(62\")   Wt 79.4 kg (175 lb)   SpO2 95%   BMI 32.01 kg/m²     Neurology Exam:    General apperance: NAD.     Mental status: Alert, awake and oriented to time place and person.    Recent and Remote memory: Intact.    Attention span and Concentration: Normal.     Language and Speech: Intact- No dysarthria.    Fluency, Naming , Repitition and Comprehension:  Intact    Cranial Nerves:   CN II: Visual fields are full. Intact. Fundi - Normal, No papillederma, Pupils - NICOLE  CN III, IV and VI: Extraocular movements are intact. Normal saccades.   CN V: Facial sensation is intact.   CN VII: Muscles of facial expression reveal no asymmetry. Intact.   CN VIII: Hearing is intact. Whispered voice intact.   CN IX and X: Palate elevates symmetrically. Intact  CN XI: Shoulder shrug is intact.   CN XII: Tongue is midline without evidence of atrophy or fasciculation.     Ophthalmoscopic exam of optic disc-normal    Motor: Fine postural/kinetic tremors noted in the left more than right hand.  She had an extremely mild tremor while drawing a Andino's wheel but not while writing.  No micrographia noted    Right UE muscle strength 5/5. Normal tone.     Left UE muscle strength 5/5. Normal tone.      Right LE muscle strength5/5. Normal tone.     Left LE muscle strength 5/5. Normal tone.      Sensory: Reduced to pinprick in both hands till above wrist and in both lower extremities to the level of ankle with markedly reduced vibration     DTRs: 2+ bilaterally in upper and lower extremities.    Babinski: Negative bilaterally.    Co-ordination: Normal finger-to-nose, heel to shin B/L.    Rhomberg: Positive    Gait: Kyphotic posture as well as slightly broad-based gait    Cardiovascular: Regular rate and rhythm without murmur, gallop or rub.    Assessment and Plan:  1. Polyneuropathy  Her neuropathy is most likely due to her prediabetes/diabetes.  Last A1c of 5.4, GFR was 59  I will defer increasing her dose of gabapentin due to her CKD.  She " can continue 800 mg 3 times daily.  She has occasional leg jerks which could be due to gabapentin  She should also continue alpha lipoic acid supplements    2. Essential tremor  She should continue primidone 100 mg nightly  I will increase her propranolol to 40 mg twice daily    3.  Chronic low back pain with sciatica  I told her to speak to pain management about either ablation, epidural blocks or a trial of spinal cord stimulator  Of note-I explained to her that her word finding difficulties could be due to multiple sedating medications including gabapentin, opiates and primidone however she does not want to cut back on her gabapentin and opiates as it is helping with her low back pain.       Return in about 6 months (around 12/25/2024).     I spent over 25 minutes with the patient face to face out of which over 50% (20 minutes) was spent in management, instructions and education.     Emiliana Murdock MD I admit

## 2024-08-15 DIAGNOSIS — G62.9 POLYNEUROPATHY: ICD-10-CM

## 2024-08-15 RX ORDER — GABAPENTIN 800 MG/1
800 TABLET ORAL 3 TIMES DAILY
Qty: 90 TABLET | Refills: 5 | OUTPATIENT
Start: 2024-08-15 | End: 2025-08-15

## 2024-08-15 RX ORDER — GABAPENTIN 800 MG/1
800 TABLET ORAL 3 TIMES DAILY
Qty: 90 TABLET | Refills: 5 | Status: SHIPPED | OUTPATIENT
Start: 2024-08-15 | End: 2025-08-15

## 2024-08-15 NOTE — TELEPHONE ENCOUNTER
Caller: JONH Mckinnon call back number: [phone]     Requested Prescriptions:   Requested Prescriptions     Pending Prescriptions Disp Refills    gabapentin (Neurontin) 800 MG tablet 90 tablet 5     Sig: Take 1 tablet by mouth 3 (Three) Times a Day.        Pharmacy where request should be sent:  [facility]    Last office visit with prescribing clinician: 6/25/2024   Last telemedicine visit with prescribing clinician: Visit date not found   Next office visit with prescribing clinician: 12/30/2024     Additional details provided by patient: PT HAS RIGHT AT A 3 DAY SUPPLY    Does the patient have less than a 3 day supply:  [] Yes  [x] No        Angie Morris   08/15/24 13:06 EDT

## 2024-08-15 NOTE — TELEPHONE ENCOUNTER
Rx Refill Note  Requested Prescriptions     Pending Prescriptions Disp Refills    gabapentin (NEURONTIN) 800 MG tablet [Pharmacy Med Name: GABAPENTIN 800 MG TABS 800 Tablet] 90 tablet 5     Sig: TAKE 1 TABLET BY MOUTH 3 (THREE) TIMES A DAY.      Last filled:2/7/24 5 refill  Last office visit with prescribing clinician: 6/25/2024      Next office visit with prescribing clinician: 12/30/24    FRANC CHRISTENSEN  08/15/24, 15:36 EDT    Pended to provider

## 2024-09-05 ENCOUNTER — APPOINTMENT (OUTPATIENT)
Dept: GENERAL RADIOLOGY | Facility: HOSPITAL | Age: 69
End: 2024-09-05
Payer: MEDICARE

## 2024-09-05 ENCOUNTER — HOSPITAL ENCOUNTER (EMERGENCY)
Facility: HOSPITAL | Age: 69
Discharge: HOME | End: 2024-09-05
Attending: EMERGENCY MEDICINE
Payer: MEDICARE

## 2024-09-05 ENCOUNTER — APPOINTMENT (OUTPATIENT)
Dept: CT IMAGING | Facility: HOSPITAL | Age: 69
End: 2024-09-05
Payer: MEDICARE

## 2024-09-05 VITALS
HEART RATE: 88 BPM | DIASTOLIC BLOOD PRESSURE: 86 MMHG | BODY MASS INDEX: 30 KG/M2 | OXYGEN SATURATION: 95 % | WEIGHT: 163 LBS | RESPIRATION RATE: 16 BRPM | SYSTOLIC BLOOD PRESSURE: 112 MMHG | HEIGHT: 62 IN | TEMPERATURE: 98.3 F

## 2024-09-05 DIAGNOSIS — R82.81 PYURIA: ICD-10-CM

## 2024-09-05 DIAGNOSIS — R29.6 FREQUENT FALLS: Primary | ICD-10-CM

## 2024-09-05 LAB
ALBUMIN SERPL-MCNC: 4.2 G/DL (ref 3.5–5.2)
ALBUMIN/GLOB SERPL: 1.6 G/DL
ALP SERPL-CCNC: 98 U/L (ref 39–117)
ALT SERPL W P-5'-P-CCNC: 17 U/L (ref 1–33)
AMPHET+METHAMPHET UR QL: POSITIVE
AMPHETAMINES UR QL: NEGATIVE
ANION GAP SERPL CALCULATED.3IONS-SCNC: 10 MMOL/L (ref 5–15)
AST SERPL-CCNC: 18 U/L (ref 1–32)
BACTERIA UR QL AUTO: ABNORMAL /HPF
BARBITURATES UR QL SCN: POSITIVE
BASOPHILS # BLD AUTO: 0.02 10*3/MM3 (ref 0–0.2)
BASOPHILS NFR BLD AUTO: 0.3 % (ref 0–1.5)
BENZODIAZ UR QL SCN: NEGATIVE
BILIRUB SERPL-MCNC: 0.4 MG/DL (ref 0–1.2)
BILIRUB UR QL STRIP: NEGATIVE
BUN SERPL-MCNC: 15 MG/DL (ref 8–23)
BUN/CREAT SERPL: 17.4 (ref 7–25)
BUPRENORPHINE SERPL-MCNC: NEGATIVE NG/ML
CALCIUM SPEC-SCNC: 9.5 MG/DL (ref 8.6–10.5)
CANNABINOIDS SERPL QL: NEGATIVE
CHLORIDE SERPL-SCNC: 100 MMOL/L (ref 98–107)
CLARITY UR: CLEAR
CLUE CELLS SPEC QL WET PREP: NORMAL
CO2 SERPL-SCNC: 30 MMOL/L (ref 22–29)
COCAINE UR QL: NEGATIVE
COLOR UR: YELLOW
CREAT SERPL-MCNC: 0.86 MG/DL (ref 0.57–1)
DEPRECATED RDW RBC AUTO: 57.8 FL (ref 37–54)
EGFRCR SERPLBLD CKD-EPI 2021: 73.2 ML/MIN/1.73
EOSINOPHIL # BLD AUTO: 0.1 10*3/MM3 (ref 0–0.4)
EOSINOPHIL NFR BLD AUTO: 1.3 % (ref 0.3–6.2)
ERYTHROCYTE [DISTWIDTH] IN BLOOD BY AUTOMATED COUNT: 14.6 % (ref 12.3–15.4)
FENTANYL UR-MCNC: NEGATIVE NG/ML
GLOBULIN UR ELPH-MCNC: 2.7 GM/DL
GLUCOSE SERPL-MCNC: 102 MG/DL (ref 65–99)
GLUCOSE UR STRIP-MCNC: NEGATIVE MG/DL
HCT VFR BLD AUTO: 36.7 % (ref 34–46.6)
HGB BLD-MCNC: 12.1 G/DL (ref 12–15.9)
HGB UR QL STRIP.AUTO: NEGATIVE
HOLD SPECIMEN: NORMAL
HYALINE CASTS UR QL AUTO: ABNORMAL /LPF
HYDATID CYST SPEC WET PREP: NORMAL
IMM GRANULOCYTES # BLD AUTO: 0.04 10*3/MM3 (ref 0–0.05)
IMM GRANULOCYTES NFR BLD AUTO: 0.5 % (ref 0–0.5)
KETONES UR QL STRIP: NEGATIVE
KOH PREP NAIL: NORMAL
LEUKOCYTE ESTERASE UR QL STRIP.AUTO: ABNORMAL
LYMPHOCYTES # BLD AUTO: 1.2 10*3/MM3 (ref 0.7–3.1)
LYMPHOCYTES NFR BLD AUTO: 15.2 % (ref 19.6–45.3)
MAGNESIUM SERPL-MCNC: 1.7 MG/DL (ref 1.6–2.4)
MCH RBC QN AUTO: 36.1 PG (ref 26.6–33)
MCHC RBC AUTO-ENTMCNC: 33 G/DL (ref 31.5–35.7)
MCV RBC AUTO: 109.6 FL (ref 79–97)
METHADONE UR QL SCN: NEGATIVE
MONOCYTES # BLD AUTO: 0.34 10*3/MM3 (ref 0.1–0.9)
MONOCYTES NFR BLD AUTO: 4.3 % (ref 5–12)
NEUTROPHILS NFR BLD AUTO: 6.22 10*3/MM3 (ref 1.7–7)
NEUTROPHILS NFR BLD AUTO: 78.4 % (ref 42.7–76)
NITRITE UR QL STRIP: NEGATIVE
NRBC BLD AUTO-RTO: 0 /100 WBC (ref 0–0.2)
OPIATES UR QL: POSITIVE
OXYCODONE UR QL SCN: NEGATIVE
PCP UR QL SCN: NEGATIVE
PH UR STRIP.AUTO: 5.5 [PH] (ref 5–8)
PLATELET # BLD AUTO: 130 10*3/MM3 (ref 140–450)
PMV BLD AUTO: 12.5 FL (ref 6–12)
POTASSIUM SERPL-SCNC: 4.1 MMOL/L (ref 3.5–5.2)
PROT SERPL-MCNC: 6.9 G/DL (ref 6–8.5)
PROT UR QL STRIP: NEGATIVE
RBC # BLD AUTO: 3.35 10*6/MM3 (ref 3.77–5.28)
RBC # UR STRIP: ABNORMAL /HPF
REF LAB TEST METHOD: ABNORMAL
SODIUM SERPL-SCNC: 140 MMOL/L (ref 136–145)
SP GR UR STRIP: 1.01 (ref 1–1.03)
SQUAMOUS #/AREA URNS HPF: ABNORMAL /HPF
T VAGINALIS SPEC QL WET PREP: NORMAL
TRICYCLICS UR QL SCN: NEGATIVE
TROPONIN T SERPL HS-MCNC: 20 NG/L
UROBILINOGEN UR QL STRIP: ABNORMAL
WBC # UR STRIP: ABNORMAL /HPF
WBC NRBC COR # BLD AUTO: 7.92 10*3/MM3 (ref 3.4–10.8)
WBC SPEC QL WET PREP: NORMAL
WHOLE BLOOD HOLD COAG: NORMAL
WHOLE BLOOD HOLD SPECIMEN: NORMAL
YEAST GENITAL QL WET PREP: NORMAL

## 2024-09-05 PROCEDURE — 87661 TRICHOMONAS VAGINALIS AMPLIF: CPT | Performed by: EMERGENCY MEDICINE

## 2024-09-05 PROCEDURE — 73560 X-RAY EXAM OF KNEE 1 OR 2: CPT

## 2024-09-05 PROCEDURE — 87220 TISSUE EXAM FOR FUNGI: CPT | Performed by: EMERGENCY MEDICINE

## 2024-09-05 PROCEDURE — 87591 N.GONORRHOEAE DNA AMP PROB: CPT | Performed by: EMERGENCY MEDICINE

## 2024-09-05 PROCEDURE — 87210 SMEAR WET MOUNT SALINE/INK: CPT | Performed by: EMERGENCY MEDICINE

## 2024-09-05 PROCEDURE — 72220 X-RAY EXAM SACRUM TAILBONE: CPT

## 2024-09-05 PROCEDURE — 85025 COMPLETE CBC W/AUTO DIFF WBC: CPT | Performed by: EMERGENCY MEDICINE

## 2024-09-05 PROCEDURE — 84484 ASSAY OF TROPONIN QUANT: CPT | Performed by: EMERGENCY MEDICINE

## 2024-09-05 PROCEDURE — 36415 COLL VENOUS BLD VENIPUNCTURE: CPT

## 2024-09-05 PROCEDURE — 87801 DETECT AGNT MULT DNA AMPLI: CPT | Performed by: EMERGENCY MEDICINE

## 2024-09-05 PROCEDURE — 71045 X-RAY EXAM CHEST 1 VIEW: CPT

## 2024-09-05 PROCEDURE — 80307 DRUG TEST PRSMV CHEM ANLYZR: CPT | Performed by: NURSE PRACTITIONER

## 2024-09-05 PROCEDURE — 81001 URINALYSIS AUTO W/SCOPE: CPT | Performed by: EMERGENCY MEDICINE

## 2024-09-05 PROCEDURE — 87491 CHLMYD TRACH DNA AMP PROBE: CPT | Performed by: EMERGENCY MEDICINE

## 2024-09-05 PROCEDURE — 99284 EMERGENCY DEPT VISIT MOD MDM: CPT

## 2024-09-05 PROCEDURE — 93005 ELECTROCARDIOGRAM TRACING: CPT | Performed by: EMERGENCY MEDICINE

## 2024-09-05 PROCEDURE — 80053 COMPREHEN METABOLIC PANEL: CPT | Performed by: EMERGENCY MEDICINE

## 2024-09-05 PROCEDURE — 87798 DETECT AGENT NOS DNA AMP: CPT | Performed by: EMERGENCY MEDICINE

## 2024-09-05 PROCEDURE — 83735 ASSAY OF MAGNESIUM: CPT | Performed by: EMERGENCY MEDICINE

## 2024-09-05 PROCEDURE — 70450 CT HEAD/BRAIN W/O DYE: CPT

## 2024-09-05 RX ORDER — CEFUROXIME AXETIL 250 MG/1
500 TABLET ORAL ONCE
Status: COMPLETED | OUTPATIENT
Start: 2024-09-05 | End: 2024-09-05

## 2024-09-05 RX ORDER — SODIUM CHLORIDE 0.9 % (FLUSH) 0.9 %
10 SYRINGE (ML) INJECTION AS NEEDED
Status: DISCONTINUED | OUTPATIENT
Start: 2024-09-05 | End: 2024-09-05 | Stop reason: HOSPADM

## 2024-09-05 RX ORDER — CEFUROXIME AXETIL 500 MG/1
500 TABLET ORAL 2 TIMES DAILY
Qty: 20 TABLET | Refills: 0 | Status: SHIPPED | OUTPATIENT
Start: 2024-09-05 | End: 2024-09-15

## 2024-09-05 RX ORDER — PHENTERMINE HYDROCHLORIDE 37.5 MG/1
1 CAPSULE ORAL DAILY
COMMUNITY
Start: 2024-07-19

## 2024-09-05 RX ADMIN — CEFUROXIME AXETIL 500 MG: 250 TABLET, FILM COATED ORAL at 20:12

## 2024-09-05 NOTE — ED PROVIDER NOTES
EMERGENCY DEPARTMENT ENCOUNTER    Pt Name: Quyen Galvan  MRN: [id number]  Pt :   1955  Room Number:  GREG/GREG  Date of encounter:  2024  PCP: Akshat Dawson MD  ED Provider: ARI Brown    Historian: Patient      HPI:  Chief Complaint: Frequent fall        Context: Quyen Galvan is a 68 y.o. female who presents to the ED c/o frequent falls, generalized weakness.  Patient reports requiring falls for the past 2 years that are increasing.  She states her legs just not able to support her.  Furthermore concerned with kidney failure, noted bloody discharge on her panty liner.  Family concerned that she is having visual hallucination.   Patient is in pain management due to chronic back and joint pain, currently started on phentermine for weight loss.  Denies inappropriate medication use.      PAST MEDICAL HISTORY  Past Medical History:   Diagnosis Date    Acid reflux     Anemia     Anxiety     Arthritis     Bursitis of both hips     Cellulitis     Chicken pox     COPD (chronic obstructive pulmonary disease)     COPD (chronic obstructive pulmonary disease)     Depression     Fatty liver     Fibromyalgia     Hyperlipidemia     Hypertension     IBS (irritable bowel syndrome)     Inflammatory arthritis     Menopause     Mumps     Neuropathy     SASHA (obstructive sleep apnea)     Osteoarthritis     Respiratory failure     2022- [place]    Rheumatoid arthritis     RLS (restless legs syndrome)          PAST SURGICAL HISTORY  Past Surgical History:   Procedure Laterality Date    CARDIAC CATHETERIZATION      CARDIAC CATHETERIZATION N/A 6/10/2020    Procedure: Left Heart Cath;  Surgeon: Juan Miguel Cole MD;  Location: [facility] INVASIVE LOCATION;  Service: Cardiovascular;  Laterality: N/A;    CHOLECYSTECTOMY      FRACTURE SURGERY      left wrist    OVARIAN CYST REMOVAL      SPINE SURGERY           FAMILY HISTORY  Family History   Problem Relation Age of Onset    Hypertension Father     COPD  Father     Colon cancer Mother     Coronary artery disease Brother     COPD Brother     Stroke Brother          SOCIAL HISTORY  Social History     Socioeconomic History    Marital status:     Number of children: 1   Tobacco Use    Smoking status: Every Day     Current packs/day: 0.75     Average packs/day: 0.8 packs/day for 25.0 years (18.8 ttl pk-yrs)     Types: Cigarettes     Passive exposure: Current    Smokeless tobacco: Never   Vaping Use    Vaping status: Never Used   Substance and Sexual Activity    Alcohol use: No    Drug use: No    Sexual activity: Defer         ALLERGIES  Patient has no known allergies.        REVIEW OF SYSTEMS  Review of Systems       All systems reviewed and negative except for those discussed in HPI.       PHYSICAL EXAM    I have reviewed the triage vital signs and nursing notes.    ED Triage Vitals [09/05/24 1447]   Temp Heart Rate Resp BP SpO2   98.3 °F (36.8 °C) 85 16 110/72 96 %      Temp src Heart Rate Source Patient Position BP Location FiO2 (%)   Oral Monitor Sitting Left arm --       Physical Exam  GENERAL:   Appears in no acute distress.  Obese  HENT: Nares patent.  EYES: No scleral icterus.  CV: Regular rhythm, regular rate.  RESPIRATORY: Normal effort.  No audible wheezes, rales or rhonchi.  ABDOMEN: Soft, nontender  MUSCULOSKELETAL: No deformities.  Mild tenderness palpation over mid lumbar spine  NEURO: Alert, moves all extremities, follows commands.  SKIN: Warm, dry, no rash visualized.      LAB RESULTS  Recent Results (from the past 24 hour(s))   Comprehensive Metabolic Panel    Collection Time: 09/05/24  3:14 PM    Specimen: Blood   Result Value Ref Range    Glucose 102 (H) 65 - 99 mg/dL    BUN 15 8 - 23 mg/dL    Creatinine 0.86 0.57 - 1.00 mg/dL    Sodium 140 136 - 145 mmol/L    Potassium 4.1 3.5 - 5.2 mmol/L    Chloride 100 98 - 107 mmol/L    CO2 30.0 (H) 22.0 - 29.0 mmol/L    Calcium 9.5 8.6 - 10.5 mg/dL    Total Protein 6.9 6.0 - 8.5 g/dL    Albumin 4.2 3.5 -  5.2 g/dL    ALT (SGPT) 17 1 - 33 U/L    AST (SGOT) 18 1 - 32 U/L    Alkaline Phosphatase 98 39 - 117 U/L    Total Bilirubin 0.4 0.0 - 1.2 mg/dL    Globulin 2.7 gm/dL    A/G Ratio 1.6 g/dL    BUN/Creatinine Ratio 17.4 7.0 - 25.0    Anion Gap 10.0 5.0 - 15.0 mmol/L    eGFR 73.2 >60.0 mL/min/1.73   Single High Sensitivity Troponin T    Collection Time: 09/05/24  3:14 PM    Specimen: Blood   Result Value Ref Range    HS Troponin T 20 (H) <14 ng/L   Magnesium    Collection Time: 09/05/24  3:14 PM    Specimen: Blood   Result Value Ref Range    Magnesium 1.7 1.6 - 2.4 mg/dL   Green Top (Gel)    Collection Time: 09/05/24  3:14 PM   Result Value Ref Range    Extra Tube Hold for add-ons.    Lavender Top    Collection Time: 09/05/24  3:14 PM   Result Value Ref Range    Extra Tube hold for add-on    Gold Top - SST    Collection Time: 09/05/24  3:14 PM   Result Value Ref Range    Extra Tube Hold for add-ons.    Gray Top    Collection Time: 09/05/24  3:14 PM   Result Value Ref Range    Extra Tube Hold for add-ons.    Light Blue Top    Collection Time: 09/05/24  3:14 PM   Result Value Ref Range    Extra Tube Hold for add-ons.    CBC Auto Differential    Collection Time: 09/05/24  3:14 PM    Specimen: Blood   Result Value Ref Range    WBC 7.92 3.40 - 10.80 10*3/mm3    RBC 3.35 (L) 3.77 - 5.28 10*6/mm3    Hemoglobin 12.1 12.0 - 15.9 g/dL    Hematocrit 36.7 34.0 - 46.6 %    .6 (H) 79.0 - 97.0 fL    MCH 36.1 (H) 26.6 - 33.0 pg    MCHC 33.0 31.5 - 35.7 g/dL    RDW 14.6 12.3 - 15.4 %    RDW-SD 57.8 (H) 37.0 - 54.0 fl    MPV 12.5 (H) 6.0 - 12.0 fL    Platelets 130 (L) 140 - 450 10*3/mm3    Neutrophil % 78.4 (H) 42.7 - 76.0 %    Lymphocyte % 15.2 (L) 19.6 - 45.3 %    Monocyte % 4.3 (L) 5.0 - 12.0 %    Eosinophil % 1.3 0.3 - 6.2 %    Basophil % 0.3 0.0 - 1.5 %    Immature Grans % 0.5 0.0 - 0.5 %    Neutrophils, Absolute 6.22 1.70 - 7.00 10*3/mm3    Lymphocytes, Absolute 1.20 0.70 - 3.10 10*3/mm3    Monocytes, Absolute 0.34 0.10 -  0.90 10*3/mm3    Eosinophils, Absolute 0.10 0.00 - 0.40 10*3/mm3    Basophils, Absolute 0.02 0.00 - 0.20 10*3/mm3    Immature Grans, Absolute 0.04 0.00 - 0.05 10*3/mm3    nRBC 0.0 0.0 - 0.2 /100 WBC   ECG 12 Lead ED Triage Standing Order; Weak / Dizzy / AMS    Collection Time: 09/05/24  3:28 PM   Result Value Ref Range    QT Interval 344 ms    QTC Interval 392 ms   Urinalysis With Microscopic If Indicated (No Culture) - Urine, Clean Catch    Collection Time: 09/05/24  5:51 PM    Specimen: Urine, Clean Catch   Result Value Ref Range    Color, UA Yellow Yellow, Straw    Appearance, UA Clear Clear    pH, UA 5.5 5.0 - 8.0    Specific Gravity, UA 1.014 1.001 - 1.030    Glucose, UA Negative Negative    Ketones, UA Negative Negative    Bilirubin, UA Negative Negative    Blood, UA Negative Negative    Protein, UA Negative Negative    Leuk Esterase, UA Moderate (2+) (A) Negative    Nitrite, UA Negative Negative    Urobilinogen, UA 0.2 E.U./dL 0.2 - 1.0 E.U./dL   Urine Drug Screen - Urine, Clean Catch    Collection Time: 09/05/24  5:51 PM    Specimen: Urine, Clean Catch   Result Value Ref Range    THC, Screen, Urine Negative Negative    Phencyclidine (PCP), Urine Negative Negative    Cocaine Screen, Urine Negative Negative    Methamphetamine, Ur Negative Negative    Opiate Screen Positive (A) Negative    Amphetamine Screen, Urine Positive (A) Negative    Benzodiazepine Screen, Urine Negative Negative    Tricyclic Antidepressants Screen Negative Negative    Methadone Screen, Urine Negative Negative    Barbiturates Screen, Urine Positive (A) Negative    Oxycodone Screen, Urine Negative Negative    Buprenorphine, Screen, Urine Negative Negative   Fentanyl, Urine - Urine, Clean Catch    Collection Time: 09/05/24  5:51 PM    Specimen: Urine, Clean Catch   Result Value Ref Range    Fentanyl, Urine Negative Negative   Urinalysis, Microscopic Only - Urine, Clean Catch    Collection Time: 09/05/24  5:51 PM    Specimen: Urine, Clean  Catch   Result Value Ref Range    RBC, UA 0-2 None Seen, 0-2 /HPF    WBC, UA 21-50 (A) None Seen, 0-2 /HPF    Bacteria, UA None Seen None Seen, Trace /HPF    Squamous Epithelial Cells, UA 0-2 None Seen, 0-2 /HPF    Hyaline Casts, UA 0-6 0 - 6 /LPF    Methodology Automated Microscopy    CUONG Prep - Swab, Vagina    Collection Time: 09/05/24  8:44 PM    Specimen: Vagina; Swab   Result Value Ref Range    KOH Prep No yeast or hyphal elements seen No yeast or hyphal elements seen   Wet Prep, Genital - Swab, Vagina    Collection Time: 09/05/24  8:44 PM    Specimen: Vagina; Swab   Result Value Ref Range    YEAST No yeast seen No yeast seen    HYPHAL ELEMENTS No Hyphal elements seen No Hyphal elements seen    WBC'S No WBC's seen No WBC's seen    Clue Cells, Wet Prep No Clue cells seen No Clue cells seen    Trichomonas, Wet Prep No Trichomonas seen No Trichomonas seen       If labs were ordered, I independently reviewed the results and considered them in treating the patient.        RADIOLOGY  XR Sacrum & Coccyx    Result Date: 9/5/2024  XR SACRUM AND COCCYX Date of Exam: 9/5/2024 4:48 PM EDT Indication: fall, sacral pain Comparison: None available. Findings: No evidence of fracture. SI joints symmetric. Sacral neural foramina intact     Impression: No evidence of fracture Electronically Signed: Shoaib Vale  9/5/2024 5:16 PM EDT  Workstation ID: [id number]    XR Knee 1 or 2 View Left    Result Date: 9/5/2024  XR KNEE 1 OR 2 VW LEFT Date of Exam: 9/5/2024 4:48 PM EDT Indication: knee pain, fall Comparison: None available. Findings: No evidence of fracture. No bony or joint abnormality. No fluid in the suprapatellar bursa     Impression: Negative Electronically Signed: Shoaib Vale  9/5/2024 5:15 PM EDT  Workstation ID: [id number]    CT Head Without Contrast    Result Date: 9/5/2024  CT HEAD WO CONTRAST Date of Exam: 9/5/2024 4:36 PM EDT Indication: ams. Comparison: December 19, 2019 Technique: Axial CT images were obtained of  the head without contrast administration.  Automated exposure control and iterative construction methods were used. Findings: No acute intracranial hemorrhage or extra-axial collection is identified. The ventricles appear normal in caliber, with no evidence of mass effect or midline shift. The basal cisterns appear patent. The gray-white differentiation appears preserved. The calvarium appear intact. There is moderate frothy paranasal sinus mucosal disease. The mastoid air cells are well-aerated. Patchy areas of periventricular and subcortical white matter hypodensities are nonspecific, but likely the sequela of moderate chronic small vessel ischemic disease.     Impression: 1.No acute intracranial process identified. 2.Findings suggestive of moderate chronic small vessel ischemic disease. 3.Moderate frothy paranasal sinus mucosal disease. Correlate for acute sinusitis. Electronically Signed: Jose Li MD  9/5/2024 4:53 PM EDT  Workstation ID: [id number]    XR Chest 1 View    Result Date: 9/5/2024  XR CHEST 1 VW Date of Exam: 9/5/2024 3:32 PM EDT Indication: Weak/Dizzy/AMS triage protocol Comparison: 9/5/2019. Findings: Heart and pulmonary vessels appear within normal limits. There are somewhat low lung volumes with poor inspiration. There is mild linear linear atelectasis in the perihilar regions. There are no effusions. There are no significant lung infiltrates.    Impression: Mild linear atelectasis. No significant infiltrate. Electronically Signed: Lashae Kinsey MD  9/5/2024 3:46 PM EDT  Workstation ID: [id number]     I ordered and independently reviewed the above noted radiographic studies.      PROCEDURES    Procedures    ECG 12 Lead ED Triage Standing Order; Weak / Dizzy / AMS   Preliminary Result   Test Reason : ED Triage Standing Order~   Blood Pressure :   */*   mmHG   Vent. Rate :  78 BPM     Atrial Rate :  78 BPM      P-R Int : 134 ms          QRS Dur :  64 ms       QT Int : 344 ms       P-R-T Axes  :  10 -26   0 degrees      QTc Int : 392 ms      Normal sinus rhythm   Low voltage QRS   Inferior infarct (cited on or before 14-JUN-2016)   Anterolateral infarct , age undetermined   Abnormal ECG   When compared with ECG of 03-SEP-2019 13:32,   Anterior infarct is now present   Anterolateral infarct is now present      Referred By:            Confirmed By:           MEDICATIONS GIVEN IN ER    Medications   cefuroxime (CEFTIN) tablet 500 mg (500 mg Oral Given 9/5/24 2012)         MEDICAL DECISION MAKING, PROGRESS, and CONSULTS    All labs, if obtained, have been independently reviewed by me.  All radiology studies, if obtained, have been reviewed by me and the radiologist dictating the report.  All EKG's, if obtained, have been independently viewed and interpreted by me/my attending physician.      Discussion below represents my analysis of pertinent findings related to patient's condition, differential diagnosis, treatment plan and final disposition.  Patient is 68-year-old female who presents for evaluation of frequent falls, generalized weakness, blood in urine.  Family was concerned that patient is seeing spiders that allegedly not there.  On physical exam patient was nontoxic-appearing, alert and oriented.  Lab work was obtained significant for normal white count, stable hemoglobin hematocrit, mildly elevated troponin 20, and patient reports no chest pain, EKG shows sinus rhythm without dynamic changes.  Urinalysis was significant for pyuria with white blood cells 25-50; urine drug screen positive for opiates, amphetamines and barbiturates.  Patient reports taking Lortab and gabapentin for chronic pain, recently started on phentermine for weight loss per her PCP.  Denies inappropriate drug use.  Denies any visual hallucinations while in ER.  Last ophthalmology exam 6 or 7 years ago.  Patient was concerned with small amount of blood when she was wiping.  I performed pelvic exam, noted no lesions, no blood, small  amount of white discharge, BV swab sent out pending.   CT head showed no acute intracranial process, findings suggestive of moderate chronic small  vessel ischemic disease.  X-ray of left knee, lower spine and chest were obtained showing no acute abnormalities.  Based on above findings and nearly benign physical exam patient is safe to be discharged home for outpatient follow-up with PCP, ophthalmology, psychiatry, gynecology.  Discussed return precaution for any new or worsening of the symptoms.                     Differential diagnosis:    Medication reaction, UTI, ICH, space occupying lesion, psychosis, visual disturbances      Additional sources:    - Discussed/ obtained information from independent historians: Daughter    - External (non-ED) record review:      - Chronic or social conditions impacting care: Polypharmacy, arthritis,  prescribed opioid use    - Shared decision making: Patient      Orders placed during this visit:  Orders Placed This Encounter   Procedures    CUONG Prep - Swab, Vagina    Wet Prep, Genital - Swab, Vagina    XR Chest 1 View    CT Head Without Contrast    XR Knee 1 or 2 View Left    XR Sacrum & Coccyx    [place] Draw    Comprehensive Metabolic Panel    Single High Sensitivity Troponin T    Magnesium    Urinalysis With Microscopic If Indicated (No Culture) - Urine, Clean Catch    CBC Auto Differential    Urine Drug Screen - Urine, Clean Catch    Fentanyl, Urine - Urine, Clean Catch    Urinalysis, Microscopic Only - Urine, Clean Catch    Continuous Pulse Oximetry    Vital Signs    ECG 12 Lead ED Triage Standing Order; Weak / Dizzy / AMS    CBC & Differential    Green Top (Gel)    Lavender Top    Gold Top - SST    Gray Top    Light Blue Top         Additional orders considered but not ordered:      ED Course:    Consultants:      ED Course as of 09/05/24 2252   Thu Sep 05, 2024   0458 I spoke with patient's daughter.  She states that patient's landlord called her concerning that patient  was seeing spiders everywhere and acting inadequate [IR]   2245 HS Troponin T(!): 20 [IR]      ED Course User Index  [IR] Orin Camarillo APRN              Shared Decision Making:  After my consideration of clinical presentation and any laboratory/radiology studies obtained, I discussed the findings with the patient/patient representative who is in agreement with the treatment plan and the final disposition.   Risks and benefits of discharge and/or observation/admission were discussed.       AS OF 22:52 EDT VITALS:    BP - 112/86  HR - 88  TEMP - 98.3 °F (36.8 °C) (Oral)  O2 SATS - 95%                  DIAGNOSIS  Final diagnoses:   Frequent falls   Pyuria         DISPOSITION  DISCHARGE    Patient discharged in stable condition.    Reviewed implications of results, diagnosis, meds, responsibility to follow up, warning signs and symptoms of possible worsening, potential complications and reasons to return to ER.    Patient/Family voiced understanding of above instructions.    Discussed plan for discharge, as there is no emergent indication for admission.  Pt/family is agreeable and understands need for follow up and possible repeat testing.  Pt/family is aware that discharge does not mean that nothing is wrong but that it indicates no emergency is currently present that requires admission and they must continue care with follow-up as given below or with a physician of their choice.     FOLLOW-UP  Akshat Dawson MD  [address] [place] [id number] [phone]          [facility] EMERGENCY DEPARTMENT  [address] [facility] [address] [phone]             Medication List        New Prescriptions      cefuroxime 500 MG tablet  Commonly known as: CEFTIN  Take 1 tablet by mouth 2 (Two) Times a Day for 10 days.               Where to Get Your Medications        These medications were sent to [facility] - [address] - [phone]  -  [phone] FX  [address] [address]      Phone: [phone]   cefuroxime 500 MG tablet          Please note that portions of this document were completed with voice recognition software.     ARI Brown   09/05/24   22:52 EDT        Orin Camarillo, APRN 09/05/24 5315

## 2024-09-06 LAB
QT INTERVAL: 344 MS
QTC INTERVAL: 392 MS

## 2024-09-08 ENCOUNTER — APPOINTMENT (OUTPATIENT)
Dept: GENERAL RADIOLOGY | Facility: HOSPITAL | Age: 69
End: 2024-09-08
Payer: MEDICARE

## 2024-09-08 ENCOUNTER — APPOINTMENT (OUTPATIENT)
Dept: CT IMAGING | Facility: HOSPITAL | Age: 69
End: 2024-09-08
Payer: MEDICARE

## 2024-09-08 ENCOUNTER — HOSPITAL ENCOUNTER (EMERGENCY)
Facility: HOSPITAL | Age: 69
Discharge: HOME | End: 2024-09-08
Attending: EMERGENCY MEDICINE
Payer: MEDICARE

## 2024-09-08 VITALS
BODY MASS INDEX: 30 KG/M2 | OXYGEN SATURATION: 98 % | TEMPERATURE: 97.2 F | DIASTOLIC BLOOD PRESSURE: 69 MMHG | HEIGHT: 62 IN | SYSTOLIC BLOOD PRESSURE: 132 MMHG | HEART RATE: 68 BPM | RESPIRATION RATE: 16 BRPM | WEIGHT: 163 LBS

## 2024-09-08 DIAGNOSIS — R26.89 BALANCE DISORDER: ICD-10-CM

## 2024-09-08 DIAGNOSIS — R29.6 FREQUENT FALLS: Primary | ICD-10-CM

## 2024-09-08 DIAGNOSIS — S80.212A ABRASION OF LEFT KNEE, INITIAL ENCOUNTER: ICD-10-CM

## 2024-09-08 LAB
BACTERIA UR QL AUTO: NORMAL /HPF
BILIRUB UR QL STRIP: NEGATIVE
CLARITY UR: CLEAR
COLOR UR: YELLOW
GLUCOSE UR STRIP-MCNC: NEGATIVE MG/DL
HGB UR QL STRIP.AUTO: NEGATIVE
HYALINE CASTS UR QL AUTO: NORMAL /LPF
KETONES UR QL STRIP: ABNORMAL
LEUKOCYTE ESTERASE UR QL STRIP.AUTO: ABNORMAL
NITRITE UR QL STRIP: NEGATIVE
PH UR STRIP.AUTO: 5.5 [PH] (ref 5–8)
PROT UR QL STRIP: NEGATIVE
RBC # UR STRIP: NORMAL /HPF
REF LAB TEST METHOD: NORMAL
SP GR UR STRIP: 1.02 (ref 1–1.03)
SQUAMOUS #/AREA URNS HPF: NORMAL /HPF
UROBILINOGEN UR QL STRIP: ABNORMAL
WBC # UR STRIP: NORMAL /HPF

## 2024-09-08 PROCEDURE — 81001 URINALYSIS AUTO W/SCOPE: CPT | Performed by: EMERGENCY MEDICINE

## 2024-09-08 PROCEDURE — 73560 X-RAY EXAM OF KNEE 1 OR 2: CPT

## 2024-09-08 PROCEDURE — 70450 CT HEAD/BRAIN W/O DYE: CPT

## 2024-09-08 PROCEDURE — 99284 EMERGENCY DEPT VISIT MOD MDM: CPT

## 2024-09-08 PROCEDURE — 73030 X-RAY EXAM OF SHOULDER: CPT

## 2024-09-08 NOTE — DISCHARGE INSTRUCTIONS
Please use a walker for ambulatory assistance in the meantime to prevent any recurrent falls or any further injury.  Follow-up with your PCP and neurology team as discussed, return to the ED with any worsening symptoms or further concerns in the meantime.

## 2024-09-08 NOTE — ED PROVIDER NOTES
[place]    EMERGENCY DEPARTMENT ENCOUNTER      Pt Name: Quyen Galvan  MRN: [id number]  YOB: 1955  Date of evaluation: 9/8/2024  Provider: Tino Hathaway DO    CHIEF COMPLAINT       Chief Complaint   Patient presents with    Fall       HPI  Stated Reason for Visit: BIBA fall 30 minutes PTA, denies LOC and thinners, endorses bilateral knee and L shoulder. EMS states bump on top of head. 975 tylenol 800 ibuprofen PTA. History Obtained From: patient;EMS       HISTORY OF PRESENT ILLNESS  (Location/Symptom, Timing/Onset, Context/Setting, Quality, Duration, Modifying Factors, Severity.)   Quyen Galvan is a 68 y.o. female who presents to the emergency department for evaluation by EMS secondary to recurrent falls she is here with her daughter who expressed concerns that the patient keeps falling which she notes is occurring because her knees give out from underneath her.  She is been assessed a couple days ago in the ED with similar presentation falls, some intermittent visual hallucinations she believes she is seeing spiders throughout her house and trying to stop on them when they are not actually there per the daughter.  She had CT scan of the head, urinalysis blood work completed as well as images which were unremarkable at that time.  She is previously followed with neurologist Dr. Murdock, but denies any significant or known neurological issues, no seizure activity, has been evaluated for possible underlying tremor.  No known history of dementia or cognitive workup.  She has had falls previously a few years ago which required physical therapy and rehab.  She does not use anything for ambulatory assistance including a cane or walker which her daughter believes she may need.  Patient nausea fever or chills, no nausea vomiting or diarrhea, she does currently discomfort along her bilateral knees, left shoulder after she fell, believes she hit the left side of her head without any loss of  consciousness.  She is not on any blood thinning medications.  She denies any other acute systemic complaints at this time.      Nursing notes were reviewed.      PAST MEDICAL HISTORY     Past Medical History:   Diagnosis Date    Acid reflux     Anemia     Anxiety     Arthritis     Bursitis of both hips     Cellulitis     Chicken pox     COPD (chronic obstructive pulmonary disease)     COPD (chronic obstructive pulmonary disease)     Depression     Fatty liver     Fibromyalgia     Hyperlipidemia     Hypertension     IBS (irritable bowel syndrome)     Inflammatory arthritis     Menopause     Mumps     Neuropathy     SASHA (obstructive sleep apnea)     Osteoarthritis     Respiratory failure     5/12/2022- [place]    Rheumatoid arthritis     RLS (restless legs syndrome)          SURGICAL HISTORY       Past Surgical History:   Procedure Laterality Date    CARDIAC CATHETERIZATION      CARDIAC CATHETERIZATION N/A 6/10/2020    Procedure: Left Heart Cath;  Surgeon: Juan Miguel Cole MD;  Location: [facility] CATH INVASIVE LOC[facility];  Service: Cardiovascular;  Laterality: N/A;    CHOLECYSTECTOMY      FRACTURE SURGERY      left wrist    OVARIAN CYST REMOVAL      SPINE SURGERY           CURRENT MEDICATIONS     No current facility-administered medications for this encounter.    Current Outpatient Medications:     albuterol (PROVENTIL HFA;VENTOLIN HFA) 108 (90 BASE) MCG/ACT inhaler, Inhale 2 puffs Every 4 (Four) Hours As Needed for Wheezing., Disp: , Rfl:     aspirin (Aspirin Low Dose) 81 MG EC tablet, Take 1 tablet by mouth Daily., Disp: 90 tablet, Rfl: 3    atorvastatin (LIPITOR) 20 MG tablet, Take 1 tablet by mouth Daily., Disp: , Rfl:     B Complex Vitamins (B COMPLEX-B12 PO), Take  by mouth., Disp: , Rfl:     bumetanide (BUMEX) 1 MG tablet, Take 1 tablet by mouth Daily., Disp: , Rfl:     calcium carbonate (OS-NORTH) 600 MG tablet, Take 1 tablet by mouth Daily., Disp: , Rfl:     cefuroxime (CEFTIN) 500 MG tablet, Take 1 tablet by  mouth 2 (Two) Times a Day for 10 days., Disp: 20 tablet, Rfl: 0    Cholecalciferol 25 MCG (1000 UT) tablet, Take 1 tablet by mouth Daily., Disp: , Rfl:     docusate sodium (COLACE) 100 MG capsule, TAKE 1 CAPSULE BY MOUTH TWICE A DAY FOR 30 DAYS, Disp: , Rfl:     docusate sodium (COLACE) 250 MG capsule, Take 1 capsule by mouth 2 (Two) Times a Day., Disp: , Rfl:     famotidine (PEPCID) 20 MG tablet, Take 1 tablet by mouth 2 (Two) Times a Day., Disp: , Rfl:     ferrous sulfate 140 (45 Fe) MG tablet controlled-release tablet, Take  by mouth Daily With Breakfast., Disp: , Rfl:     fluticasone (FLONASE) 50 MCG/ACT nasal spray, 2 sprays by Each Nare route Daily., Disp: , Rfl:     Fluticasone-Umeclidin-Vilant (TRELEGY) 100-62.5-25 MCG/INH inhaler, Inhale 1 puff Daily., Disp: , Rfl:     gabapentin (NEURONTIN) 800 MG tablet, TAKE 1 TABLET BY MOUTH 3 (THREE) TIMES A DAY., Disp: 90 tablet, Rfl: 5    HYDROcodone-acetaminophen (NORCO)  MG per tablet, Take 1 tablet by mouth Every 6 (Six) Hours., Disp: , Rfl:     ibuprofen (ADVIL,MOTRIN) 400 MG tablet, Take 1 tablet by mouth 3 (Three) Times a Day., Disp: , Rfl:     Lactobacillus (Acidophilus Probiotic) 100 MG capsule, Take 2 capsules by mouth Daily., Disp: , Rfl:     lidocaine (LIDODERM) 5 %, Place 1 patch on the skin as directed by provider Daily. Remove & Discard patch within 12 hours or as directed by MD, Disp: , Rfl:     nystatin (MYCOSTATIN) 807126 UNIT/GM cream, , Disp: , Rfl:     omeprazole (priLOSEC) 40 MG capsule, Take 1 capsule by mouth 2 (Two) Times a Day., Disp: , Rfl:     ondansetron (ZOFRAN) 4 MG tablet, Take 1 tablet by mouth Every 8 (Eight) Hours As Needed for Nausea or Vomiting., Disp: , Rfl:     phentermine 37.5 MG capsule, Take 1 capsule by mouth Daily., Disp: , Rfl:     potassium chloride (K-DUR,KLOR-CON) 20 MEQ CR tablet, Take 1 tablet by mouth 3 (Three) Times a Day., Disp: , Rfl:     primidone (MYSOLINE) 50 MG tablet, Take 1 tablet by mouth 2 (Two) Times  "a Day., Disp: 60 tablet, Rfl: 5    Probiotic capsule, Take 1 capsule by mouth Daily., Disp: , Rfl:     propranolol (INDERAL) 40 MG tablet, Take 1 tablet by mouth 2 (Two) Times a Day., Disp: 60 tablet, Rfl: 5    ALLERGIES     Patient has no known allergies.    FAMILY HISTORY       Family History   Problem Relation Age of Onset    Hypertension Father     COPD Father     Colon cancer Mother     Coronary artery disease Brother     COPD Brother     Stroke Brother           SOCIAL HISTORY       Social History     Socioeconomic History    Marital status:     Number of children: 1   Tobacco Use    Smoking status: Every Day     Current packs/day: 0.75     Average packs/day: 0.8 packs/day for 25.0 years (18.8 ttl pk-yrs)     Types: Cigarettes     Passive exposure: Current    Smokeless tobacco: Never   Vaping Use    Vaping status: Never Used   Substance and Sexual Activity    Alcohol use: No    Drug use: No    Sexual activity: Defer         PHYSICAL EXAM    (up to 7 for level 4, 8 or more for level 5)     Vitals:    09/08/24 0915   BP: 132/69   BP Location: Left arm   Patient Position: Sitting   Pulse: 68   Resp: 16   Temp: 97.2 °F (36.2 °C)   TempSrc: Oral   SpO2: 98%   Weight: 73.9 kg (163 lb)   Height: 157.5 cm (62\")       Physical Exam  General : Patient is awake, alert, oriented, in no acute distress, nontoxic appearing, GCS 15  HEENT: Pupils are equally round, EOMI, conjunctivae clear, there is no injection no icterus.  Oral mucosa is moist, uvula midline  Neck: Neck is supple, full range of motion, trachea midline  Cardiac: Heart regular rate, rhythm, no murmurs, rubs, or gallops  Lungs: Lungs are clear to auscultation, there is no wheezing, rhonchi, or rales. There is no use of accessory muscles  Chest wall: There is no tenderness to palpation over the chest wall or over ribs  Abdomen: Abdomen is soft, nontender, nondistended. There are no firm or pulsatile masses, no rebound rigidity or " guarding  Musculoskeletal: There is a mild tenderness overlying the lateral aspect of the left shoulder, no signs of dislocation, range of motion of the lower extremities is intact small abrasion to left anterior knee 5 out of 5 strength in all 4 extremities.  No focal muscle deficits are appreciated  Neuro: Motor intact, sensory intact, level of consciousness is normal, no focal neurological decile examination  Dermatology: Skin is warm and dry      DIAGNOSTIC RESULTS     EKG:  All EKGs are interpreted by the Emergency Department Physician who either signs or Co-signs this chart in the absence of a cardiologist.    No orders to display       RADIOLOGY:   Encounter Date    9/5/24    CT Head Without Contrast [[id number]] (Order [id number])  Order  Status: Final result     Patient Location    Patient Class Location   Emergency [facility] EMERGENCY DEPT, RW3, R3     [phone]     Study Notes     Tremaine Tomas on 9/5/2024  4:42 PM EDT   AMS     Appointment Information    PACS Images     Radiology Images  Study Result    Narrative & Impression   CT HEAD WO CONTRAST     Date of Exam: 9/5/2024 4:36 PM EDT     Indication: ams.     Comparison: December 19, 2019     Technique: Axial CT images were obtained of the head without contrast administration.  Automated exposure control and iterative construction methods were used.        Findings:  No acute intracranial hemorrhage or extra-axial collection is identified. The ventricles appear normal in caliber, with no evidence of mass effect or midline shift. The basal cisterns appear patent. The gray-white differentiation appears preserved.     The calvarium appear intact. There is moderate frothy paranasal sinus mucosal disease. The mastoid air cells are well-aerated. Patchy areas of periventricular and subcortical white matter hypodensities are nonspecific, but likely the sequela of moderate   chronic small vessel ischemic disease.     IMPRESSION:  Impression:  1.No acute  intracranial process identified.  2.Findings suggestive of moderate chronic small vessel ischemic disease.  3.Moderate frothy paranasal sinus mucosal disease. Correlate for acute sinusitis.           Electronically Signed: Jose Li MD    9/5/2024 4:53 PM EDT    Workstation ID: [id number]     [x] Radiologist's Report Reviewed:  CT Head Without Contrast   Final Result   Impression:   No acute intracranial process identified.            Electronically Signed: Reynaldo Ayala MD     9/8/2024 12:09 PM EDT     Workstation ID: [id number]      XR Knee 1 or 2 View Right   Final Result   Impression:   1. Right total knee prosthesis without evidence of periprosthetic fracture or loosening.         Electronically Signed: Jt Flowers     9/8/2024 10:57 AM EDT     Workstation ID: [id number]      XR Knee 1 or 2 View Left   Final Result   Impression:   1. No acute osseous abnormality or joint effusion of the left knee.            Electronically Signed: Jt Flowers     9/8/2024 10:56 AM EDT     Workstation ID: [id number]      XR Shoulder 2+ View Left   Final Result   Impression:   1. No acute osseous abnormality of the left shoulder.   2. Degenerative joint disease at the acromioclavicular joint.         Electronically Signed: Jt Kennedyor     9/8/2024 11:01 AM EDT     Workstation ID: [id number]          I ordered and independently reviewed the above noted radiographic studies.      I viewed images of CT head which showed no acute intracranial abnormality per my independent interpretation.    See radiologist's dictation for official interpretation.      ED BEDSIDE ULTRASOUND:   Performed by ED Physician - none    LABS:    I have reviewed and interpreted all of the currently available lab results from this visit (if applicable):  Results for orders placed or performed during the hospital encounter of 09/05/24   CUONG Prep - Swab, Vagina    Specimen: Vagina; Swab   Result Value Ref Range    KOH Prep No yeast or hyphal elements  seen No yeast or hyphal elements seen   Wet Prep, Genital - Swab, Vagina    Specimen: Vagina; Swab   Result Value Ref Range    YEAST No yeast seen No yeast seen    HYPHAL ELEMENTS No Hyphal elements seen No Hyphal elements seen    WBC'S No WBC's seen No WBC's seen    Clue Cells, Wet Prep No Clue cells seen No Clue cells seen    Trichomonas, Wet Prep No Trichomonas seen No Trichomonas seen   Comprehensive Metabolic Panel    Specimen: Blood   Result Value Ref Range    Glucose 102 (H) 65 - 99 mg/dL    BUN 15 8 - 23 mg/dL    Creatinine 0.86 0.57 - 1.00 mg/dL    Sodium 140 136 - 145 mmol/L    Potassium 4.1 3.5 - 5.2 mmol/L    Chloride 100 98 - 107 mmol/L    CO2 30.0 (H) 22.0 - 29.0 mmol/L    Calcium 9.5 8.6 - 10.5 mg/dL    Total Protein 6.9 6.0 - 8.5 g/dL    Albumin 4.2 3.5 - 5.2 g/dL    ALT (SGPT) 17 1 - 33 U/L    AST (SGOT) 18 1 - 32 U/L    Alkaline Phosphatase 98 39 - 117 U/L    Total Bilirubin 0.4 0.0 - 1.2 mg/dL    Globulin 2.7 gm/dL    A/G Ratio 1.6 g/dL    BUN/Creatinine Ratio 17.4 7.0 - 25.0    Anion Gap 10.0 5.0 - 15.0 mmol/L    eGFR 73.2 >60.0 mL/min/1.73   Single High Sensitivity Troponin T    Specimen: Blood   Result Value Ref Range    HS Troponin T 20 (H) <14 ng/L   Magnesium    Specimen: Blood   Result Value Ref Range    Magnesium 1.7 1.6 - 2.4 mg/dL   Urinalysis With Microscopic If Indicated (No Culture) - Urine, Clean Catch    Specimen: Urine, Clean Catch   Result Value Ref Range    Color, UA Yellow Yellow, Straw    Appearance, UA Clear Clear    pH, UA 5.5 5.0 - 8.0    Specific Gravity, UA 1.014 1.001 - 1.030    Glucose, UA Negative Negative    Ketones, UA Negative Negative    Bilirubin, UA Negative Negative    Blood, UA Negative Negative    Protein, UA Negative Negative    Leuk Esterase, UA Moderate (2+) (A) Negative    Nitrite, UA Negative Negative    Urobilinogen, UA 0.2 E.U./dL 0.2 - 1.0 E.U./dL   CBC Auto Differential    Specimen: Blood   Result Value Ref Range    WBC 7.92 3.40 - 10.80 10*3/mm3     RBC 3.35 (L) 3.77 - 5.28 10*6/mm3    Hemoglobin 12.1 12.0 - 15.9 g/dL    Hematocrit 36.7 34.0 - 46.6 %    .6 (H) 79.0 - 97.0 fL    MCH 36.1 (H) 26.6 - 33.0 pg    MCHC 33.0 31.5 - 35.7 g/dL    RDW 14.6 12.3 - 15.4 %    RDW-SD 57.8 (H) 37.0 - 54.0 fl    MPV 12.5 (H) 6.0 - 12.0 fL    Platelets 130 (L) 140 - 450 10*3/mm3    Neutrophil % 78.4 (H) 42.7 - 76.0 %    Lymphocyte % 15.2 (L) 19.6 - 45.3 %    Monocyte % 4.3 (L) 5.0 - 12.0 %    Eosinophil % 1.3 0.3 - 6.2 %    Basophil % 0.3 0.0 - 1.5 %    Immature Grans % 0.5 0.0 - 0.5 %    Neutrophils, Absolute 6.22 1.70 - 7.00 10*3/mm3    Lymphocytes, Absolute 1.20 0.70 - 3.10 10*3/mm3    Monocytes, Absolute 0.34 0.10 - 0.90 10*3/mm3    Eosinophils, Absolute 0.10 0.00 - 0.40 10*3/mm3    Basophils, Absolute 0.02 0.00 - 0.20 10*3/mm3    Immature Grans, Absolute 0.04 0.00 - 0.05 10*3/mm3    nRBC 0.0 0.0 - 0.2 /100 WBC   Urine Drug Screen - Urine, Clean Catch    Specimen: Urine, Clean Catch   Result Value Ref Range    THC, Screen, Urine Negative Negative    Phencyclidine (PCP), Urine Negative Negative    Cocaine Screen, Urine Negative Negative    Methamphetamine, Ur Negative Negative    Opiate Screen Positive (A) Negative    Amphetamine Screen, Urine Positive (A) Negative    Benzodiazepine Screen, Urine Negative Negative    Tricyclic Antidepressants Screen Negative Negative    Methadone Screen, Urine Negative Negative    Barbiturates Screen, Urine Positive (A) Negative    Oxycodone Screen, Urine Negative Negative    Buprenorphine, Screen, Urine Negative Negative   Fentanyl, Urine - Urine, Clean Catch    Specimen: Urine, Clean Catch   Result Value Ref Range    Fentanyl, Urine Negative Negative   Urinalysis, Microscopic Only - Urine, Clean Catch    Specimen: Urine, Clean Catch   Result Value Ref Range    RBC, UA 0-2 None Seen, 0-2 /HPF    WBC, UA 21-50 (A) None Seen, 0-2 /HPF    Bacteria, UA None Seen None Seen, Trace /HPF    Squamous Epithelial Cells, UA 0-2 None Seen, 0-2  /HPF    Hyaline Casts, UA 0-6 0 - 6 /LPF    Methodology Automated Microscopy        I have reviewed and interpreted all of the currently available lab results from this visit (if applicable):  Results for orders placed or performed during the hospital encounter of 09/05/24   CUONG Prep - Swab, Vagina    Specimen: Vagina; Swab   Result Value Ref Range    KOH Prep No yeast or hyphal elements seen No yeast or hyphal elements seen   Wet Prep, Genital - Swab, Vagina    Specimen: Vagina; Swab   Result Value Ref Range    YEAST No yeast seen No yeast seen    HYPHAL ELEMENTS No Hyphal elements seen No Hyphal elements seen    WBC'S No WBC's seen No WBC's seen    Clue Cells, Wet Prep No Clue cells seen No Clue cells seen    Trichomonas, Wet Prep No Trichomonas seen No Trichomonas seen   Comprehensive Metabolic Panel    Specimen: Blood   Result Value Ref Range    Glucose 102 (H) 65 - 99 mg/dL    BUN 15 8 - 23 mg/dL    Creatinine 0.86 0.57 - 1.00 mg/dL    Sodium 140 136 - 145 mmol/L    Potassium 4.1 3.5 - 5.2 mmol/L    Chloride 100 98 - 107 mmol/L    CO2 30.0 (H) 22.0 - 29.0 mmol/L    Calcium 9.5 8.6 - 10.5 mg/dL    Total Protein 6.9 6.0 - 8.5 g/dL    Albumin 4.2 3.5 - 5.2 g/dL    ALT (SGPT) 17 1 - 33 U/L    AST (SGOT) 18 1 - 32 U/L    Alkaline Phosphatase 98 39 - 117 U/L    Total Bilirubin 0.4 0.0 - 1.2 mg/dL    Globulin 2.7 gm/dL    A/G Ratio 1.6 g/dL    BUN/Creatinine Ratio 17.4 7.0 - 25.0    Anion Gap 10.0 5.0 - 15.0 mmol/L    eGFR 73.2 >60.0 mL/min/1.73   Single High Sensitivity Troponin T    Specimen: Blood   Result Value Ref Range    HS Troponin T 20 (H) <14 ng/L   Magnesium    Specimen: Blood   Result Value Ref Range    Magnesium 1.7 1.6 - 2.4 mg/dL   Urinalysis With Microscopic If Indicated (No Culture) - Urine, Clean Catch    Specimen: Urine, Clean Catch   Result Value Ref Range    Color, UA Yellow Yellow, Straw    Appearance, UA Clear Clear    pH, UA 5.5 5.0 - 8.0    Specific Gravity, UA 1.014 1.001 - 1.030    Glucose,  UA Negative Negative    Ketones, UA Negative Negative    Bilirubin, UA Negative Negative    Blood, UA Negative Negative    Protein, UA Negative Negative    Leuk Esterase, UA Moderate (2+) (A) Negative    Nitrite, UA Negative Negative    Urobilinogen, UA 0.2 E.U./dL 0.2 - 1.0 E.U./dL   CBC Auto Differential    Specimen: Blood   Result Value Ref Range    WBC 7.92 3.40 - 10.80 10*3/mm3    RBC 3.35 (L) 3.77 - 5.28 10*6/mm3    Hemoglobin 12.1 12.0 - 15.9 g/dL    Hematocrit 36.7 34.0 - 46.6 %    .6 (H) 79.0 - 97.0 fL    MCH 36.1 (H) 26.6 - 33.0 pg    MCHC 33.0 31.5 - 35.7 g/dL    RDW 14.6 12.3 - 15.4 %    RDW-SD 57.8 (H) 37.0 - 54.0 fl    MPV 12.5 (H) 6.0 - 12.0 fL    Platelets 130 (L) 140 - 450 10*3/mm3    Neutrophil % 78.4 (H) 42.7 - 76.0 %    Lymphocyte % 15.2 (L) 19.6 - 45.3 %    Monocyte % 4.3 (L) 5.0 - 12.0 %    Eosinophil % 1.3 0.3 - 6.2 %    Basophil % 0.3 0.0 - 1.5 %    Immature Grans % 0.5 0.0 - 0.5 %    Neutrophils, Absolute 6.22 1.70 - 7.00 10*3/mm3    Lymphocytes, Absolute 1.20 0.70 - 3.10 10*3/mm3    Monocytes, Absolute 0.34 0.10 - 0.90 10*3/mm3    Eosinophils, Absolute 0.10 0.00 - 0.40 10*3/mm3    Basophils, Absolute 0.02 0.00 - 0.20 10*3/mm3    Immature Grans, Absolute 0.04 0.00 - 0.05 10*3/mm3    nRBC 0.0 0.0 - 0.2 /100 WBC   Urine Drug Screen - Urine, Clean Catch    Specimen: Urine, Clean Catch   Result Value Ref Range    THC, Screen, Urine Negative Negative    Phencyclidine (PCP), Urine Negative Negative    Cocaine Screen, Urine Negative Negative    Methamphetamine, Ur Negative Negative    Opiate Screen Positive (A) Negative    Amphetamine Screen, Urine Positive (A) Negative    Benzodiazepine Screen, Urine Negative Negative    Tricyclic Antidepressants Screen Negative Negative    Methadone Screen, Urine Negative Negative    Barbiturates Screen, Urine Positive (A) Negative    Oxycodone Screen, Urine Negative Negative    Buprenorphine, Screen, Urine Negative Negative   Fentanyl, Urine - Urine,  Clean Catch    Specimen: Urine, Clean Catch   Result Value Ref Range    Fentanyl, Urine Negative Negative   Urinalysis, Microscopic Only - Urine, Clean Catch    Specimen: Urine, Clean Catch   Result Value Ref Range    RBC, UA 0-2 None Seen, 0-2 /HPF    WBC, UA 21-50 (A) None Seen, 0-2 /HPF    Bacteria, UA None Seen None Seen, Trace /HPF    Squamous Epithelial Cells, UA 0-2 None Seen, 0-2 /HPF    Hyaline Casts, UA 0-6 0 - 6 /LPF    Methodology Automated Microscopy    ECG 12 Lead ED Triage Standing Order; Weak / Dizzy / AMS   Result Value Ref Range    QT Interval 344 ms    QTC Interval 392 ms   Green Top (Gel)   Result Value Ref Range    Extra Tube Hold for add-ons.    Lavender Top   Result Value Ref Range    Extra Tube hold for add-on    Gold Top - SST   Result Value Ref Range    Extra Tube Hold for add-ons.    Gray Top   Result Value Ref Range    Extra Tube Hold for add-ons.    Light Blue Top   Result Value Ref Range    Extra Tube Hold for add-ons.         If labs were ordered, I independently reviewed the results and considered them in treating the patient.      EMERGENCY DEPARTMENT COURSE and DIFFERENTIAL DIAGNOSIS/MDM:   Vitals:  AS OF 13:29 EDT    BP - 132/69  HR - 68  TEMP - 97.2 °F (36.2 °C) (Oral)  O2 SATS - 98%      Orders placed during this visit:  Orders Placed This Encounter   Procedures    XR Knee 1 or 2 View Left    XR Knee 1 or 2 View Right    XR Shoulder 2+ View Left    CT Head Without Contrast    Urinalysis With Microscopic If Indicated (No Culture) - Urine, Clean Catch       All labs have been independently reviewed by me.  All radiology studies have been reviewed by me and the radiologist dictating the report.  All EKG's have been independently viewed and interpreted by me.      Discussion below represents my analysis of pertinent findings related to patient's condition, differential diagnosis, treatment plan and final disposition.    Differential diagnosis:  The differential diagnosis associated  with the patient's presentation includes: Recurrent falls, generalized weakness, uncoordination, soft tissue injury, contusion, fall risk, UTI    Additional sources  Discussed/ obtained information from independent historians:   [] Spouse  [] Parent  [x] Family member  [] Friend  [] EMS   [] Other:    External (non-ED) record review:   [] Inpatient record:   [] Office record:   [] Outpatient record:   [] Prior Outpatient labs:   [] Prior Outpatient radiology:   [] Primary Care record:   [] Outside ED record:   [] Other:     Patient's care impacted by:   [] Diabetes  [] Hypertension  [] CHF  [] Hyperlipidemia  [] Coronary Artery Disease   [] COPD   [] Cancer   [] Tobacco Abuse   [] Substance Abuse    [] Other:     Care significantly affected by Social Determinants of Health (housing and economic circumstances, unemployment)    [] Yes     [x] No   If yes, Patient's care significantly limited by Social Determinants of Health including:   [] Inadequate housing   [] Low income   [] Alcoholism and drug addiction in family   [] Problems related to primary support group   [] Unemployment   [] Problems related to employment   [] Other Social Determinants of Health:       MEDICATIONS ADMINISTERED IN ED:  Medications - No data to display           This is a 68-year-old female has had multiple recurrent falls, previous ED evaluation with no acute or maladies or significant metabolic dysfunction at that time.  Has been on antibiotic therapy and has been compliant with this.  She has had issues with recurrent falls and uncoordination and balance issues.  The family is concerned about her falls and fall risk and the patient refuses to use a walker for ambulatory assistance.  She does follow with the neurology team, she states she would prefer to follow-up with [facility] neurology, potentially for further cognitive workup.  She is some soft tissue injury and contusions today, CT head is unremarkable as well as x-ray  images.  We discussed having the patient continue with her current treatment plan, finishing up her antibiotics, referral over to her neurology or [facility] for further cognitive workup, physical therapy and rehabs to her PCP, returning to the ED with any worsening symptoms or further concerns in the meantime.    I had a discussion with the patient/family regarding diagnosis, diagnostic results, treatment plan, and medications.  The patient/family indicated understanding of these instructions.  I spent adequate time at the bedside preceding discharge necessary to personally discuss the aftercare instructions, giving patient education, providing explanations of the results of our evaluations/findings, and my decision making to assure that the patient/family understand the plan of care.  Time was allotted to answer questions at that time and throughout the ED course.  Emphasis was placed on timely follow-up after discharge.  I also discussed the potential for the development of an acute emergent condition requiring further evaluation, admission, or even surgical intervention. I discussed that we found nothing during the visit today indicating the need for further workup, admission, or the presence of an unstable medical condition.  I encouraged the patient to return to the emergency department immediately for ANY concerns, worsening, new complaints, or if symptoms persist and unable to seek follow-up in a timely fashion.  The patient/family expressed understanding and agreement with this plan.  The patient will follow-up with their PCP in 1-2 days for reevaluation.     PROCEDURES:  Procedures    CRITICAL CARE TIME    Total Critical Care time was 0 minutes, excluding separately reportable procedures.   There was a high probability of clinically significant/life threatening deterioration in the patient's condition which required my urgent intervention.      FINAL IMPRESSION      1. Frequent falls    2.  Balance disorder    3. Abrasion of left knee, initial encounter          DISPOSITION/PLAN     ED Disposition       ED Disposition   Discharge    Condition   Stable    Comment   --               PATIENT REFERRED TO:  Akshat Dawson  [address]  [facility] [address] [phone]    Schedule an appointment as soon as possible for a visit in 2 days  For reevaluation, referral over to physical therapy and rehab for balance and coordination work     Neurology  [facility]  [address] [place], [place] [id number] [phone]  Schedule an appointment as soon as possible for a visit       [facility] EMERGENCY DEPARTMENT  [address] [facility] [address] [phone]    If symptoms worsen      DISCHARGE MEDICATIONS:     Medication List        CONTINUE taking these medications      Acidophilus Probiotic 100 MG capsule     albuterol sulfate  (90 Base) MCG/ACT inhaler  Commonly known as: PROVENTIL HFA;VENTOLIN HFA;PROAIR HFA     aspirin 81 MG EC tablet  Commonly known as: Aspirin Low Dose  Take 1 tablet by mouth Daily.     atorvastatin 20 MG tablet  Commonly known as: LIPITOR     B COMPLEX-B12 PO     bumetanide 1 MG tablet  Commonly known as: BUMEX     calcium carbonate 600 MG tablet  Commonly known as: OS-NORTH     cefuroxime 500 MG tablet  Commonly known as: CEFTIN  Take 1 tablet by mouth 2 (Two) Times a Day for 10 days.     cholecalciferol 25 MCG (1000 UT) tablet  Commonly known as: VITAMIN D3     * docusate sodium 250 MG capsule  Commonly known as: COLACE     * docusate sodium 100 MG capsule  Commonly known as: COLACE     famotidine 20 MG tablet  Commonly known as: PEPCID     ferrous sulfate 140 (45 Fe) MG tablet controlled-release tablet     fluticasone 50 MCG/ACT nasal spray  Commonly known as: FLONASE     Fluticasone-Umeclidin-Vilant 100-62.5-25 MCG/INH inhaler  Commonly known as: TRELEGY     gabapentin 800 MG tablet  Commonly known as:  NEURONTIN  TAKE 1 TABLET BY MOUTH 3 (THREE) TIMES A DAY.     HYDROcodone-acetaminophen  MG per tablet  Commonly known as: NORCO     ibuprofen 400 MG tablet  Commonly known as: ADVIL,MOTRIN     lidocaine 5 %  Commonly known as: LIDODERM     nystatin 182068 UNIT/GM cream  Commonly known as: MYCOSTATIN     omeprazole 40 MG capsule  Commonly known as: priLOSEC     ondansetron 4 MG tablet  Commonly known as: ZOFRAN     phentermine 37.5 MG capsule     potassium chloride 20 MEQ CR tablet  Commonly known as: KLOR-CON M20     primidone 50 MG tablet  Commonly known as: MYSOLINE  Take 1 tablet by mouth 2 (Two) Times a Day.     Probiotic capsule     propranolol 40 MG tablet  Commonly known as: INDERAL  Take 1 tablet by mouth 2 (Two) Times a Day.           * This list has 2 medication(s) that are the same as other medications prescribed for you. Read the directions carefully, and ask your doctor or other care provider to review them with you.                      Comment: Please note this report has been produced using speech recognition software.      Tino Hathaway DO  Attending Emergency Physician         Tino Hathaway DO  09/08/24 7725

## 2024-09-09 LAB
A VAGINAE DNA VAG QL NAA+PROBE: NORMAL SCORE
BVAB2 DNA VAG QL NAA+PROBE: NORMAL SCORE
C ALBICANS DNA VAG QL NAA+PROBE: NEGATIVE
C GLABRATA DNA VAG QL NAA+PROBE: NEGATIVE
C KRUSEI DNA VAG QL NAA+PROBE: NEGATIVE
C LUSITANIAE DNA VAG QL NAA+PROBE: NEGATIVE
C TRACH DNA SPEC QL NAA+PROBE: NEGATIVE
CANDIDA DNA VAG QL NAA+PROBE: NEGATIVE
MEGA1 DNA VAG QL NAA+PROBE: NORMAL SCORE
N GONORRHOEA DNA VAG QL NAA+PROBE: NEGATIVE
T VAGINALIS DNA VAG QL NAA+PROBE: NEGATIVE

## 2024-09-30 RX ORDER — ASPIRIN 81 MG/1
81 TABLET, COATED ORAL DAILY
Qty: 90 TABLET | Refills: 3 | Status: SHIPPED | OUTPATIENT
Start: 2024-09-30

## 2025-01-22 NOTE — TELEPHONE ENCOUNTER
Rx Refill Note  Requested Prescriptions     Pending Prescriptions Disp Refills    propranolol (INDERAL) 40 MG tablet [Pharmacy Med Name: PROPRANOLOL HCL 40 MG TABS 40 Tablet] 60 tablet 5     Sig: TAKE 1 TABLET BY MOUTH TWO TIMES EVERY DAY    primidone (MYSOLINE) 50 MG tablet [Pharmacy Med Name: PRIMIDONE 50 MG TABS 50 Tablet] 60 tablet 5     Sig: TAKE 1 TABLET BY MOUTH TWICE DAILY      Last filled:6/25/24 5 refill  Last office visit with prescribing clinician: 6/25/2024      Next office visit with prescribing clinician: Visit date not found     FRANC CHRISTENSEN  01/22/25, 13:35 EST

## 2025-01-27 RX ORDER — PROPRANOLOL HYDROCHLORIDE 40 MG/1
TABLET ORAL
Qty: 60 TABLET | Refills: 5 | Status: SHIPPED | OUTPATIENT
Start: 2025-01-27

## 2025-01-27 RX ORDER — PRIMIDONE 50 MG/1
50 TABLET ORAL 2 TIMES DAILY
Qty: 60 TABLET | Refills: 5 | Status: SHIPPED | OUTPATIENT
Start: 2025-01-27

## 2025-02-24 DIAGNOSIS — G62.9 POLYNEUROPATHY: ICD-10-CM

## 2025-02-25 RX ORDER — GABAPENTIN 800 MG/1
800 TABLET ORAL 3 TIMES DAILY
Qty: 90 TABLET | Refills: 0 | Status: SHIPPED | OUTPATIENT
Start: 2025-02-25 | End: 2026-02-25

## 2025-02-25 NOTE — TELEPHONE ENCOUNTER
Rx Refill Note  Requested Prescriptions     Pending Prescriptions Disp Refills    gabapentin (NEURONTIN) 800 MG tablet [Pharmacy Med Name: GABAPENTIN 800 MG TABS 800 Tablet] 90 tablet 5     Sig: TAKE 1 TABLET BY MOUTH 3 (THREE) TIMES A DAY.      Last filled: 8/15/24 for 6 mos total    Last office visit with prescribing clinician: 6/25/2024      Next office visit with prescribing clinician: Visit date not found  --> added note to sig pt needs appt for further refills      Vinicius Santizo MA  02/25/25, 08:43 EST

## 2025-04-23 DIAGNOSIS — G62.9 POLYNEUROPATHY: ICD-10-CM

## 2025-04-23 RX ORDER — GABAPENTIN 800 MG/1
TABLET ORAL
Qty: 90 TABLET | Refills: 3 | Status: SHIPPED | OUTPATIENT
Start: 2025-04-23

## 2025-04-23 NOTE — TELEPHONE ENCOUNTER
Rx Refill Note  Requested Prescriptions     Pending Prescriptions Disp Refills    gabapentin (NEURONTIN) 800 MG tablet [Pharmacy Med Name: GABAPENTIN 800 MG TABS 800 Tablet] 90 tablet 0     Sig: TAKE 1 TABLET BY MOUTH THREE TIMES A DAY (MUST SCHEDULE APPOINTMENT FOR ADDITIONAL REFILLS)      Last filled:2/25/25  Last office visit with prescribing clinician: 6/25/2024      Next office visit with prescribing clinician: Visit date not found     Karen Collado MA  04/23/25, 16:20 EDT    Pending to provider-patient is also being seen at

## (undated) DEVICE — GW PERIPH GUIDERIGHT STD/EXCHNG/J/TIP SS 0.035IN 5X260CM

## (undated) DEVICE — MODEL BT2000 P/N 700287-012KIT CONTENTS: MANIFOLD WITH SALINE AND CONTRAST PORTS, SALINE TUBING WITH SPIKE AND HAND SYRINGE, TRANSDUCER: Brand: BT2000 AUTOMATED MANIFOLD KIT

## (undated) DEVICE — CATH DIAG EXPO .045 FL3.5 5F 100CM

## (undated) DEVICE — Device

## (undated) DEVICE — DEV COMP RAD PRELUDESYNC 24CM

## (undated) DEVICE — GW J TP FIX CORE .035 150

## (undated) DEVICE — PINNACLE INTRODUCER SHEATH: Brand: PINNACLE

## (undated) DEVICE — INTRAOPERATIVE COVER KIT, 10 PACK: Brand: SITE-RITE

## (undated) DEVICE — MODEL AT P65, P/N 701554-001KIT CONTENTS: HAND CONTROLLER, 3-WAY HIGH-PRESSURE STOPCOCK WITH ROTATING END AND PREMIUM HIGH-PRESSURE TUBING: Brand: ANGIOTOUCH® KIT

## (undated) DEVICE — PK CATH CARD 10

## (undated) DEVICE — ANGIO-SEAL EVOLUTION VASCULAR CLOSURE DEVICE: Brand: ANGIO-SEAL

## (undated) DEVICE — GLIDESHEATH SLENDER STAINLESS STEEL KIT: Brand: GLIDESHEATH SLENDER

## (undated) DEVICE — CATH DIAG EXPO .045 FL3  5F 100CM

## (undated) DEVICE — ST ACC MICROPUNCTURE .018 TRANSLSS/SS/TP 5F/10CM 21G/7CM

## (undated) DEVICE — CATH DIAG EXPO M/ PK 5F FL4/FR4 PIG